# Patient Record
Sex: FEMALE | Race: WHITE | NOT HISPANIC OR LATINO | Employment: OTHER | URBAN - METROPOLITAN AREA
[De-identification: names, ages, dates, MRNs, and addresses within clinical notes are randomized per-mention and may not be internally consistent; named-entity substitution may affect disease eponyms.]

---

## 2018-05-14 ENCOUNTER — OFFICE VISIT (OUTPATIENT)
Dept: INTERNAL MEDICINE CLINIC | Facility: CLINIC | Age: 83
End: 2018-05-14
Payer: MEDICARE

## 2018-05-14 VITALS
RESPIRATION RATE: 16 BRPM | DIASTOLIC BLOOD PRESSURE: 70 MMHG | OXYGEN SATURATION: 98 % | HEIGHT: 60 IN | TEMPERATURE: 98.2 F | HEART RATE: 71 BPM | WEIGHT: 102.4 LBS | BODY MASS INDEX: 20.1 KG/M2 | SYSTOLIC BLOOD PRESSURE: 140 MMHG

## 2018-05-14 DIAGNOSIS — M81.0 AGE-RELATED OSTEOPOROSIS WITHOUT CURRENT PATHOLOGICAL FRACTURE: Primary | ICD-10-CM

## 2018-05-14 DIAGNOSIS — I10 ESSENTIAL HYPERTENSION: ICD-10-CM

## 2018-05-14 DIAGNOSIS — F41.1 ANXIETY STATE: ICD-10-CM

## 2018-05-14 PROBLEM — R41.3 MEMORY LOSS: Status: ACTIVE | Noted: 2017-09-27

## 2018-05-14 PROCEDURE — 99203 OFFICE O/P NEW LOW 30 MIN: CPT | Performed by: INTERNAL MEDICINE

## 2018-05-14 RX ORDER — ALPRAZOLAM 0.25 MG/1
0.5 TABLET ORAL EVERY 6 HOURS PRN
COMMUNITY
Start: 2018-03-30 | End: 2019-06-11 | Stop reason: HOSPADM

## 2018-05-14 RX ORDER — ALENDRONATE SODIUM 70 MG/1
70 TABLET ORAL WEEKLY
COMMUNITY
Start: 2018-03-19 | End: 2018-09-19 | Stop reason: ALTCHOICE

## 2018-05-14 RX ORDER — ZINC GLUCONATE 50 MG
1 TABLET ORAL
COMMUNITY
Start: 2011-11-28 | End: 2019-07-10 | Stop reason: HOSPADM

## 2018-05-14 RX ORDER — VITAMIN E 268 MG
1 CAPSULE ORAL
COMMUNITY
Start: 2011-11-28 | End: 2019-07-10 | Stop reason: HOSPADM

## 2018-05-14 RX ORDER — ESCITALOPRAM OXALATE 20 MG/1
30 TABLET ORAL
COMMUNITY
Start: 2018-03-19 | End: 2018-07-09 | Stop reason: SDUPTHER

## 2018-05-14 RX ORDER — BIOTIN 1 MG
1000 TABLET ORAL
COMMUNITY
Start: 2017-05-25 | End: 2019-07-10 | Stop reason: HOSPADM

## 2018-05-14 RX ORDER — ASPIRIN 81 MG/1
1 TABLET, CHEWABLE ORAL ONCE
COMMUNITY
Start: 2011-05-05

## 2018-05-14 RX ORDER — LISINOPRIL AND HYDROCHLOROTHIAZIDE 12.5; 1 MG/1; MG/1
1 TABLET ORAL
COMMUNITY
Start: 2018-03-19 | End: 2018-09-19 | Stop reason: ALTCHOICE

## 2018-05-14 RX ORDER — SPIRONOLACTONE 25 MG
40 TABLET ORAL
COMMUNITY
Start: 2013-11-21 | End: 2019-06-29 | Stop reason: ALTCHOICE

## 2018-05-14 RX ORDER — FEXOFENADINE HCL 180 MG/1
60 TABLET ORAL 2 TIMES DAILY
COMMUNITY
Start: 2013-04-10 | End: 2019-07-10 | Stop reason: HOSPADM

## 2018-05-14 NOTE — PROGRESS NOTES
Assessment/Plan:    No problem-specific Assessment & Plan notes found for this encounter  Diagnoses and all orders for this visit:    Age-related osteoporosis without current pathological fracture  -     Vitamin D 25 hydroxy; Future  -     DXA bone density spine hip and pelvis; Future    Essential hypertension    Anxiety state    Other orders  -     aspirin 81 mg chewable tablet; Chew 1 tablet  -     lisinopril-hydrochlorothiazide (PRINZIDE,ZESTORETIC) 10-12 5 MG per tablet; Take 1 tablet by mouth  -     ALPRAZolam (XANAX) 0 25 mg tablet; Take 0 25 mg by mouth 2 (two) times a day  -     escitalopram (LEXAPRO) 20 mg tablet; Take 30 mg by mouth   -     alendronate (FOSAMAX) 70 mg tablet; Take 70 mg by mouth Once a week  -     Biotin 1000 MCG tablet; Take 1,000 mcg by mouth  -     fexofenadine (ALLEGRA) 180 MG tablet; Take 1 tablet by mouth  -     triamcinolone (KENALOG) 0 1 % ointment; Apply topically  -     Ascorbic Acid, Vitamin C, (VITAMIN C) 100 MG tablet; Take 500 mg by mouth  -     calcium-vitamin D (OSCAL) 250-125 MG-UNIT per tablet; Take 1 tablet by mouth  -     Multiple Vitamins-Minerals (MULTIVITAL-M) TABS; Take 1 tablet by mouth  -     Lutein 20 MG CAPS; Take 40 mg by mouth  -     vitamin E, tocopherol, 400 units capsule; Take 1 capsule by mouth  -     Zinc 50 MG TABS; Take 1 tablet by mouth      Shawna Souza was seen and examined in the office today  Repeat blood pressure was 128/78 and exam otherwise was largely normal  Blood work from [facility] was reviewed (completed in January 2018)  She does report some steady weight loss and is trying to increase calorie content  She generally eats very healthy  Mammogram was completed last summer and she does report having Cscope in her [de-identified]  She does not have any worrisome symptoms at this time and was told to monitor the weight  She was given lab work to get a Vitamin D completed as well as a repeat DEXA as she has not had one   We discussed possibly coming off the Fosamax as well  In terms of her anxiety, she is going to increase her Lexapro to 30 mg daily  Otherwise no other changes were made  Subjective:      Patient ID: Salvador Eisenberg is a 80 y o  female  Arna Simmonds is a very pleasant woman that is here today with her daughter to establish care  Medical history was reviewed and updated  She has a known history of osteoporosis that was diagnosed some years ago  She reports being maintained on Fosamax for over 5 years  She denies any history of fractures  She does report having anxiety at times and will manifest as thoughts of things that she should do have completed during the day  She is able to sleep at night  See chart for details  The following portions of the patient's history were reviewed and updated as appropriate: allergies, current medications, past family history, past medical history, past social history, past surgical history and problem list     Review of Systems   Constitutional: Positive for unexpected weight change  Negative for chills and fever  HENT: Negative for sinus pain, sinus pressure and sore throat  Eyes: Negative for visual disturbance  Respiratory: Negative for cough, chest tightness, shortness of breath and wheezing  Cardiovascular: Negative for chest pain, palpitations and leg swelling  Gastrointestinal: Negative for abdominal pain, blood in stool, constipation, diarrhea, nausea and vomiting  Genitourinary: Negative for difficulty urinating and dysuria  Musculoskeletal: Negative for arthralgias, back pain and myalgias  Neurological: Negative for dizziness, syncope, numbness and headaches  Psychiatric/Behavioral: Negative for dysphoric mood and sleep disturbance  The patient is nervous/anxious            Objective:      /70 (BP Location: Left arm, Patient Position: Sitting, Cuff Size: Adult)   Pulse 71   Temp 98 2 °F (36 8 °C) (Tympanic)   Resp 16   Ht 5' (1 524 m)   Wt 46 4 kg (102 lb 6 4 oz) SpO2 98%   BMI 20 00 kg/m²          Physical Exam   Constitutional: She is oriented to person, place, and time  She appears well-developed and well-nourished  No distress  HENT:   Head: Normocephalic and atraumatic  Eyes: Conjunctivae and EOM are normal  Right eye exhibits no discharge  Left eye exhibits no discharge  No scleral icterus  Neck: Normal range of motion  No tracheal deviation present  No thyromegaly present  Cardiovascular: Normal rate, regular rhythm and normal heart sounds  No murmur heard  Pulmonary/Chest: Effort normal and breath sounds normal  No respiratory distress  She has no wheezes  She exhibits no tenderness  Abdominal: Soft  Bowel sounds are normal  There is no tenderness  There is no rebound  Musculoskeletal: Normal range of motion  She exhibits no edema  Lymphadenopathy:     She has no cervical adenopathy  Neurological: She is alert and oriented to person, place, and time  She has normal reflexes  No cranial nerve deficit  Skin: Skin is warm and dry  She is not diaphoretic  No erythema  Psychiatric: She has a normal mood and affect  Her speech is normal and behavior is normal    Vitals reviewed

## 2018-06-21 ENCOUNTER — HOSPITAL ENCOUNTER (OUTPATIENT)
Dept: BONE DENSITY | Facility: MEDICAL CENTER | Age: 83
Discharge: HOME | End: 2018-06-21
Payer: MEDICARE

## 2018-06-21 DIAGNOSIS — M81.0 AGE-RELATED OSTEOPOROSIS WITHOUT CURRENT PATHOLOGICAL FRACTURE: ICD-10-CM

## 2018-06-21 PROCEDURE — 77080 DXA BONE DENSITY AXIAL: CPT

## 2018-07-09 ENCOUNTER — TELEPHONE (OUTPATIENT)
Dept: INTERNAL MEDICINE CLINIC | Facility: CLINIC | Age: 83
End: 2018-07-09

## 2018-07-09 DIAGNOSIS — F41.1 ANXIETY STATE: Primary | ICD-10-CM

## 2018-07-09 RX ORDER — ESCITALOPRAM OXALATE 20 MG/1
20 TABLET ORAL DAILY
Qty: 90 TABLET | Refills: 0 | Status: SHIPPED | OUTPATIENT
Start: 2018-07-09 | End: 2018-09-26 | Stop reason: SDUPTHER

## 2018-09-19 ENCOUNTER — OFFICE VISIT (OUTPATIENT)
Dept: INTERNAL MEDICINE CLINIC | Facility: CLINIC | Age: 83
End: 2018-09-19
Payer: MEDICARE

## 2018-09-19 VITALS
HEIGHT: 60 IN | HEART RATE: 58 BPM | BODY MASS INDEX: 20.42 KG/M2 | SYSTOLIC BLOOD PRESSURE: 118 MMHG | DIASTOLIC BLOOD PRESSURE: 62 MMHG | WEIGHT: 104 LBS | OXYGEN SATURATION: 98 %

## 2018-09-19 DIAGNOSIS — Z23 NEEDS FLU SHOT: ICD-10-CM

## 2018-09-19 DIAGNOSIS — M81.0 AGE-RELATED OSTEOPOROSIS WITHOUT CURRENT PATHOLOGICAL FRACTURE: ICD-10-CM

## 2018-09-19 DIAGNOSIS — I10 ESSENTIAL HYPERTENSION: Primary | ICD-10-CM

## 2018-09-19 PROCEDURE — G0008 ADMIN INFLUENZA VIRUS VAC: HCPCS

## 2018-09-19 PROCEDURE — 90662 IIV NO PRSV INCREASED AG IM: CPT

## 2018-09-19 PROCEDURE — 99213 OFFICE O/P EST LOW 20 MIN: CPT | Performed by: INTERNAL MEDICINE

## 2018-09-19 RX ORDER — LISINOPRIL 10 MG/1
10 TABLET ORAL DAILY
Qty: 90 TABLET | Refills: 1 | Status: SHIPPED | OUTPATIENT
Start: 2018-09-19 | End: 2019-04-25 | Stop reason: SDUPTHER

## 2018-09-19 NOTE — PROGRESS NOTES
Assessment/Plan:     Diagnoses and all orders for this visit:    Essential hypertension  -     Basic metabolic panel  -     CBC and differential  -     lisinopril (ZESTRIL) 10 mg tablet; Take 1 tablet (10 mg total) by mouth daily    Age-related osteoporosis without current pathological fracture    Needs flu shot  -     influenza vaccine, [id number], high-dose, PF 0 5 mL, for patients 65 yr+ (Jefferson Carbajal)      Camron Gomez was seen and examined in the office today  Overall, she appears well  Repeat blood pressure was 118/70  We did discuss we can switch to just Lisinopril and see if that helps with the dizziness  In terms of her known osteoporosis, she was on Fosamax for year and I think this is okay to hold now  She did receive the influenza vaccination today  Subjective:      Patient ID: Gennaro Jeffers is a 80 y o  female  Camron Gomez is here today with her daughter for a routine follow up  She is doing quite well  She does have some short term memory issues but overall is doing well  She has a decent appetite and denies any significant complaints  She notes feeling lightheaded at times in the morning and this can get her anxious  She continues to take the Lexapro and will rarely use the Xanax  It does help when she uses it though  She also reports that she stopped taking the Fosamax as it was getting bothersome  The following portions of the patient's history were reviewed and updated as appropriate: allergies, current medications, past family history, past medical history, past social history, past surgical history and problem list     Review of Systems   Constitutional: Negative for chills, fever and unexpected weight change  HENT: Negative for sinus pain, sinus pressure and sore throat  Eyes: Negative for visual disturbance  Respiratory: Negative for cough, chest tightness, shortness of breath and wheezing  Cardiovascular: Negative for chest pain, palpitations and leg swelling  Gastrointestinal: Negative for abdominal pain, blood in stool, constipation, diarrhea, nausea and vomiting  Genitourinary: Negative for difficulty urinating and dysuria  Musculoskeletal: Negative for arthralgias, back pain and myalgias  Neurological: Positive for light-headedness  Negative for dizziness, syncope, numbness and headaches  Psychiatric/Behavioral: Negative for dysphoric mood and sleep disturbance  The patient is not nervous/anxious  Short term memory problems         Objective:      /62 (BP Location: Left arm, Patient Position: Sitting, Cuff Size: Standard)   Pulse 58   Ht 5' (1 524 m)   Wt 47 2 kg (104 lb)   SpO2 98%   BMI 20 31 kg/m²          Physical Exam   Constitutional: She is oriented to person, place, and time  She appears well-developed and well-nourished  No distress  HENT:   Head: Normocephalic and atraumatic  Mouth/Throat: Oropharynx is clear and moist    Eyes: Conjunctivae and EOM are normal  Right eye exhibits no discharge  Left eye exhibits no discharge  No scleral icterus  Neck: Normal range of motion  Cardiovascular: Normal rate and regular rhythm  Murmur heard  Pulmonary/Chest: Effort normal and breath sounds normal  No respiratory distress  She has no wheezes  She exhibits no tenderness  Abdominal: Soft  Bowel sounds are normal  There is no tenderness  There is no rebound  Musculoskeletal: Normal range of motion  She exhibits no edema  Lymphadenopathy:     She has no cervical adenopathy  Neurological: She is alert and oriented to person, place, and time  No cranial nerve deficit  Skin: Skin is warm and dry  She is not diaphoretic  No erythema  Psychiatric: She has a normal mood and affect  Her speech is normal and behavior is normal  Thought content normal    Vitals reviewed

## 2018-09-26 ENCOUNTER — TELEPHONE (OUTPATIENT)
Dept: INTERNAL MEDICINE CLINIC | Facility: CLINIC | Age: 83
End: 2018-09-26

## 2018-09-26 DIAGNOSIS — F41.1 ANXIETY STATE: ICD-10-CM

## 2018-09-26 RX ORDER — ESCITALOPRAM OXALATE 20 MG/1
20 TABLET ORAL DAILY
Qty: 90 TABLET | Refills: 3 | Status: SHIPPED | OUTPATIENT
Start: 2018-09-26

## 2018-09-26 NOTE — TELEPHONE ENCOUNTER
PT'S DAUGHTER asked for renew of escitalopram (LEXAPRO) 20 mg with 3 refills  [facility] to [facility]   TY

## 2018-10-05 LAB
BASOPHILS # BLD AUTO: 40 CELLS/UL (ref 0–200)
BASOPHILS NFR BLD AUTO: 0.9 %
BUN SERPL-MCNC: 22 MG/DL (ref 7–25)
BUN/CREAT SERPL: NORMAL (CALC) (ref 6–22)
CALCIUM SERPL-MCNC: 9.6 MG/DL (ref 8.6–10.4)
CHLORIDE SERPL-SCNC: 107 MMOL/L (ref 98–110)
CO2 SERPL-SCNC: 28 MMOL/L (ref 20–32)
CREAT SERPL-MCNC: 0.79 MG/DL (ref 0.6–0.88)
EOSINOPHIL # BLD AUTO: 40 CELLS/UL (ref 15–500)
EOSINOPHIL NFR BLD AUTO: 0.9 %
ERYTHROCYTE [DISTWIDTH] IN BLOOD BY AUTOMATED COUNT: 12.1 % (ref 11–15)
GLUCOSE SERPL-MCNC: 101 MG/DL (ref 65–139)
HCT VFR BLD AUTO: 42.9 % (ref 35–45)
HGB BLD-MCNC: 14.2 G/DL (ref 11.7–15.5)
LYMPHOCYTES # BLD AUTO: 1302 CELLS/UL (ref 850–3900)
LYMPHOCYTES NFR BLD AUTO: 29.6 %
MCH RBC QN AUTO: 31.2 PG (ref 27–33)
MCHC RBC AUTO-ENTMCNC: 33.1 G/DL (ref 32–36)
MCV RBC AUTO: 94.3 FL (ref 80–100)
MONOCYTES # BLD AUTO: 330 CELLS/UL (ref 200–950)
MONOCYTES NFR BLD AUTO: 7.5 %
NEUTROPHILS # BLD AUTO: 2688 CELLS/UL (ref 1500–7800)
NEUTROPHILS NFR BLD AUTO: 61.1 %
PLATELET # BLD AUTO: 170 THOUSAND/UL (ref 140–400)
PMV BLD REES-ECKER: 10.7 FL (ref 7.5–12.5)
POTASSIUM SERPL-SCNC: 4.4 MMOL/L (ref 3.5–5.3)
RBC # BLD AUTO: 4.55 MILLION/UL (ref 3.8–5.1)
SL AMB EGFR AFRICAN AMERICAN: 74 ML/MIN/1.73M2
SL AMB EGFR NON AFRICAN AMERICAN: 64 ML/MIN/1.73M2
SODIUM SERPL-SCNC: 141 MMOL/L (ref 135–146)
WBC # BLD AUTO: 4.4 THOUSAND/UL (ref 3.8–10.8)

## 2018-10-08 ENCOUNTER — TELEPHONE (OUTPATIENT)
Dept: INTERNAL MEDICINE CLINIC | Facility: CLINIC | Age: 83
End: 2018-10-08

## 2018-10-08 NOTE — TELEPHONE ENCOUNTER
----- Message from Delmi Resendiz DO sent at 10/8/2018  8:59 AM EDT -----  Could you let Marjorie Nael know that her blood work is stable (or daughter)

## 2019-03-20 ENCOUNTER — OFFICE VISIT (OUTPATIENT)
Dept: FAMILY MEDICINE CLINIC | Facility: CLINIC | Age: 84
End: 2019-03-20
Payer: MEDICARE

## 2019-03-20 VITALS
OXYGEN SATURATION: 98 % | DIASTOLIC BLOOD PRESSURE: 64 MMHG | HEIGHT: 60 IN | TEMPERATURE: 98.6 F | BODY MASS INDEX: 19.87 KG/M2 | WEIGHT: 101.2 LBS | HEART RATE: 62 BPM | SYSTOLIC BLOOD PRESSURE: 116 MMHG | RESPIRATION RATE: 18 BRPM

## 2019-03-20 DIAGNOSIS — Z00.00 MEDICARE ANNUAL WELLNESS VISIT, SUBSEQUENT: ICD-10-CM

## 2019-03-20 DIAGNOSIS — E78.2 MIXED HYPERLIPIDEMIA: ICD-10-CM

## 2019-03-20 DIAGNOSIS — I10 ESSENTIAL HYPERTENSION: Primary | ICD-10-CM

## 2019-03-20 DIAGNOSIS — F41.1 ANXIETY STATE: ICD-10-CM

## 2019-03-20 DIAGNOSIS — R26.2 AMBULATORY DYSFUNCTION: ICD-10-CM

## 2019-03-20 PROCEDURE — G0439 PPPS, SUBSEQ VISIT: HCPCS | Performed by: INTERNAL MEDICINE

## 2019-03-20 PROCEDURE — 99213 OFFICE O/P EST LOW 20 MIN: CPT | Performed by: INTERNAL MEDICINE

## 2019-03-20 NOTE — PROGRESS NOTES
Assessment/Plan:     Diagnoses and all orders for this visit:    Essential hypertension    Mixed hyperlipidemia    Anxiety state    Ambulatory dysfunction  -     Ambulatory referral to Physical Therapy; Future    Medicare annual wellness visit, subsequent      Krishan Shepard was seen and examined in the office today  Overall, she appears well for 89  Blood pressure is well controlled  Mood is controlled  At times she has confusion overnight but does not appear to be interfering with life  AWV was reviewed today as well  Subjective:      Patient ID: Angie Shafer is a 80 y o  female  Krishan Shepard is here today for a routine follow up/AWV  Since her last visit, she reports feeling relatively well  She is here with her daughter  Krishan Shepard currently lives alone but has great support with 3 of her kids living within a mile of her  Overall, she feels okay  She notes some right sided leg discomfort that she thinks is present secondary to poor seating at a show  She also reports one episode of bowel incontinence but has not had it since  The following portions of the patient's history were reviewed and updated as appropriate: allergies, past family history, past medical history, past social history, past surgical history and problem list     Review of Systems   Constitutional: Negative for chills, fever and unexpected weight change  HENT: Negative for sore throat and trouble swallowing  Eyes: Negative for visual disturbance  Respiratory: Negative for cough, chest tightness and shortness of breath  Cardiovascular: Negative for chest pain, palpitations and leg swelling  Gastrointestinal: Negative for abdominal pain, diarrhea, nausea and vomiting  Genitourinary: Negative for difficulty urinating  Musculoskeletal: Positive for gait problem  Negative for arthralgias and myalgias  Neurological: Negative for dizziness, numbness and headaches     Psychiatric/Behavioral: Negative for dysphoric mood and sleep disturbance  The patient is not nervous/anxious  Mild confusion at times         Objective:      /64   Pulse 62   Temp 98 6 °F (37 °C)   Resp 18   Ht 5' (1 524 m)   Wt 45 9 kg (101 lb 3 2 oz)   SpO2 98%   BMI 19 76 kg/m²          Physical Exam   Constitutional: She appears well-developed and well-nourished  No distress  HENT:   Head: Normocephalic and atraumatic  Right Ear: External ear normal    Left Ear: External ear normal    Mouth/Throat: Oropharynx is clear and moist    Eyes: Conjunctivae and EOM are normal  Right eye exhibits no discharge  Left eye exhibits no discharge  No scleral icterus  Neck: Normal range of motion  Cardiovascular: Normal rate, regular rhythm and normal heart sounds  No murmur heard  Pulmonary/Chest: Effort normal and breath sounds normal  No respiratory distress  She has no wheezes  Abdominal: Soft  There is no tenderness  There is no guarding  Musculoskeletal: Normal range of motion  Uses cane to ambulate   Lymphadenopathy:     She has no cervical adenopathy  Neurological: She is alert  Skin: Skin is warm and dry  She is not diaphoretic  No erythema  Psychiatric: She has a normal mood and affect  Her speech is normal and behavior is normal  Judgment and thought content normal    Vitals reviewed

## 2019-04-25 DIAGNOSIS — I10 ESSENTIAL HYPERTENSION: ICD-10-CM

## 2019-04-26 RX ORDER — LISINOPRIL 10 MG/1
TABLET ORAL
Qty: 90 TABLET | Refills: 0 | Status: SHIPPED | OUTPATIENT
Start: 2019-04-26 | End: 2019-07-10 | Stop reason: HOSPADM

## 2019-04-30 ENCOUNTER — TELEPHONE (OUTPATIENT)
Dept: OTHER | Facility: OTHER | Age: 84
End: 2019-04-30

## 2019-05-15 ENCOUNTER — EVALUATION (OUTPATIENT)
Dept: PHYSICAL THERAPY | Facility: REHABILITATION | Age: 84
End: 2019-05-15
Payer: MEDICARE

## 2019-05-15 DIAGNOSIS — R26.2 AMBULATORY DYSFUNCTION: Primary | ICD-10-CM

## 2019-05-15 PROCEDURE — 97163 PT EVAL HIGH COMPLEX 45 MIN: CPT

## 2019-05-20 ENCOUNTER — OFFICE VISIT (OUTPATIENT)
Dept: PHYSICAL THERAPY | Facility: REHABILITATION | Age: 84
End: 2019-05-20
Payer: MEDICARE

## 2019-05-20 DIAGNOSIS — R26.2 AMBULATORY DYSFUNCTION: Primary | ICD-10-CM

## 2019-05-20 PROCEDURE — 97112 NEUROMUSCULAR REEDUCATION: CPT

## 2019-05-22 ENCOUNTER — OFFICE VISIT (OUTPATIENT)
Dept: PHYSICAL THERAPY | Facility: REHABILITATION | Age: 84
End: 2019-05-22
Payer: MEDICARE

## 2019-05-22 ENCOUNTER — TELEPHONE (OUTPATIENT)
Dept: FAMILY MEDICINE CLINIC | Facility: CLINIC | Age: 84
End: 2019-05-22

## 2019-05-22 DIAGNOSIS — R26.2 AMBULATORY DYSFUNCTION: Primary | ICD-10-CM

## 2019-05-22 PROCEDURE — 97112 NEUROMUSCULAR REEDUCATION: CPT

## 2019-05-22 PROCEDURE — 97140 MANUAL THERAPY 1/> REGIONS: CPT

## 2019-05-23 ENCOUNTER — APPOINTMENT (OUTPATIENT)
Dept: RADIOLOGY | Facility: MEDICAL CENTER | Age: 84
End: 2019-05-23
Payer: MEDICARE

## 2019-05-23 DIAGNOSIS — R26.2 AMBULATORY DYSFUNCTION: Primary | ICD-10-CM

## 2019-05-23 DIAGNOSIS — R26.2 AMBULATORY DYSFUNCTION: ICD-10-CM

## 2019-05-23 PROCEDURE — 73502 X-RAY EXAM HIP UNI 2-3 VIEWS: CPT

## 2019-05-28 DIAGNOSIS — M79.606 PAIN OF LOWER EXTREMITY, UNSPECIFIED LATERALITY: Primary | ICD-10-CM

## 2019-05-29 ENCOUNTER — APPOINTMENT (OUTPATIENT)
Dept: PHYSICAL THERAPY | Facility: REHABILITATION | Age: 84
End: 2019-05-29
Payer: MEDICARE

## 2019-05-31 ENCOUNTER — APPOINTMENT (OUTPATIENT)
Dept: PHYSICAL THERAPY | Facility: REHABILITATION | Age: 84
End: 2019-05-31
Payer: MEDICARE

## 2019-06-06 ENCOUNTER — APPOINTMENT (EMERGENCY)
Dept: CT IMAGING | Facility: HOSPITAL | Age: 84
DRG: 093 | End: 2019-06-06
Payer: MEDICARE

## 2019-06-06 ENCOUNTER — HOSPITAL ENCOUNTER (EMERGENCY)
Facility: HOSPITAL | Age: 84
Discharge: HOME | DRG: 093 | End: 2019-06-06
Attending: EMERGENCY MEDICINE | Admitting: EMERGENCY MEDICINE
Payer: MEDICARE

## 2019-06-06 VITALS
SYSTOLIC BLOOD PRESSURE: 188 MMHG | OXYGEN SATURATION: 94 % | TEMPERATURE: 98.5 F | HEART RATE: 62 BPM | RESPIRATION RATE: 15 BRPM | DIASTOLIC BLOOD PRESSURE: 74 MMHG
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
## 2019-06-06 DIAGNOSIS — S09.90XA CLOSED HEAD INJURY, INITIAL ENCOUNTER: ICD-10-CM

## 2019-06-06 DIAGNOSIS — S01.01XA LACERATION OF OCCIPITAL SCALP, INITIAL ENCOUNTER: ICD-10-CM

## 2019-06-06 PROCEDURE — 0HQ0XZZ REPAIR SCALP SKIN, EXTERNAL APPROACH: ICD-10-PCS | Performed by: EMERGENCY MEDICINE

## 2019-06-06 PROCEDURE — 72125 CT NECK SPINE W/O DYE: CPT

## 2019-06-06 PROCEDURE — 99284 EMERGENCY DEPT VISIT MOD MDM: CPT

## 2019-06-06 PROCEDURE — 12001 RPR S/N/AX/GEN/TRNK 2.5CM/<: CPT | Performed by: EMERGENCY MEDICINE

## 2019-06-06 PROCEDURE — 70450 CT HEAD/BRAIN W/O DYE: CPT

## 2019-06-06 PROCEDURE — 99284 EMERGENCY DEPT VISIT MOD MDM: CPT | Performed by: EMERGENCY MEDICINE

## 2019-06-06 RX ORDER — LIDOCAINE HYDROCHLORIDE AND EPINEPHRINE 10; 10 MG/ML; UG/ML
20 INJECTION, SOLUTION INFILTRATION; PERINEURAL ONCE
Status: COMPLETED | OUTPATIENT
Start: 2019-06-06 | End: 2019-06-06

## 2019-06-06 RX ADMIN — LIDOCAINE HYDROCHLORIDE,EPINEPHRINE BITARTRATE 20 ML: 10; .01 INJECTION, SOLUTION INFILTRATION; PERINEURAL at 10:24

## 2019-06-07 ENCOUNTER — HOSPITAL ENCOUNTER (INPATIENT)
Facility: HOSPITAL | Age: 84
LOS: 4 days | Discharge: NURSING, REHAB OR LONG-TERM CARE | DRG: 093 | End: 2019-06-11
Attending: EMERGENCY MEDICINE | Admitting: INTERNAL MEDICINE
Payer: MEDICARE

## 2019-06-07 ENCOUNTER — APPOINTMENT (EMERGENCY)
Dept: CT IMAGING | Facility: HOSPITAL | Age: 84
DRG: 093 | End: 2019-06-07
Payer: MEDICARE

## 2019-06-07 DIAGNOSIS — R26.89 BALANCE PROBLEMS: ICD-10-CM

## 2019-06-07 DIAGNOSIS — R53.1 WEAKNESS: Primary | ICD-10-CM

## 2019-06-07 DIAGNOSIS — R26.2 AMBULATORY DYSFUNCTION: ICD-10-CM

## 2019-06-07 DIAGNOSIS — Z91.81 HISTORY OF RECENT FALL: ICD-10-CM

## 2019-06-07 PROBLEM — R01.1 MURMUR, CARDIAC: Status: ACTIVE | Noted: 2019-06-07

## 2019-06-07 LAB
ALBUMIN SERPL BCP-MCNC: 3 G/DL (ref 3.5–5)
ALP SERPL-CCNC: 56 U/L (ref 46–116)
ALT SERPL W P-5'-P-CCNC: 31 U/L (ref 12–78)
ANION GAP SERPL CALCULATED.3IONS-SCNC: 6 MMOL/L (ref 4–13)
AST SERPL W P-5'-P-CCNC: 35 U/L (ref 5–45)
BACTERIA UR QL AUTO: ABNORMAL /HPF
BASOPHILS # BLD AUTO: 0.03 THOUSANDS/ΜL (ref 0–0.1)
BASOPHILS NFR BLD AUTO: 1 % (ref 0–1)
BILIRUB SERPL-MCNC: 0.26 MG/DL (ref 0.2–1)
BILIRUB UR QL STRIP: NEGATIVE
BUN SERPL-MCNC: 25 MG/DL (ref 5–25)
CALCIUM SERPL-MCNC: 8.9 MG/DL (ref 8.3–10.1)
CHLORIDE SERPL-SCNC: 109 MMOL/L (ref 100–108)
CLARITY UR: CLEAR
CO2 SERPL-SCNC: 29 MMOL/L (ref 21–32)
COLOR UR: YELLOW
CREAT SERPL-MCNC: 0.69 MG/DL (ref 0.6–1.3)
EOSINOPHIL # BLD AUTO: 0.08 THOUSAND/ΜL (ref 0–0.61)
EOSINOPHIL NFR BLD AUTO: 2 % (ref 0–6)
ERYTHROCYTE [DISTWIDTH] IN BLOOD BY AUTOMATED COUNT: 14 % (ref 11.6–15.1)
GFR SERPL CREATININE-BSD FRML MDRD: 74 ML/MIN/1.73SQ M
GLUCOSE SERPL-MCNC: 82 MG/DL (ref 65–140)
GLUCOSE UR STRIP-MCNC: NEGATIVE MG/DL
HCT VFR BLD AUTO: 35.4 % (ref 34.8–46.1)
HGB BLD-MCNC: 10.9 G/DL (ref 11.5–15.4)
HGB UR QL STRIP.AUTO: ABNORMAL
IMM GRANULOCYTES # BLD AUTO: 0.02 THOUSAND/UL (ref 0–0.2)
IMM GRANULOCYTES NFR BLD AUTO: 0 % (ref 0–2)
KETONES UR STRIP-MCNC: NEGATIVE MG/DL
LEUKOCYTE ESTERASE UR QL STRIP: ABNORMAL
LYMPHOCYTES # BLD AUTO: 1.36 THOUSANDS/ΜL (ref 0.6–4.47)
LYMPHOCYTES NFR BLD AUTO: 29 % (ref 14–44)
MCH RBC QN AUTO: 31 PG (ref 26.8–34.3)
MCHC RBC AUTO-ENTMCNC: 30.8 G/DL (ref 31.4–37.4)
MCV RBC AUTO: 101 FL (ref 82–98)
MONOCYTES # BLD AUTO: 0.4 THOUSAND/ΜL (ref 0.17–1.22)
MONOCYTES NFR BLD AUTO: 9 % (ref 4–12)
NEUTROPHILS # BLD AUTO: 2.83 THOUSANDS/ΜL (ref 1.85–7.62)
NEUTS SEG NFR BLD AUTO: 59 % (ref 43–75)
NITRITE UR QL STRIP: NEGATIVE
NON-SQ EPI CELLS URNS QL MICRO: ABNORMAL /HPF
NRBC BLD AUTO-RTO: 0 /100 WBCS
PH UR STRIP.AUTO: 7 [PH] (ref 4.5–8)
PLATELET # BLD AUTO: 124 THOUSANDS/UL (ref 149–390)
PMV BLD AUTO: 10.4 FL (ref 8.9–12.7)
POTASSIUM SERPL-SCNC: 4.4 MMOL/L (ref 3.5–5.3)
PROT SERPL-MCNC: 5.9 G/DL (ref 6.4–8.2)
PROT UR STRIP-MCNC: NEGATIVE MG/DL
RBC # BLD AUTO: 3.52 MILLION/UL (ref 3.81–5.12)
RBC #/AREA URNS AUTO: ABNORMAL /HPF
SODIUM SERPL-SCNC: 144 MMOL/L (ref 136–145)
SP GR UR STRIP.AUTO: 1.01 (ref 1–1.03)
TROPONIN I SERPL-MCNC: 0.03 NG/ML
TSH SERPL DL<=0.05 MIU/L-ACNC: 1.16 UIU/ML (ref 0.36–3.74)
UROBILINOGEN UR QL STRIP.AUTO: 0.2 E.U./DL
WBC # BLD AUTO: 4.72 THOUSAND/UL (ref 4.31–10.16)
WBC #/AREA URNS AUTO: ABNORMAL /HPF

## 2019-06-07 PROCEDURE — 93005 ELECTROCARDIOGRAM TRACING: CPT

## 2019-06-07 PROCEDURE — 85025 COMPLETE CBC W/AUTO DIFF WBC: CPT | Performed by: EMERGENCY MEDICINE

## 2019-06-07 PROCEDURE — 70450 CT HEAD/BRAIN W/O DYE: CPT

## 2019-06-07 PROCEDURE — 99285 EMERGENCY DEPT VISIT HI MDM: CPT

## 2019-06-07 PROCEDURE — 80053 COMPREHEN METABOLIC PANEL: CPT | Performed by: EMERGENCY MEDICINE

## 2019-06-07 PROCEDURE — 99222 1ST HOSP IP/OBS MODERATE 55: CPT | Performed by: NURSE PRACTITIONER

## 2019-06-07 PROCEDURE — 36415 COLL VENOUS BLD VENIPUNCTURE: CPT | Performed by: EMERGENCY MEDICINE

## 2019-06-07 PROCEDURE — 99285 EMERGENCY DEPT VISIT HI MDM: CPT | Performed by: EMERGENCY MEDICINE

## 2019-06-07 PROCEDURE — 84443 ASSAY THYROID STIM HORMONE: CPT | Performed by: EMERGENCY MEDICINE

## 2019-06-07 PROCEDURE — 84484 ASSAY OF TROPONIN QUANT: CPT | Performed by: EMERGENCY MEDICINE

## 2019-06-07 PROCEDURE — 81001 URINALYSIS AUTO W/SCOPE: CPT

## 2019-06-07 RX ORDER — ALPRAZOLAM 0.5 MG/1
0.5 TABLET ORAL
Status: DISCONTINUED | OUTPATIENT
Start: 2019-06-07 | End: 2019-06-09 | Stop reason: DRUGHIGH

## 2019-06-07 RX ORDER — ACETAMINOPHEN 325 MG/1
650 TABLET ORAL EVERY 6 HOURS PRN
Status: DISCONTINUED | OUTPATIENT
Start: 2019-06-07 | End: 2019-06-11 | Stop reason: HOSPADM

## 2019-06-07 RX ORDER — ESCITALOPRAM OXALATE 10 MG/1
20 TABLET ORAL DAILY
Status: DISCONTINUED | OUTPATIENT
Start: 2019-06-08 | End: 2019-06-11 | Stop reason: HOSPADM

## 2019-06-07 RX ORDER — ASCORBIC ACID 500 MG
500 TABLET ORAL DAILY
Status: DISCONTINUED | OUTPATIENT
Start: 2019-06-08 | End: 2019-06-11 | Stop reason: HOSPADM

## 2019-06-07 RX ORDER — BIOTIN 1 MG
1000 TABLET ORAL DAILY
Status: DISCONTINUED | OUTPATIENT
Start: 2019-06-08 | End: 2019-06-07

## 2019-06-07 RX ORDER — LISINOPRIL 10 MG/1
10 TABLET ORAL DAILY
Status: DISCONTINUED | OUTPATIENT
Start: 2019-06-08 | End: 2019-06-11 | Stop reason: HOSPADM

## 2019-06-07 RX ORDER — SODIUM CHLORIDE 9 MG/ML
50 INJECTION, SOLUTION INTRAVENOUS CONTINUOUS
Status: DISCONTINUED | OUTPATIENT
Start: 2019-06-07 | End: 2019-06-10

## 2019-06-07 RX ORDER — ASPIRIN 81 MG/1
81 TABLET, CHEWABLE ORAL ONCE
Status: COMPLETED | OUTPATIENT
Start: 2019-06-07 | End: 2019-06-07

## 2019-06-07 RX ORDER — ONDANSETRON 2 MG/ML
4 INJECTION INTRAMUSCULAR; INTRAVENOUS EVERY 6 HOURS PRN
Status: DISCONTINUED | OUTPATIENT
Start: 2019-06-07 | End: 2019-06-11 | Stop reason: HOSPADM

## 2019-06-07 RX ADMIN — ASPIRIN 81 MG 81 MG: 81 TABLET ORAL at 22:17

## 2019-06-07 RX ADMIN — ALPRAZOLAM 0.5 MG: 0.5 TABLET ORAL at 22:17

## 2019-06-07 RX ADMIN — SODIUM CHLORIDE 125 ML/HR: 0.9 INJECTION, SOLUTION INTRAVENOUS at 22:08

## 2019-06-08 LAB
ANION GAP SERPL CALCULATED.3IONS-SCNC: 7 MMOL/L (ref 4–13)
BUN SERPL-MCNC: 16 MG/DL (ref 5–25)
CALCIUM SERPL-MCNC: 8.9 MG/DL (ref 8.3–10.1)
CHLORIDE SERPL-SCNC: 111 MMOL/L (ref 100–108)
CO2 SERPL-SCNC: 28 MMOL/L (ref 21–32)
CREAT SERPL-MCNC: 0.61 MG/DL (ref 0.6–1.3)
ERYTHROCYTE [DISTWIDTH] IN BLOOD BY AUTOMATED COUNT: 13.8 % (ref 11.6–15.1)
GFR SERPL CREATININE-BSD FRML MDRD: 77 ML/MIN/1.73SQ M
GLUCOSE SERPL-MCNC: 93 MG/DL (ref 65–140)
HCT VFR BLD AUTO: 37.3 % (ref 34.8–46.1)
HGB BLD-MCNC: 11.6 G/DL (ref 11.5–15.4)
MAGNESIUM SERPL-MCNC: 1.8 MG/DL (ref 1.6–2.6)
MCH RBC QN AUTO: 31.3 PG (ref 26.8–34.3)
MCHC RBC AUTO-ENTMCNC: 31.1 G/DL (ref 31.4–37.4)
MCV RBC AUTO: 101 FL (ref 82–98)
PLATELET # BLD AUTO: 143 THOUSANDS/UL (ref 149–390)
PMV BLD AUTO: 10.3 FL (ref 8.9–12.7)
POTASSIUM SERPL-SCNC: 3.8 MMOL/L (ref 3.5–5.3)
RBC # BLD AUTO: 3.71 MILLION/UL (ref 3.81–5.12)
SODIUM SERPL-SCNC: 146 MMOL/L (ref 136–145)
WBC # BLD AUTO: 5.09 THOUSAND/UL (ref 4.31–10.16)

## 2019-06-08 PROCEDURE — 99233 SBSQ HOSP IP/OBS HIGH 50: CPT | Performed by: INTERNAL MEDICINE

## 2019-06-08 PROCEDURE — 85027 COMPLETE CBC AUTOMATED: CPT | Performed by: NURSE PRACTITIONER

## 2019-06-08 PROCEDURE — 83735 ASSAY OF MAGNESIUM: CPT | Performed by: NURSE PRACTITIONER

## 2019-06-08 PROCEDURE — 80048 BASIC METABOLIC PNL TOTAL CA: CPT | Performed by: NURSE PRACTITIONER

## 2019-06-08 RX ADMIN — SODIUM CHLORIDE 125 ML/HR: 0.9 INJECTION, SOLUTION INTRAVENOUS at 15:00

## 2019-06-08 RX ADMIN — OXYCODONE HYDROCHLORIDE AND ACETAMINOPHEN 500 MG: 500 TABLET ORAL at 08:40

## 2019-06-08 RX ADMIN — ENOXAPARIN SODIUM 30 MG: 30 INJECTION SUBCUTANEOUS at 08:40

## 2019-06-08 RX ADMIN — SODIUM CHLORIDE 125 ML/HR: 0.9 INJECTION, SOLUTION INTRAVENOUS at 06:37

## 2019-06-08 RX ADMIN — ALPRAZOLAM 0.5 MG: 0.5 TABLET ORAL at 21:05

## 2019-06-08 RX ADMIN — ESCITALOPRAM OXALATE 20 MG: 10 TABLET ORAL at 08:40

## 2019-06-08 RX ADMIN — SODIUM CHLORIDE 125 ML/HR: 0.9 INJECTION, SOLUTION INTRAVENOUS at 23:13

## 2019-06-08 RX ADMIN — LISINOPRIL 10 MG: 10 TABLET ORAL at 08:40

## 2019-06-09 LAB
ANION GAP SERPL CALCULATED.3IONS-SCNC: 8 MMOL/L (ref 4–13)
BASOPHILS # BLD AUTO: 0.03 THOUSANDS/ΜL (ref 0–0.1)
BASOPHILS NFR BLD AUTO: 1 % (ref 0–1)
BUN SERPL-MCNC: 11 MG/DL (ref 5–25)
CALCIUM SERPL-MCNC: 9 MG/DL (ref 8.3–10.1)
CHLORIDE SERPL-SCNC: 111 MMOL/L (ref 100–108)
CO2 SERPL-SCNC: 28 MMOL/L (ref 21–32)
CREAT SERPL-MCNC: 0.59 MG/DL (ref 0.6–1.3)
EOSINOPHIL # BLD AUTO: 0.03 THOUSAND/ΜL (ref 0–0.61)
EOSINOPHIL NFR BLD AUTO: 1 % (ref 0–6)
ERYTHROCYTE [DISTWIDTH] IN BLOOD BY AUTOMATED COUNT: 13.7 % (ref 11.6–15.1)
GFR SERPL CREATININE-BSD FRML MDRD: 78 ML/MIN/1.73SQ M
GLUCOSE SERPL-MCNC: 97 MG/DL (ref 65–140)
HCT VFR BLD AUTO: 40 % (ref 34.8–46.1)
HGB BLD-MCNC: 12.8 G/DL (ref 11.5–15.4)
IMM GRANULOCYTES # BLD AUTO: 0.04 THOUSAND/UL (ref 0–0.2)
IMM GRANULOCYTES NFR BLD AUTO: 1 % (ref 0–2)
LYMPHOCYTES # BLD AUTO: 0.85 THOUSANDS/ΜL (ref 0.6–4.47)
LYMPHOCYTES NFR BLD AUTO: 16 % (ref 14–44)
MAGNESIUM SERPL-MCNC: 1.7 MG/DL (ref 1.6–2.6)
MCH RBC QN AUTO: 31.9 PG (ref 26.8–34.3)
MCHC RBC AUTO-ENTMCNC: 32 G/DL (ref 31.4–37.4)
MCV RBC AUTO: 100 FL (ref 82–98)
MONOCYTES # BLD AUTO: 0.43 THOUSAND/ΜL (ref 0.17–1.22)
MONOCYTES NFR BLD AUTO: 8 % (ref 4–12)
NEUTROPHILS # BLD AUTO: 4.07 THOUSANDS/ΜL (ref 1.85–7.62)
NEUTS SEG NFR BLD AUTO: 73 % (ref 43–75)
NRBC BLD AUTO-RTO: 0 /100 WBCS
PLATELET # BLD AUTO: 147 THOUSANDS/UL (ref 149–390)
PMV BLD AUTO: 10 FL (ref 8.9–12.7)
POTASSIUM SERPL-SCNC: 3.5 MMOL/L (ref 3.5–5.3)
RBC # BLD AUTO: 4.01 MILLION/UL (ref 3.81–5.12)
SODIUM SERPL-SCNC: 147 MMOL/L (ref 136–145)
WBC # BLD AUTO: 5.45 THOUSAND/UL (ref 4.31–10.16)

## 2019-06-09 PROCEDURE — 85025 COMPLETE CBC W/AUTO DIFF WBC: CPT | Performed by: INTERNAL MEDICINE

## 2019-06-09 PROCEDURE — 80048 BASIC METABOLIC PNL TOTAL CA: CPT | Performed by: INTERNAL MEDICINE

## 2019-06-09 PROCEDURE — 83735 ASSAY OF MAGNESIUM: CPT | Performed by: INTERNAL MEDICINE

## 2019-06-09 PROCEDURE — 99232 SBSQ HOSP IP/OBS MODERATE 35: CPT | Performed by: INTERNAL MEDICINE

## 2019-06-09 RX ORDER — LORAZEPAM 2 MG/ML
0.5 INJECTION INTRAMUSCULAR ONCE
Status: COMPLETED | OUTPATIENT
Start: 2019-06-09 | End: 2019-06-09

## 2019-06-09 RX ORDER — ALPRAZOLAM 0.5 MG/1
0.5 TABLET ORAL 3 TIMES DAILY PRN
Status: DISCONTINUED | OUTPATIENT
Start: 2019-06-09 | End: 2019-06-11 | Stop reason: HOSPADM

## 2019-06-09 RX ADMIN — SODIUM CHLORIDE 50 ML/HR: 0.9 INJECTION, SOLUTION INTRAVENOUS at 13:01

## 2019-06-09 RX ADMIN — OXYCODONE HYDROCHLORIDE AND ACETAMINOPHEN 500 MG: 500 TABLET ORAL at 08:35

## 2019-06-09 RX ADMIN — ENOXAPARIN SODIUM 30 MG: 30 INJECTION SUBCUTANEOUS at 08:36

## 2019-06-09 RX ADMIN — ESCITALOPRAM OXALATE 20 MG: 10 TABLET ORAL at 08:35

## 2019-06-09 RX ADMIN — LORAZEPAM 0.5 MG: 2 INJECTION INTRAMUSCULAR; INTRAVENOUS at 13:22

## 2019-06-09 RX ADMIN — ACETAMINOPHEN 650 MG: 325 TABLET ORAL at 12:58

## 2019-06-09 RX ADMIN — SODIUM CHLORIDE 125 ML/HR: 0.9 INJECTION, SOLUTION INTRAVENOUS at 05:52

## 2019-06-09 RX ADMIN — LISINOPRIL 10 MG: 10 TABLET ORAL at 08:35

## 2019-06-10 LAB
ANION GAP SERPL CALCULATED.3IONS-SCNC: 8 MMOL/L (ref 4–13)
BUN SERPL-MCNC: 15 MG/DL (ref 5–25)
CALCIUM SERPL-MCNC: 8.8 MG/DL (ref 8.3–10.1)
CHLORIDE SERPL-SCNC: 111 MMOL/L (ref 100–108)
CO2 SERPL-SCNC: 26 MMOL/L (ref 21–32)
CREAT SERPL-MCNC: 0.54 MG/DL (ref 0.6–1.3)
GFR SERPL CREATININE-BSD FRML MDRD: 80 ML/MIN/1.73SQ M
GLUCOSE SERPL-MCNC: 90 MG/DL (ref 65–140)
POTASSIUM SERPL-SCNC: 3.6 MMOL/L (ref 3.5–5.3)
SODIUM SERPL-SCNC: 145 MMOL/L (ref 136–145)

## 2019-06-10 PROCEDURE — 97530 THERAPEUTIC ACTIVITIES: CPT

## 2019-06-10 PROCEDURE — G8979 MOBILITY GOAL STATUS: HCPCS

## 2019-06-10 PROCEDURE — G8987 SELF CARE CURRENT STATUS: HCPCS

## 2019-06-10 PROCEDURE — 99232 SBSQ HOSP IP/OBS MODERATE 35: CPT | Performed by: INTERNAL MEDICINE

## 2019-06-10 PROCEDURE — G8978 MOBILITY CURRENT STATUS: HCPCS

## 2019-06-10 PROCEDURE — 97163 PT EVAL HIGH COMPLEX 45 MIN: CPT

## 2019-06-10 PROCEDURE — 80048 BASIC METABOLIC PNL TOTAL CA: CPT | Performed by: INTERNAL MEDICINE

## 2019-06-10 PROCEDURE — G8988 SELF CARE GOAL STATUS: HCPCS

## 2019-06-10 PROCEDURE — 97112 NEUROMUSCULAR REEDUCATION: CPT

## 2019-06-10 PROCEDURE — 97167 OT EVAL HIGH COMPLEX 60 MIN: CPT

## 2019-06-10 RX ORDER — MELATONIN
1000 DAILY
Status: DISCONTINUED | OUTPATIENT
Start: 2019-06-11 | End: 2019-06-11 | Stop reason: HOSPADM

## 2019-06-10 RX ORDER — HYDRALAZINE HYDROCHLORIDE 20 MG/ML
5 INJECTION INTRAMUSCULAR; INTRAVENOUS EVERY 6 HOURS PRN
Status: DISCONTINUED | OUTPATIENT
Start: 2019-06-10 | End: 2019-06-11 | Stop reason: HOSPADM

## 2019-06-10 RX ADMIN — HYDRALAZINE HYDROCHLORIDE 5 MG: 20 INJECTION INTRAMUSCULAR; INTRAVENOUS at 00:29

## 2019-06-10 RX ADMIN — ALPRAZOLAM 0.5 MG: 0.5 TABLET ORAL at 20:22

## 2019-06-10 RX ADMIN — ACETAMINOPHEN 650 MG: 325 TABLET ORAL at 12:04

## 2019-06-10 RX ADMIN — ENOXAPARIN SODIUM 30 MG: 30 INJECTION SUBCUTANEOUS at 08:06

## 2019-06-10 RX ADMIN — LISINOPRIL 10 MG: 10 TABLET ORAL at 08:06

## 2019-06-10 RX ADMIN — OXYCODONE HYDROCHLORIDE AND ACETAMINOPHEN 500 MG: 500 TABLET ORAL at 08:06

## 2019-06-10 RX ADMIN — ESCITALOPRAM OXALATE 20 MG: 10 TABLET ORAL at 08:06

## 2019-06-11 VITALS
DIASTOLIC BLOOD PRESSURE: 64 MMHG | TEMPERATURE: 97.3 F | RESPIRATION RATE: 18 BRPM | OXYGEN SATURATION: 96 % | HEART RATE: 99 BPM | SYSTOLIC BLOOD PRESSURE: 118 MMHG

## 2019-06-11 LAB
ANION GAP SERPL CALCULATED.3IONS-SCNC: 8 MMOL/L (ref 4–13)
ATRIAL RATE: 66 BPM
BUN SERPL-MCNC: 13 MG/DL (ref 5–25)
CALCIUM SERPL-MCNC: 9.6 MG/DL (ref 8.3–10.1)
CHLORIDE SERPL-SCNC: 110 MMOL/L (ref 100–108)
CO2 SERPL-SCNC: 28 MMOL/L (ref 21–32)
CREAT SERPL-MCNC: 0.62 MG/DL (ref 0.6–1.3)
GFR SERPL CREATININE-BSD FRML MDRD: 77 ML/MIN/1.73SQ M
GLUCOSE SERPL-MCNC: 102 MG/DL (ref 65–140)
P AXIS: 77 DEGREES
PLATELET # BLD AUTO: 156 THOUSANDS/UL (ref 149–390)
PMV BLD AUTO: 10.4 FL (ref 8.9–12.7)
POTASSIUM SERPL-SCNC: 3.6 MMOL/L (ref 3.5–5.3)
PR INTERVAL: 204 MS
QRS AXIS: 66 DEGREES
QRSD INTERVAL: 74 MS
QT INTERVAL: 414 MS
QTC INTERVAL: 434 MS
SODIUM SERPL-SCNC: 146 MMOL/L (ref 136–145)
T WAVE AXIS: 88 DEGREES
VENTRICULAR RATE: 66 BPM

## 2019-06-11 PROCEDURE — 80048 BASIC METABOLIC PNL TOTAL CA: CPT | Performed by: INTERNAL MEDICINE

## 2019-06-11 PROCEDURE — 93010 ELECTROCARDIOGRAM REPORT: CPT | Performed by: INTERNAL MEDICINE

## 2019-06-11 PROCEDURE — 85049 AUTOMATED PLATELET COUNT: CPT | Performed by: INTERNAL MEDICINE

## 2019-06-11 PROCEDURE — 99239 HOSP IP/OBS DSCHRG MGMT >30: CPT | Performed by: INTERNAL MEDICINE

## 2019-06-11 RX ADMIN — LISINOPRIL 10 MG: 10 TABLET ORAL at 08:01

## 2019-06-11 RX ADMIN — ALPRAZOLAM 0.5 MG: 0.5 TABLET ORAL at 05:57

## 2019-06-11 RX ADMIN — OXYCODONE HYDROCHLORIDE AND ACETAMINOPHEN 500 MG: 500 TABLET ORAL at 08:01

## 2019-06-11 RX ADMIN — VITAMIN D, TAB 1000IU (100/BT) 1000 UNITS: 25 TAB at 08:01

## 2019-06-11 RX ADMIN — ENOXAPARIN SODIUM 30 MG: 30 INJECTION SUBCUTANEOUS at 08:01

## 2019-06-11 RX ADMIN — ESCITALOPRAM OXALATE 20 MG: 10 TABLET ORAL at 08:01

## 2019-06-19 ENCOUNTER — TRANSITIONAL CARE MANAGEMENT (OUTPATIENT)
Dept: FAMILY MEDICINE CLINIC | Facility: CLINIC | Age: 84
End: 2019-06-19

## 2019-06-27 ENCOUNTER — TELEPHONE (OUTPATIENT)
Dept: FAMILY MEDICINE CLINIC | Facility: CLINIC | Age: 84
End: 2019-06-27

## 2019-06-27 NOTE — TELEPHONE ENCOUNTER
Okay  Its a little hard to say what is going on   Could we have someone maybe go out to the home and see her if its hard for her to come in?

## 2019-06-27 NOTE — TELEPHONE ENCOUNTER
Daughter called  pt has been in [facility] she started having episode of where her body gets stiff   One day pt was at therapy and she had an episode which caused her to break the foot off the wheel chair   Pt had and ov today but the family was afraid to take her out because when they move her she starts to stiffen up and they don't know if its vertigo because alfonso say she feels like the room is moving or if its anxiety please advise

## 2019-06-28 DIAGNOSIS — R26.9 GAIT ABNORMALITY: Primary | ICD-10-CM

## 2019-06-29 ENCOUNTER — TELEPHONE (OUTPATIENT)
Dept: OTHER | Facility: OTHER | Age: 84
End: 2019-06-29

## 2019-06-29 ENCOUNTER — HOSPITAL ENCOUNTER (INPATIENT)
Facility: HOSPITAL | Age: 84
LOS: 11 days | Discharge: NURSING, REHAB OR LONG-TERM CARE | DRG: 091 | End: 2019-07-10
Attending: EMERGENCY MEDICINE | Admitting: HOSPITALIST
Payer: MEDICARE

## 2019-06-29 DIAGNOSIS — F03.90 DEMENTIA WITHOUT BEHAVIORAL DISTURBANCE, UNSPECIFIED DEMENTIA TYPE (HCC): ICD-10-CM

## 2019-06-29 DIAGNOSIS — F41.0 PANIC ATTACK: ICD-10-CM

## 2019-06-29 DIAGNOSIS — I10 ESSENTIAL HYPERTENSION: ICD-10-CM

## 2019-06-29 DIAGNOSIS — F41.9 ANXIETY: ICD-10-CM

## 2019-06-29 DIAGNOSIS — I48.91 NEW ONSET A-FIB (HCC): ICD-10-CM

## 2019-06-29 DIAGNOSIS — R26.2 AMBULATORY DYSFUNCTION: Primary | ICD-10-CM

## 2019-06-29 LAB
ALBUMIN SERPL BCP-MCNC: 3.1 G/DL (ref 3.5–5)
ALP SERPL-CCNC: 67 U/L (ref 46–116)
ALT SERPL W P-5'-P-CCNC: 81 U/L (ref 12–78)
ANION GAP SERPL CALCULATED.3IONS-SCNC: 6 MMOL/L (ref 4–13)
AST SERPL W P-5'-P-CCNC: 62 U/L (ref 5–45)
BACTERIA UR QL AUTO: ABNORMAL /HPF
BASOPHILS # BLD AUTO: 0.03 THOUSANDS/ΜL (ref 0–0.1)
BASOPHILS NFR BLD AUTO: 1 % (ref 0–1)
BILIRUB SERPL-MCNC: 0.59 MG/DL (ref 0.2–1)
BILIRUB UR QL STRIP: NEGATIVE
BUN SERPL-MCNC: 13 MG/DL (ref 5–25)
CALCIUM SERPL-MCNC: 9.6 MG/DL (ref 8.3–10.1)
CHLORIDE SERPL-SCNC: 108 MMOL/L (ref 100–108)
CLARITY UR: CLEAR
CO2 SERPL-SCNC: 30 MMOL/L (ref 21–32)
COLOR UR: YELLOW
CREAT SERPL-MCNC: 0.72 MG/DL (ref 0.6–1.3)
EOSINOPHIL # BLD AUTO: 0.04 THOUSAND/ΜL (ref 0–0.61)
EOSINOPHIL NFR BLD AUTO: 1 % (ref 0–6)
ERYTHROCYTE [DISTWIDTH] IN BLOOD BY AUTOMATED COUNT: 13.7 % (ref 11.6–15.1)
GFR SERPL CREATININE-BSD FRML MDRD: 71 ML/MIN/1.73SQ M
GLUCOSE SERPL-MCNC: 96 MG/DL (ref 65–140)
GLUCOSE UR STRIP-MCNC: NEGATIVE MG/DL
HCT VFR BLD AUTO: 42 % (ref 34.8–46.1)
HGB BLD-MCNC: 13.3 G/DL (ref 11.5–15.4)
HGB UR QL STRIP.AUTO: ABNORMAL
IMM GRANULOCYTES # BLD AUTO: 0.03 THOUSAND/UL (ref 0–0.2)
IMM GRANULOCYTES NFR BLD AUTO: 1 % (ref 0–2)
KETONES UR STRIP-MCNC: NEGATIVE MG/DL
LEUKOCYTE ESTERASE UR QL STRIP: NEGATIVE
LYMPHOCYTES # BLD AUTO: 1.15 THOUSANDS/ΜL (ref 0.6–4.47)
LYMPHOCYTES NFR BLD AUTO: 18 % (ref 14–44)
MAGNESIUM SERPL-MCNC: 2 MG/DL (ref 1.6–2.6)
MCH RBC QN AUTO: 31.4 PG (ref 26.8–34.3)
MCHC RBC AUTO-ENTMCNC: 31.7 G/DL (ref 31.4–37.4)
MCV RBC AUTO: 99 FL (ref 82–98)
MONOCYTES # BLD AUTO: 0.47 THOUSAND/ΜL (ref 0.17–1.22)
MONOCYTES NFR BLD AUTO: 7 % (ref 4–12)
NEUTROPHILS # BLD AUTO: 4.69 THOUSANDS/ΜL (ref 1.85–7.62)
NEUTS SEG NFR BLD AUTO: 72 % (ref 43–75)
NITRITE UR QL STRIP: NEGATIVE
NON-SQ EPI CELLS URNS QL MICRO: ABNORMAL /HPF
NRBC BLD AUTO-RTO: 0 /100 WBCS
PH UR STRIP.AUTO: 7.5 [PH] (ref 4.5–8)
PHOSPHATE SERPL-MCNC: 3.5 MG/DL (ref 2.3–4.1)
PLATELET # BLD AUTO: 194 THOUSANDS/UL (ref 149–390)
PMV BLD AUTO: 9.5 FL (ref 8.9–12.7)
POTASSIUM SERPL-SCNC: 4.9 MMOL/L (ref 3.5–5.3)
PROT SERPL-MCNC: 6.3 G/DL (ref 6.4–8.2)
PROT UR STRIP-MCNC: NEGATIVE MG/DL
RBC # BLD AUTO: 4.23 MILLION/UL (ref 3.81–5.12)
RBC #/AREA URNS AUTO: ABNORMAL /HPF
SODIUM SERPL-SCNC: 144 MMOL/L (ref 136–145)
SP GR UR STRIP.AUTO: 1.01 (ref 1–1.03)
TROPONIN I SERPL-MCNC: 0.03 NG/ML
TSH SERPL DL<=0.05 MIU/L-ACNC: 0.76 UIU/ML (ref 0.36–3.74)
UROBILINOGEN UR QL STRIP.AUTO: 0.2 E.U./DL
WBC # BLD AUTO: 6.41 THOUSAND/UL (ref 4.31–10.16)
WBC #/AREA URNS AUTO: ABNORMAL /HPF

## 2019-06-29 PROCEDURE — 81001 URINALYSIS AUTO W/SCOPE: CPT

## 2019-06-29 PROCEDURE — 1123F ACP DISCUSS/DSCN MKR DOCD: CPT | Performed by: NURSE PRACTITIONER

## 2019-06-29 PROCEDURE — 83735 ASSAY OF MAGNESIUM: CPT | Performed by: PHYSICIAN ASSISTANT

## 2019-06-29 PROCEDURE — 84443 ASSAY THYROID STIM HORMONE: CPT | Performed by: PHYSICIAN ASSISTANT

## 2019-06-29 PROCEDURE — 84100 ASSAY OF PHOSPHORUS: CPT | Performed by: PHYSICIAN ASSISTANT

## 2019-06-29 PROCEDURE — 84484 ASSAY OF TROPONIN QUANT: CPT | Performed by: PHYSICIAN ASSISTANT

## 2019-06-29 PROCEDURE — 36415 COLL VENOUS BLD VENIPUNCTURE: CPT | Performed by: PHYSICIAN ASSISTANT

## 2019-06-29 PROCEDURE — 85025 COMPLETE CBC W/AUTO DIFF WBC: CPT | Performed by: PHYSICIAN ASSISTANT

## 2019-06-29 PROCEDURE — 99284 EMERGENCY DEPT VISIT MOD MDM: CPT | Performed by: PHYSICIAN ASSISTANT

## 2019-06-29 PROCEDURE — 93005 ELECTROCARDIOGRAM TRACING: CPT

## 2019-06-29 PROCEDURE — 99223 1ST HOSP IP/OBS HIGH 75: CPT | Performed by: PHYSICIAN ASSISTANT

## 2019-06-29 PROCEDURE — 99285 EMERGENCY DEPT VISIT HI MDM: CPT

## 2019-06-29 PROCEDURE — 80053 COMPREHEN METABOLIC PANEL: CPT | Performed by: PHYSICIAN ASSISTANT

## 2019-06-29 RX ORDER — LANOLIN ALCOHOL/MO/W.PET/CERES
6 CREAM (GRAM) TOPICAL
Status: DISCONTINUED | OUTPATIENT
Start: 2019-06-29 | End: 2019-07-10 | Stop reason: HOSPADM

## 2019-06-29 RX ORDER — METOPROLOL TARTRATE 5 MG/5ML
5 INJECTION INTRAVENOUS ONCE
Status: COMPLETED | OUTPATIENT
Start: 2019-06-29 | End: 2019-06-29

## 2019-06-29 RX ORDER — ONDANSETRON 2 MG/ML
4 INJECTION INTRAMUSCULAR; INTRAVENOUS EVERY 6 HOURS PRN
Status: DISCONTINUED | OUTPATIENT
Start: 2019-06-29 | End: 2019-07-10 | Stop reason: HOSPADM

## 2019-06-29 RX ORDER — MECLIZINE HYDROCHLORIDE 25 MG/1
25 TABLET ORAL EVERY 8 HOURS PRN
Status: DISCONTINUED | OUTPATIENT
Start: 2019-06-29 | End: 2019-07-10 | Stop reason: HOSPADM

## 2019-06-29 RX ORDER — ALPRAZOLAM 0.25 MG/1
0.25 TABLET ORAL 4 TIMES DAILY PRN
Status: DISCONTINUED | OUTPATIENT
Start: 2019-06-29 | End: 2019-07-03

## 2019-06-29 RX ORDER — ASPIRIN 81 MG/1
81 TABLET, CHEWABLE ORAL DAILY
Status: DISCONTINUED | OUTPATIENT
Start: 2019-06-30 | End: 2019-07-10 | Stop reason: HOSPADM

## 2019-06-29 RX ORDER — ACETAMINOPHEN 325 MG/1
650 TABLET ORAL EVERY 6 HOURS PRN
COMMUNITY

## 2019-06-29 RX ORDER — HEPARIN SODIUM 5000 [USP'U]/ML
5000 INJECTION, SOLUTION INTRAVENOUS; SUBCUTANEOUS EVERY 8 HOURS SCHEDULED
Status: DISCONTINUED | OUTPATIENT
Start: 2019-06-29 | End: 2019-06-29

## 2019-06-29 RX ORDER — ESCITALOPRAM OXALATE 10 MG/1
20 TABLET ORAL DAILY
Status: DISCONTINUED | OUTPATIENT
Start: 2019-06-30 | End: 2019-07-10 | Stop reason: HOSPADM

## 2019-06-29 RX ORDER — ACETAMINOPHEN 325 MG/1
650 TABLET ORAL EVERY 6 HOURS PRN
Status: DISCONTINUED | OUTPATIENT
Start: 2019-06-29 | End: 2019-07-10 | Stop reason: HOSPADM

## 2019-06-29 RX ADMIN — MELATONIN TAB 3 MG 6 MG: 3 TAB at 21:30

## 2019-06-29 RX ADMIN — ACETAMINOPHEN 650 MG: 325 TABLET ORAL at 22:14

## 2019-06-29 RX ADMIN — ALPRAZOLAM 0.25 MG: 0.25 TABLET ORAL at 22:34

## 2019-06-29 RX ADMIN — METOPROLOL TARTRATE 12.5 MG: 25 TABLET ORAL at 21:30

## 2019-06-29 RX ADMIN — METOPROLOL TARTRATE 5 MG: 1 INJECTION, SOLUTION INTRAVENOUS at 20:35

## 2019-06-29 RX ADMIN — ENOXAPARIN SODIUM 30 MG: 30 INJECTION SUBCUTANEOUS at 21:30

## 2019-06-29 NOTE — ED NOTES
Family reports that pt was sent in for increasing number of, "Locking up" when attempting to stand-up and ambulate  Daughter is concerned that when she is home with pt, pt will fall        Arnaud Gonzalez RN  06/29/19 5713

## 2019-06-29 NOTE — SOCIAL WORK
CM consulted to speak with family about placement  Pt had recently been discharged from [facility] for [address]  She lives at home alone but family has been making frequent visits in the morning and evening  [facility] was supposedly arranged by physician's office but they were not aware  [facility] was not able to start care until Monday so pt's dtr called EMS to bring pt to the ED  Pt's daughter reports that physically, her mother is strong  However, pt has been having frequent panic attacks that leave her frozen and unable to move  Family struggles caring for her when she has these episodes  CM agrees with, TOMA Valero's recommendation that pt should be seen by psych and possibly be placed in a [facility] unit  Pt's dtr reports that they had an appointment for pt to see [facility] but was unable to make it there  She also reports that pt may have dementia but it was never diagnosed  CM advised PA to go ahead and put the consult in for pysch  Once pt is seen and there is a recommendation, crisis can work on placement if needed

## 2019-06-29 NOTE — ED NOTES
Met with patient (family at bedside) and completed the crisis intake assessment as well as the safety risk assessment  Patient appears anxious but cooperative  Patient and family state patient has no psychiatric history and are uncertain of what they are looking for at this time  Patient had a fall a few weeks ago and was in [facility], since her return home she has been increasingly anxious and fearful of walking  "patient states she feels weak and like she is going to fall"  Patient denies SI/HI as well as hallucinations  Patient reports stable sleep, appetite, concentration, and motivation  Case was reviewed with ER MD, we will be moving forward with inpatient medical admission for ambulatory dysfunction  If needed a psychiatric consult could be placed for medication evaluation for anxiety  Family aware and in agreement with plan at this time

## 2019-06-29 NOTE — ED NOTES
Unable to flush pt's IV at this time  Will re-attempt if necessary        Ananya Erazo RN  06/29/19 6738

## 2019-06-29 NOTE — ED PROVIDER NOTES
History  Chief Complaint   Patient presents with    Tremors     Pt sent in from home that she shares with her family because of increased tremors, and difficulty walking  Patient is a 41-year-old female who presents today with daughter for chief complaint of anxiety attacks which the daughter reports manifests in tremors  Patient's daughter reports the patient fell recently and was admitted at the beginning of this month for 3 days and then had [place] [facility]  Patient's daughter reports since the fall and admission the patient has had less and less confidence with standing up and walking and has had what seemed to be panic attacks any time walking is required of the patient  Patient's daughter reports the patient is anxious and hesitant to use a walker around the house and so remains on the couch for extended periods of time, becoming anxious when she needs to use the restroom as this requires walking  Patient's daughter reports the patient has not had any complaints of chest pain or any episodes of shortness of breath  Patient's daughter notes the patient has not had any fevers or chills, abdominal pain or nausea or vomiting  Patient's daughter reports the patient is eating and drinking as per normal       History provided by:  Patient   used: No    Anxiety   Patient accompanied by:  Caregiver  Degree of incapacity (severity): Moderate  Onset quality:  Gradual  Duration:  2 weeks  Timing:  Intermittent  Progression:  Waxing and waning  Chronicity:  New  Context comment:  Recent fall  Treatment compliance:  Untreated  Relieved by:  None tried  Worsened by:  Nothing  Ineffective treatments:  None tried  Associated symptoms: anxiety    Associated symptoms: no abdominal pain, no chest pain and no fatigue        Prior to Admission Medications   Prescriptions Last Dose Informant Patient Reported? Taking?    Ascorbic Acid, Vitamin C, (VITAMIN C) 100 MG tablet  Self Yes Yes   Sig: Take 500 mg by mouth   Biotin 1000 MCG tablet  Self Yes No   Sig: Take 1,000 mcg by mouth   MECLIZINE HCL PO   Yes Yes   Sig: Take 25 mg by mouth every 8 (eight) hours as needed   Multiple Vitamin (THERA-TABS PO)   Yes Yes   Sig: Take by mouth   Multiple Vitamins-Minerals (MULTIVITAL-M) TABS  Self Yes Yes   Sig: Take 1 tablet by mouth   Zinc 50 MG TABS  Self Yes No   Sig: Take 1 tablet by mouth   acetaminophen (TYLENOL) 325 mg tablet   Yes Yes   Sig: Take 650 mg by mouth every 6 (six) hours as needed for mild pain   aspirin 81 mg chewable tablet  Self Yes Yes   Sig: Chew 1 tablet once    calcium-vitamin D (OSCAL) 250-125 MG-UNIT per tablet  Self Yes Yes   Sig: Take 1 tablet by mouth   escitalopram (LEXAPRO) 20 mg tablet   No Yes   Sig: Take 1 tablet (20 mg total) by mouth daily   fexofenadine (ALLEGRA) 180 MG tablet  Self Yes Yes   Sig: Take 60 mg by mouth 2 (two) times a day    lisinopril (ZESTRIL) 10 mg tablet   No Yes   Sig: Take 1 tablet by mouth daily   menthol-zinc oxide (CALMOSEPTINE) 0 44-20 6 % OINT   Yes Yes   Sig: Apply topically 2 (two) times a day   vitamin E, tocopherol, 400 units capsule  Self Yes No   Sig: Take 1 capsule by mouth      Facility-Administered Medications: None       Past Medical History:   Diagnosis Date    HTN (hypertension)        History reviewed  No pertinent surgical history  Family History   Problem Relation Age of Onset    Stroke Father     Suicidality Sister      I have reviewed and agree with the history as documented  Social History     Tobacco Use    Smoking status: Former Smoker     Types: Cigarettes     Last attempt to quit: 3/20/1965     Years since quittin 3    Smokeless tobacco: Never Used   Substance Use Topics    Alcohol use:  Yes     Alcohol/week: 1 0 standard drinks     Types: 1 Glasses of wine per week     Frequency: 2-3 times a week     Drinks per session: 1 or 2     Comment: social     Drug use: No        Review of Systems   Constitutional: Negative for chills, fatigue and fever  HENT: Negative for congestion, ear pain, rhinorrhea and sore throat  Eyes: Negative for redness  Respiratory: Negative for chest tightness and shortness of breath  Cardiovascular: Negative for chest pain and palpitations  Gastrointestinal: Negative for abdominal pain, nausea and vomiting  Genitourinary: Negative for dysuria and hematuria  Musculoskeletal: Negative  Skin: Negative for rash  Neurological: Positive for tremors  Negative for dizziness, syncope, light-headedness and numbness  Psychiatric/Behavioral: The patient is nervous/anxious  Physical Exam  Physical Exam   Constitutional: She is oriented to person, place, and time  She appears well-developed and well-nourished  Frail appearing thin female, anxious   HENT:   Head: Normocephalic  Eyes: No scleral icterus  Cardiovascular: Normal rate and regular rhythm  Pulmonary/Chest: Effort normal and breath sounds normal  No stridor  Abdominal: Soft  She exhibits no distension  There is no tenderness  Musculoskeletal: Normal range of motion  Patient with some rigidity at end of provider exam  Equal strength b/l upper and lower extremities  Neurological: She is alert and oriented to person, place, and time  Finger to nose testing intact, upper and lower extremities with equal bilateral strength  Skin: Skin is warm and dry  Capillary refill takes less than 2 seconds  Psychiatric: She has a normal mood and affect  Nursing note and vitals reviewed        Vital Signs  ED Triage Vitals [06/29/19 1252]   Temperature Pulse Respirations Blood Pressure SpO2   97 7 °F (36 5 °C) 105 16 141/87 96 %      Temp Source Heart Rate Source Patient Position - Orthostatic VS BP Location FiO2 (%)   Oral Monitor Lying Right arm --      Pain Score       No Pain           Vitals:    06/29/19 1938 06/29/19 2130 06/29/19 2243 06/30/19 0746   BP: 152/81 142/97 (!) 138/101 142/86   Pulse: (!) 111 86 94 68   Patient Position - Orthostatic VS: Lying  Lying Sitting         Visual Acuity  Visual Acuity      Most Recent Value   L Pupil Size (mm)  2   R Pupil Size (mm)  2          ED Medications  Medications   aspirin chewable tablet 81 mg (has no administration in time range)   escitalopram (LEXAPRO) tablet 20 mg (has no administration in time range)   meclizine (ANTIVERT) tablet 25 mg (25 mg Oral Given 6/30/19 0039)   ondansetron (ZOFRAN) injection 4 mg (has no administration in time range)   acetaminophen (TYLENOL) tablet 650 mg (650 mg Oral Given 6/29/19 2214)   enoxaparin (LOVENOX) subcutaneous injection 30 mg (30 mg Subcutaneous Given 6/29/19 2130)   ALPRAZolam (XANAX) tablet 0 25 mg (0 25 mg Oral Given 6/29/19 2234)   melatonin tablet 6 mg (6 mg Oral Given 6/29/19 2130)   metoprolol tartrate (LOPRESSOR) partial tablet 12 5 mg (12 5 mg Oral Given 6/29/19 2130)   metoprolol (LOPRESSOR) injection 5 mg (5 mg Intravenous Given 6/29/19 2035)       Diagnostic Studies  Results Reviewed     Procedure Component Value Units Date/Time    TSH, 3rd generation [[id number]]  (Normal) Collected:  06/29/19 1402    Lab Status:  Final result Specimen:  Blood from Arm, Right Updated:  06/29/19 1604     TSH 3RD GENERATON 0 763 uIU/mL     Narrative:       Patients undergoing fluorescein dye angiography may retain small amounts of fluorescein in the body for 48-72 hours post procedure  Samples containing fluorescein can produce falsely depressed TSH values  If the patient had this procedure,a specimen should be resubmitted post fluorescein clearance        Urine Microscopic [[id number]]  (Abnormal) Collected:  06/29/19 1434    Lab Status:  Final result Specimen:  Urine, Clean Catch Updated:  06/29/19 1508     RBC, UA 2-4 /hpf      WBC, UA 0-1 /hpf      Epithelial Cells Occasional /hpf      Bacteria, UA Occasional /hpf     Troponin I [[id number]]  (Normal) Collected:  06/29/19 1402    Lab Status:  Final result Specimen:  Blood from Arm, Right Updated:  06/29/19 1434     Troponin I 0 03 ng/mL     Comprehensive metabolic panel [[id number]]  (Abnormal) Collected:  06/29/19 1402    Lab Status:  Final result Specimen:  Blood from Arm, Right Updated:  06/29/19 1431     Sodium 144 mmol/L      Potassium 4 9 mmol/L      Chloride 108 mmol/L      CO2 30 mmol/L      ANION GAP 6 mmol/L      BUN 13 mg/dL      Creatinine 0 72 mg/dL      Glucose 96 mg/dL      Calcium 9 6 mg/dL      AST 62 U/L      ALT 81 U/L      Alkaline Phosphatase 67 U/L      Total Protein 6 3 g/dL      Albumin 3 1 g/dL      Total Bilirubin 0 59 mg/dL      eGFR 71 ml/min/1 73sq m     Narrative:       [place] guidelines for Chronic Kidney Disease (CKD):     Stage 1 with normal or high GFR (GFR > 90 mL/min/1 73 square meters)    Stage 2 Mild CKD (GFR = 60-89 mL/min/1 73 square meters)    Stage 3A Moderate CKD (GFR = 45-59 mL/min/1 73 square meters)    Stage 3B Moderate CKD (GFR = 30-44 mL/min/1 73 square meters)    Stage 4 Severe CKD (GFR = 15-29 mL/min/1 73 square meters)    Stage 5 End Stage CKD (GFR <15 mL/min/1 73 square meters)  Note: GFR calculation is accurate only with a steady state creatinine    Phosphorus [[id number]]  (Normal) Collected:  06/29/19 1402    Lab Status:  Final result Specimen:  Blood from Arm, Right Updated:  06/29/19 1431     Phosphorus 3 5 mg/dL     Magnesium [[id number]]  (Normal) Collected:  06/29/19 1402    Lab Status:  Final result Specimen:  Blood from Arm, Right Updated:  06/29/19 1431     Magnesium 2 0 mg/dL     ED Urine Macroscopic [[id number]]  (Abnormal) Collected:  06/29/19 1434    Lab Status:  Final result Specimen:  Urine Updated:  06/29/19 1423     Color, UA Yellow     Clarity, UA Clear     pH, UA 7 5     Leukocytes, UA Negative     Nitrite, UA Negative     Protein, UA Negative mg/dl      Glucose, UA Negative mg/dl      Ketones, UA Negative mg/dl      Urobilinogen, UA 0 2 E U /dl      Bilirubin, UA Negative     Blood, UA Trace     Specific [place], UA 1 015    Narrative:       CLINITEK RESULT    CBC and differential [[id number]]  (Abnormal) Collected:  06/29/19 1402    Lab Status:  Final result Specimen:  Blood from Arm, Right Updated:  06/29/19 1412     WBC 6 41 Thousand/uL      RBC 4 23 Million/uL      Hemoglobin 13 3 g/dL      Hematocrit 42 0 %      MCV 99 fL      MCH 31 4 pg      MCHC 31 7 g/dL      RDW 13 7 %      MPV 9 5 fL      Platelets 976 Thousands/uL      nRBC 0 /100 WBCs      Neutrophils Relative 72 %      Immat GRANS % 1 %      Lymphocytes Relative 18 %      Monocytes Relative 7 %      Eosinophils Relative 1 %      Basophils Relative 1 %      Neutrophils Absolute 4 69 Thousands/µL      Immature Grans Absolute 0 03 Thousand/uL      Lymphocytes Absolute 1 15 Thousands/µL      Monocytes Absolute 0 47 Thousand/µL      Eosinophils Absolute 0 04 Thousand/µL      Basophils Absolute 0 03 Thousands/µL                  No orders to display              Procedures  ECG 12 Lead Documentation Only  Date/Time: 6/29/2019 3:28 PM  Performed by: Tank Calvillo PA-C  Authorized by: Tank Calvillo PA-C     Indications / Diagnosis:  Need for inpatient admission / tremors  ECG reviewed by me, the ED Provider: yes    Patient location:  ED  Previous ECG:     Previous ECG:  Compared to current    Comparison ECG info:  06/07/2019    Similarity:  No change    Comparison to cardiac monitor: Yes    Interpretation:     Interpretation: normal    Quality:     Tracing quality:  Limited by artifact  Rate:     ECG rate:  99    ECG rate assessment: normal    Rhythm:     Rhythm: atrial fibrillation      Rhythm comment:  Patient with previous PACs on last EKG, EKG reading A-fib, however upon discussion with Dr Saul, felt to be irregular secondary to ectopy / PACs  Ectopy:     Ectopy: none    QRS:     QRS axis:  Normal    QRS intervals:  Normal  ST segments:     ST segments:  Normal  T waves:     T waves: normal ED Course  ED Course as of Jun 30 0933   Sat Jun 29, 2019   [address] Case Management contacted and recommending discussion with family in regards to placement  [id number] SCORE      Most Recent Value   [id number]   Age  2 Filed at: 06/29/2019 2017   Sex  1 Filed at: 06/29/2019 2017   CHF History  0 Filed at: 06/29/2019 2017   HTN History  1 Filed at: 06/29/2019 2017   Stroke or TIA Symptoms Previously  0 Filed at: 06/29/2019 2017   Vascular Disease History  0 Filed at: 06/29/2019 2017   Diabetes History  0 Filed at: 06/29/2019 2017   [id number] Score  4 Filed at: 06/29/2019 2017                              MDM    Disposition  Final diagnoses:   Ambulatory dysfunction     Time reflects when diagnosis was documented in both MDM as applicable and the Disposition within this note     Time User Action Codes Description Comment    6/29/2019  6:24 PM Bonniepraveena Lynn Add [R26 2] Ambulatory dysfunction     6/29/2019  7:07 PM Price Velarde Add [I48 91] New onset a-fib ([address] )     6/29/2019  7:08 PM Marion Vaughan Add [F41 0] Panic attack     6/29/2019  7:08 PM Marion Vaughan Add [F41 9] Anxiety       ED Disposition     ED Disposition Condition Date/Time Comment    Admit Stable Sat Jun 29, 2019  6:25 PM Case was discussed with price and the patient's admission status was agreed to be Admission Status: inpatient status to the service of Dr Monahan Peers           Follow-up Information    None         Current Discharge Medication List      CONTINUE these medications which have NOT CHANGED    Details   acetaminophen (TYLENOL) 325 mg tablet Take 650 mg by mouth every 6 (six) hours as needed for mild pain      Ascorbic Acid, Vitamin C, (VITAMIN C) 100 MG tablet Take 500 mg by mouth      aspirin 81 mg chewable tablet Chew 1 tablet once       calcium-vitamin D (OSCAL) 250-125 MG-UNIT per tablet Take 1 tablet by mouth      escitalopram (LEXAPRO) 20 mg tablet Take 1 tablet (20 mg total) by mouth daily  Qty: 90 tablet, Refills: 3    Associated Diagnoses: Anxiety state      fexofenadine (ALLEGRA) 180 MG tablet Take 60 mg by mouth 2 (two) times a day       lisinopril (ZESTRIL) 10 mg tablet Take 1 tablet by mouth daily  Qty: 90 tablet, Refills: 0    Comments: Refill [id number]  Associated Diagnoses: Essential hypertension      MECLIZINE HCL PO Take 25 mg by mouth every 8 (eight) hours as needed      menthol-zinc oxide (CALMOSEPTINE) 0 44-20 6 % OINT Apply topically 2 (two) times a day      Multiple Vitamin (THERA-TABS PO) Take by mouth      Multiple Vitamins-Minerals (MULTIVITAL-M) TABS Take 1 tablet by mouth      Biotin 1000 MCG tablet Take 1,000 mcg by mouth      vitamin E, tocopherol, 400 units capsule Take 1 capsule by mouth      Zinc 50 MG TABS Take 1 tablet by mouth           No discharge procedures on file      ED Provider  Electronically Signed by           Jeane Norton PA-C  06/30/19 9717

## 2019-06-29 NOTE — ED NOTES
Crisis Rodrigo Barboza responded and states that she will not return and to page the next crisis worker at 1600        France Mendoza RN  06/29/19 7538

## 2019-06-29 NOTE — ED NOTES
Pt requested some pain relief  Per attending PA, Pt only allowed heat at this time       Ana Meneses  06/29/19 3260

## 2019-06-29 NOTE — ED NOTES
Pt is very anxious, becoming entirely stiff with her limbs, and grasping the railings at tight as she can  Pt states, "Help! Help!" Pt reassured she is not falling, but laying in bed        Celsa Pino RN  06/29/19 1458

## 2019-06-29 NOTE — ED NOTES
Case management to evaluate pt        Vivian Carter RN  06/29/19 [address] Yoselin RN  06/29/19 1527

## 2019-06-29 NOTE — ED NOTES
Spoke with Vick Guerra at [facility] to add on TSH, and call back with results        Lori Hernandez RN  06/29/19 0529

## 2019-06-30 ENCOUNTER — APPOINTMENT (INPATIENT)
Dept: CT IMAGING | Facility: HOSPITAL | Age: 84
DRG: 091 | End: 2019-06-30
Payer: MEDICARE

## 2019-06-30 PROBLEM — E87.0 HYPERNATREMIA: Status: ACTIVE | Noted: 2019-06-30

## 2019-06-30 LAB
ALBUMIN SERPL BCP-MCNC: 2.8 G/DL (ref 3.5–5)
ALP SERPL-CCNC: 60 U/L (ref 46–116)
ALT SERPL W P-5'-P-CCNC: 68 U/L (ref 12–78)
ANION GAP SERPL CALCULATED.3IONS-SCNC: 7 MMOL/L (ref 4–13)
AST SERPL W P-5'-P-CCNC: 51 U/L (ref 5–45)
BILIRUB SERPL-MCNC: 0.48 MG/DL (ref 0.2–1)
BUN SERPL-MCNC: 16 MG/DL (ref 5–25)
CALCIUM SERPL-MCNC: 9.4 MG/DL (ref 8.3–10.1)
CHLORIDE SERPL-SCNC: 108 MMOL/L (ref 100–108)
CK MB SERPL-MCNC: 2.2 % (ref 0–2.5)
CK MB SERPL-MCNC: 4.8 NG/ML (ref 0–5)
CK SERPL-CCNC: 217 U/L (ref 26–192)
CO2 SERPL-SCNC: 31 MMOL/L (ref 21–32)
CREAT SERPL-MCNC: 0.81 MG/DL (ref 0.6–1.3)
FOLATE SERPL-MCNC: >20 NG/ML (ref 3.1–17.5)
GFR SERPL CREATININE-BSD FRML MDRD: 62 ML/MIN/1.73SQ M
GLUCOSE SERPL-MCNC: 92 MG/DL (ref 65–140)
POTASSIUM SERPL-SCNC: 4 MMOL/L (ref 3.5–5.3)
PROT SERPL-MCNC: 5.7 G/DL (ref 6.4–8.2)
SODIUM SERPL-SCNC: 146 MMOL/L (ref 136–145)
VIT B12 SERPL-MCNC: 1106 PG/ML (ref 100–900)

## 2019-06-30 PROCEDURE — 99233 SBSQ HOSP IP/OBS HIGH 50: CPT | Performed by: INTERNAL MEDICINE

## 2019-06-30 PROCEDURE — 70450 CT HEAD/BRAIN W/O DYE: CPT

## 2019-06-30 PROCEDURE — 82746 ASSAY OF FOLIC ACID SERUM: CPT | Performed by: PHYSICIAN ASSISTANT

## 2019-06-30 PROCEDURE — 82550 ASSAY OF CK (CPK): CPT | Performed by: PHYSICIAN ASSISTANT

## 2019-06-30 PROCEDURE — 82607 VITAMIN B-12: CPT | Performed by: PHYSICIAN ASSISTANT

## 2019-06-30 PROCEDURE — 99221 1ST HOSP IP/OBS SF/LOW 40: CPT | Performed by: INTERNAL MEDICINE

## 2019-06-30 PROCEDURE — 82553 CREATINE MB FRACTION: CPT | Performed by: PHYSICIAN ASSISTANT

## 2019-06-30 PROCEDURE — 80053 COMPREHEN METABOLIC PANEL: CPT | Performed by: PHYSICIAN ASSISTANT

## 2019-06-30 RX ORDER — DIAZEPAM 5 MG/ML
2.5 INJECTION, SOLUTION INTRAMUSCULAR; INTRAVENOUS EVERY 8 HOURS PRN
Status: DISCONTINUED | OUTPATIENT
Start: 2019-06-30 | End: 2019-07-09

## 2019-06-30 RX ORDER — SODIUM CHLORIDE 450 MG/100ML
75 INJECTION, SOLUTION INTRAVENOUS ONCE
Status: COMPLETED | OUTPATIENT
Start: 2019-06-30 | End: 2019-07-01

## 2019-06-30 RX ADMIN — METOPROLOL TARTRATE 12.5 MG: 25 TABLET ORAL at 09:36

## 2019-06-30 RX ADMIN — MECLIZINE HYDROCHLORIDE 25 MG: 25 TABLET ORAL at 00:39

## 2019-06-30 RX ADMIN — MECLIZINE HYDROCHLORIDE 25 MG: 25 TABLET ORAL at 17:42

## 2019-06-30 RX ADMIN — ESCITALOPRAM OXALATE 20 MG: 10 TABLET ORAL at 09:36

## 2019-06-30 RX ADMIN — MELATONIN TAB 3 MG 6 MG: 3 TAB at 21:07

## 2019-06-30 RX ADMIN — DIAZEPAM 2.5 MG: 5 INJECTION, SOLUTION INTRAMUSCULAR; INTRAVENOUS at 17:43

## 2019-06-30 RX ADMIN — SODIUM CHLORIDE 75 ML/HR: 0.45 INJECTION, SOLUTION INTRAVENOUS at 14:29

## 2019-06-30 RX ADMIN — ALPRAZOLAM 0.25 MG: 0.25 TABLET ORAL at 19:56

## 2019-06-30 RX ADMIN — ENOXAPARIN SODIUM 30 MG: 30 INJECTION SUBCUTANEOUS at 09:36

## 2019-06-30 RX ADMIN — MECLIZINE HYDROCHLORIDE 25 MG: 25 TABLET ORAL at 09:36

## 2019-06-30 RX ADMIN — METOPROLOL TARTRATE 12.5 MG: 25 TABLET ORAL at 21:07

## 2019-06-30 RX ADMIN — ALPRAZOLAM 0.25 MG: 0.25 TABLET ORAL at 14:28

## 2019-06-30 RX ADMIN — ASPIRIN 81 MG 81 MG: 81 TABLET ORAL at 09:36

## 2019-06-30 NOTE — PLAN OF CARE
Problem: Potential for Falls  Goal: Patient will remain free of falls  Description  INTERVENTIONS:  - Assess patient frequently for physical needs  -  Identify cognitive and physical deficits and behaviors that affect risk of falls    -  Percy fall precautions as indicated by assessment   - Educate patient/family on patient safety including physical limitations  - Instruct patient to call for assistance with activity based on assessment  - Modify environment to reduce risk of injury  - Consider OT/PT consult to assist with strengthening/mobility  Outcome: Progressing     Problem: PAIN - ADULT  Goal: Verbalizes/displays adequate comfort level or baseline comfort level  Description  Interventions:  - Encourage patient to monitor pain and request assistance  - Assess pain using appropriate pain scale  - Administer analgesics based on type and severity of pain and evaluate response  - Implement non-pharmacological measures as appropriate and evaluate response  - Consider cultural and social influences on pain and pain management  - Notify physician/advanced practitioner if interventions unsuccessful or patient reports new pain  Outcome: Progressing     Problem: SAFETY ADULT  Goal: Maintain or return to baseline ADL function  Description  INTERVENTIONS:  -  Assess patient's ability to carry out ADLs; assess patient's baseline for ADL function and identify physical deficits which impact ability to perform ADLs (bathing, care of mouth/teeth, toileting, grooming, dressing, etc )  - Assess/evaluate cause of self-care deficits   - Assess range of motion  - Assess patient's mobility; develop plan if impaired  - Assess patient's need for assistive devices and provide as appropriate  - Encourage maximum independence but intervene and supervise when necessary  ¯ Involve family in performance of ADLs  ¯ Assess for home care needs following discharge   ¯ Request OT consult to assist with ADL evaluation and planning for discharge  ¯ Provide patient education as appropriate  Outcome: Progressing  Goal: Maintain or return mobility status to optimal level  Description  INTERVENTIONS:  - Assess patient's baseline mobility status (ambulation, transfers, stairs, etc )    - Identify cognitive and physical deficits and behaviors that affect mobility  - Identify mobility aids required to assist with transfers and/or ambulation (gait belt, sit-to-stand, lift, walker, cane, etc )  - [place] fall precautions as indicated by assessment  - Record patient progress and toleration of activity level on Mobility SBAR; progress patient to next Phase/Stage  - Instruct patient to call for assistance with activity based on assessment  - Request Rehabilitation consult to assist with strengthening/weightbearing, etc   Outcome: Progressing     Problem: DISCHARGE PLANNING  Goal: Discharge to home or other facility with appropriate resources  Description  INTERVENTIONS:  - Identify barriers to discharge w/patient and caregiver  - Arrange for needed discharge resources and transportation as appropriate  - Identify discharge learning needs (meds, wound care, etc )  - Arrange for interpretive services to assist at discharge as needed  - Refer to Case Management Department for coordinating discharge planning if the patient needs post-hospital services based on physician/advanced practitioner order or complex needs related to functional status, cognitive ability, or social support system  Outcome: Progressing     Problem: Knowledge Deficit  Goal: Patient/family/caregiver demonstrates understanding of disease process, treatment plan, medications, and discharge instructions  Description  Complete learning assessment and assess knowledge base    Interventions:  - Provide teaching at level of understanding  - Provide teaching via preferred learning methods  Outcome: Progressing     Problem: CARDIOVASCULAR - ADULT  Goal: Maintains optimal cardiac output and hemodynamic stability  Description  INTERVENTIONS:  - Monitor I/O, vital signs and rhythm  - Monitor for S/S and trends of decreased cardiac output i e  bleeding, hypotension  - Administer and titrate ordered vasoactive medications to optimize hemodynamic stability  - Assess quality of pulses, skin color and temperature  - Assess for signs of decreased coronary artery perfusion - ex   Angina  - Instruct patient to report change in severity of symptoms  Outcome: Progressing  Goal: Absence of cardiac dysrhythmias or at baseline rhythm  Description  INTERVENTIONS:  - Continuous cardiac monitoring, monitor vital signs, obtain 12 lead EKG if indicated  - Administer antiarrhythmic and heart rate control medications as ordered  - Monitor electrolytes and administer replacement therapy as ordered  Outcome: Progressing     Problem: MUSCULOSKELETAL - ADULT  Goal: Maintain or return mobility to safest level of function  Description  INTERVENTIONS:  - Assess patient's ability to carry out ADLs; assess patient's baseline for ADL function and identify physical deficits which impact ability to perform ADLs (bathing, care of mouth/teeth, toileting, grooming, dressing, etc )  - Assess/evaluate cause of self-care deficits   - Assess range of motion  - Assess patient's mobility; develop plan if impaired  - Assess patient's need for assistive devices and provide as appropriate  - Encourage maximum independence but intervene and supervise when necessary  - Involve family in performance of ADLs  - Assess for home care needs following discharge   - Request OT consult to assist with ADL evaluation and planning for discharge  - Provide patient education as appropriate  Outcome: Progressing  Goal: Maintain proper alignment of affected body part  Description  INTERVENTIONS:  - Support, maintain and protect limb and body alignment  - Provide pt/fam with appropriate education  Outcome: Progressing

## 2019-06-30 NOTE — CONSULTS
Consult - Cardiology   Sathya Rhoades 80 y o  female MRN: [id number]  Unit/Bed#: [place] 2 -01 Encounter: [id number]        Reason For Consult:  Atrial fibrillation               ASSESSMENT:  1  Atrial fibrillation with RVR, new onset, heart rate trending   2  Ambulatory dysfunction with frequent falls  3  Dementia  4  Anxiety    PLAN:     1  Atrial fibrillation:   -continue rate control with Lopressor 12 5 b i d    -cautious to increased dose given her age frequent falls   -will observe on present dose, increased slowly if needed   -check echocardiogram   -though Chads Vasc 4 (Age, HTN, Gender) would hold off on [facility] due to frequent falls   -check echocardiogram tomorrow   -agree with stopping Lisinopril to allow for BP    2  Ambulatory dysfunction/ anxiety/ confusion   -workup per [facility]        History Of Present Illness: This is a 60-year-old female with a relatively unremarkable past medical history  With the exception of historically high blood pressure she has no problems with her heart that she knows of  She has never been seen by a cardiologist in the past   She was recently hospitalized in early June secondary to sustaining a fall at home  Prior to this time she was living by herself in an apartment with family nearby who frequently visited and helped with ADLs  She was admitted 06/06 until 6/11 with ambulatory dysfunction  Afterwards she was discharged to [facility]  Daughter at bedside reports that she has been having increasing anxiety and confusion over the last few weeks  She was discharged from rehabilitation 2-3 days ago  Ms Asa Michael was brought to the hospital by her daughter yesterday on 06/29/2019  History was almost completely provided by daughter at bedside secondary to patient confusion anxiety  Since coming home from rehab the patient has been extremely anxious to the point where she has been unable to walk    She is requiring assistance almost completely in all of her ADLs  Her daughter has been the only one around to help her at home in recent days and states that she feels she is physically unable to provide the level of care that her mother needs  For this reason she brought her to the hospital today  Upon arrival she had an ECG performed which showed atrial fibrillation with a rapid ventricular response  She has no history of this  The patient's daughter and the patient herself reports no symptoms from her rapid heart rate  She has no chest pain, palpitations, dizziness, or shortness of breath  Presently at the time of consultation she is still on atrial fibrillation with a heart rate trending   Past Medical History:        Past Medical History:   Diagnosis Date    HTN (hypertension)     History reviewed  No pertinent surgical history  Allergy:        Allergies   Allergen Reactions    Cephalexin      Other reaction(s): thrush    Sulfa Antibiotics Other (See Comments)       Medications:       Prior to Admission medications    Medication Sig Start Date End Date Taking?  Authorizing Provider   acetaminophen (TYLENOL) 325 mg tablet Take 650 mg by mouth every 6 (six) hours as needed for mild pain   Yes Historical Provider, MD   Ascorbic Acid, Vitamin C, (VITAMIN C) 100 MG tablet Take 500 mg by mouth   Yes Historical Provider, MD   aspirin 81 mg chewable tablet Chew 1 tablet once  5/5/11  Yes Historical Provider, MD   calcium-vitamin D ([address]) 250-125 MG-UNIT per tablet Take 1 tablet by mouth 11/28/11  Yes Historical Provider, MD   escitalopram (LEXAPRO) 20 mg tablet Take 1 tablet (20 mg total) by mouth daily 9/26/18  Yes Noah Melo DO   fexofenadine (ALLEGRA) 180 MG tablet Take 60 mg by mouth 2 (two) times a day  4/10/13  Yes Historical Provider, MD   lisinopril (ZESTRIL) 10 mg tablet Take 1 tablet by mouth daily 4/26/19  Yes Noah Melo DO   MECLIZINE HCL PO Take 25 mg by mouth every 8 (eight) hours as needed   Yes Historical Provider, MD   menthol-zinc oxide (CALMOSEPTINE) 0 44-20 6 % OINT Apply topically 2 (two) times a day   Yes Historical Provider, MD   Multiple Vitamin (THERA-TABS PO) Take by mouth   Yes Historical Provider, MD   Multiple Vitamins-Minerals (MULTIVITAL-M) TABS Take 1 tablet by mouth 11  Yes Historical Provider, MD   Biotin 1000 MCG tablet Take 1,000 mcg by mouth 17  Historical Provider, MD   vitamin E, tocopherol, 400 units capsule Take 1 capsule by mouth 11   Historical Provider, MD   Zinc 50 MG TABS Take 1 tablet by mouth 11   Historical Provider, MD       Family History:     Family History   Problem Relation Age of Onset    Stroke Father     Suicidality Sister         Social History:       Social History     Socioeconomic History    Marital status:      Spouse name: None    Number of children: None    Years of education: None    Highest education level: None   Occupational History    None   Social Needs    Financial resource strain: None    Food insecurity:     Worry: None     Inability: None    Transportation needs:     Medical: None     Non-medical: None   Tobacco Use    Smoking status: Former Smoker     Types: Cigarettes     Last attempt to quit: 3/20/1965     Years since quittin 3    Smokeless tobacco: Never Used   Substance and Sexual Activity    Alcohol use:  Yes     Alcohol/week: 1 0 standard drinks     Types: 1 Glasses of wine per week     Frequency: 2-3 times a week     Drinks per session: 1 or 2     Comment: social     Drug use: No    Sexual activity: None   Lifestyle    Physical activity:     Days per week: None     Minutes per session: None    Stress: None   Relationships    Social connections:     Talks on phone: None     Gets together: None     Attends Quaker service: None     Active member of club or organization: None     Attends meetings of clubs or organizations: None     Relationship status: None    Intimate partner violence:     Fear of current or ex partner: None     Emotionally abused: None     Physically abused: None     Forced sexual activity: None   Other Topics Concern    None   Social History Narrative    None       ROS:  Unobtainable due to patient's mental status  Remainder review of systems is negative    Exam:  General:  Alert and awake  Poor historian  Extremely anxious appearing  Head: Normocephalic, atraumatic  Eyes:  EOMI  Pupils - equal, round, reactive to accomodation  No icterus  Normal Conjunctiva  Oropharynx: moist and normal-appearing mucosa  Neck: supple, symmetrical, trachea midline and no JVD  Heart:  Irregular and borderline tachycardic  Systolic ejection murmur noted  Respiratory effort / Chest Inspection: unlabored  Lungs:  Fairly clear anteriorly  Abdomen: flat, normal findings: bowel sounds normal and soft, non-tender  Lower Limbs:  no pitting edema  Pulses[de-identified]  RLE - DP:  Trace                 LLE - DP:  Trace  Musculoskeletal: ROM grossly normal   Occasionally flexing muscles in somewhat spastic movement  DATA:      ECG:        Atrial fibrillation with rapid ventricular response              Telemetry: Atrial fibrillation  HR           Echocardiogram:  Pending          Ischemic Testing:  Echocardiogram pending         Weights: Wt Readings from Last 3 Encounters:   03/20/19 45 9 kg (101 lb 3 2 oz)   09/19/18 47 2 kg (104 lb)   05/14/18 46 4 kg (102 lb 6 4 oz)   , There is no height or weight on file to calculate BMI           Lab Studies:    Results from last 7 days   Lab Units 06/30/19  0038 06/29/19  1402   CK TOTAL U/L 217*  --    TROPONIN I ng/mL  --  0 03   CK MB INDEX % 2 2  --           Results from last 7 days   Lab Units 06/29/19  1402   WBC Thousand/uL 6 41   HEMOGLOBIN g/dL 13 3   HEMATOCRIT % 42 0   PLATELETS Thousands/uL 194   ,   Results from last 7 days   Lab Units 06/30/19  0435 06/29/19  1402   POTASSIUM mmol/L 4 0 4 9   CHLORIDE mmol/L 108 108   CO2 mmol/L 31 30   BUN mg/dL 16 13   CREATININE mg/dL 0 81 0 72   CALCIUM mg/dL 9 4 9 6   ALK PHOS U/L 60 67   ALT U/L 68 81*   AST U/L 51* 62*

## 2019-06-30 NOTE — ASSESSMENT & PLAN NOTE
Maintained on Lexapro daily  Recent episodes of increased panic/anxiety attacks or she becomes rigid/shaking  Check CK to evaluate for rhabdomyolysis  Having psychiatry evaluate here for recommendations  Have p r n  Low-dose Xanax available as patient has taken the past and tolerated

## 2019-06-30 NOTE — ASSESSMENT & PLAN NOTE
Initially admitted from 6/7/19-6/11/19 after sustaining a fall  Went to [facility] afterwards for PT/OT and discharged on 6/26/19  Patient re-presented today with a complaint of feeling stiff and rigid after experiencing episodes of anxiety or panic  She is unable to ambulate as a result of this and family feels unsafe with her at home  She normally ambulates with a walker at baseline and was previously living alone in an apartment with close family support nearby    Consult PT/OT

## 2019-06-30 NOTE — PLAN OF CARE
Problem: Potential for Falls  Goal: Patient will remain free of falls  Description  INTERVENTIONS:  - Assess patient frequently for physical needs  -  Identify cognitive and physical deficits and behaviors that affect risk of falls    -  [place] fall precautions as indicated by assessment   - Educate patient/family on patient safety including physical limitations  - Instruct patient to call for assistance with activity based on assessment  - Modify environment to reduce risk of injury  - Consider OT/PT consult to assist with strengthening/mobility  Outcome: Progressing     Problem: PAIN - ADULT  Goal: Verbalizes/displays adequate comfort level or baseline comfort level  Description  Interventions:  - Encourage patient to monitor pain and request assistance  - Assess pain using appropriate pain scale  - Administer analgesics based on type and severity of pain and evaluate response  - Implement non-pharmacological measures as appropriate and evaluate response  - Consider cultural and social influences on pain and pain management  - Notify physician/advanced practitioner if interventions unsuccessful or patient reports new pain  Outcome: Progressing     Problem: SAFETY ADULT  Goal: Maintain or return to baseline ADL function  Description  INTERVENTIONS:  -  Assess patient's ability to carry out ADLs; assess patient's baseline for ADL function and identify physical deficits which impact ability to perform ADLs (bathing, care of mouth/teeth, toileting, grooming, dressing, etc )  - Assess/evaluate cause of self-care deficits   - Assess range of motion  - Assess patient's mobility; develop plan if impaired  - Assess patient's need for assistive devices and provide as appropriate  - Encourage maximum independence but intervene and supervise when necessary  ¯ Involve family in performance of ADLs  ¯ Assess for home care needs following discharge   ¯ Request OT consult to assist with ADL evaluation and planning for discharge  ¯ Provide patient education as appropriate  Outcome: Progressing  Goal: Maintain or return mobility status to optimal level  Description  INTERVENTIONS:  - Assess patient's baseline mobility status (ambulation, transfers, stairs, etc )    - Identify cognitive and physical deficits and behaviors that affect mobility  - Identify mobility aids required to assist with transfers and/or ambulation (gait belt, sit-to-stand, lift, walker, cane, etc )  - [place] fall precautions as indicated by assessment  - Record patient progress and toleration of activity level on Mobility SBAR; progress patient to next Phase/Stage  - Instruct patient to call for assistance with activity based on assessment  - Request Rehabilitation consult to assist with strengthening/weightbearing, etc   Outcome: Progressing     Problem: DISCHARGE PLANNING  Goal: Discharge to home or other facility with appropriate resources  Description  INTERVENTIONS:  - Identify barriers to discharge w/patient and caregiver  - Arrange for needed discharge resources and transportation as appropriate  - Identify discharge learning needs (meds, wound care, etc )  - Arrange for interpretive services to assist at discharge as needed  - Refer to Case Management Department for coordinating discharge planning if the patient needs post-hospital services based on physician/advanced practitioner order or complex needs related to functional status, cognitive ability, or social support system  Outcome: Progressing     Problem: Knowledge Deficit  Goal: Patient/family/caregiver demonstrates understanding of disease process, treatment plan, medications, and discharge instructions  Description  Complete learning assessment and assess knowledge base    Interventions:  - Provide teaching at level of understanding  - Provide teaching via preferred learning methods  Outcome: Progressing     Problem: CARDIOVASCULAR - ADULT  Goal: Maintains optimal cardiac output and hemodynamic stability  Description  INTERVENTIONS:  - Monitor I/O, vital signs and rhythm  - Monitor for S/S and trends of decreased cardiac output i e  bleeding, hypotension  - Administer and titrate ordered vasoactive medications to optimize hemodynamic stability  - Assess quality of pulses, skin color and temperature  - Assess for signs of decreased coronary artery perfusion - ex   Angina  - Instruct patient to report change in severity of symptoms  Outcome: Progressing  Goal: Absence of cardiac dysrhythmias or at baseline rhythm  Description  INTERVENTIONS:  - Continuous cardiac monitoring, monitor vital signs, obtain 12 lead EKG if indicated  - Administer antiarrhythmic and heart rate control medications as ordered  - Monitor electrolytes and administer replacement therapy as ordered  Outcome: Progressing     Problem: MUSCULOSKELETAL - ADULT  Goal: Maintain or return mobility to safest level of function  Description  INTERVENTIONS:  - Assess patient's ability to carry out ADLs; assess patient's baseline for ADL function and identify physical deficits which impact ability to perform ADLs (bathing, care of mouth/teeth, toileting, grooming, dressing, etc )  - Assess/evaluate cause of self-care deficits   - Assess range of motion  - Assess patient's mobility; develop plan if impaired  - Assess patient's need for assistive devices and provide as appropriate  - Encourage maximum independence but intervene and supervise when necessary  - Involve family in performance of ADLs  - Assess for home care needs following discharge   - Request OT consult to assist with ADL evaluation and planning for discharge  - Provide patient education as appropriate  Outcome: Progressing  Goal: Maintain proper alignment of affected body part  Description  INTERVENTIONS:  - Support, maintain and protect limb and body alignment  - Provide pt/fam with appropriate education  Outcome: Progressing

## 2019-06-30 NOTE — PLAN OF CARE
Problem: Potential for Falls  Goal: Patient will remain free of falls  Description  INTERVENTIONS:  - Assess patient frequently for physical needs  -  Identify cognitive and physical deficits and behaviors that affect risk of falls    -  [place] fall precautions as indicated by assessment   - Educate patient/family on patient safety including physical limitations  - Instruct patient to call for assistance with activity based on assessment  - Modify environment to reduce risk of injury  - Consider OT/PT consult to assist with strengthening/mobility  Outcome: Progressing     Problem: PAIN - ADULT  Goal: Verbalizes/displays adequate comfort level or baseline comfort level  Description  Interventions:  - Encourage patient to monitor pain and request assistance  - Assess pain using appropriate pain scale  - Administer analgesics based on type and severity of pain and evaluate response  - Implement non-pharmacological measures as appropriate and evaluate response  - Consider cultural and social influences on pain and pain management  - Notify physician/advanced practitioner if interventions unsuccessful or patient reports new pain  Outcome: Progressing     Problem: SAFETY ADULT  Goal: Maintain or return to baseline ADL function  Description  INTERVENTIONS:  -  Assess patient's ability to carry out ADLs; assess patient's baseline for ADL function and identify physical deficits which impact ability to perform ADLs (bathing, care of mouth/teeth, toileting, grooming, dressing, etc )  - Assess/evaluate cause of self-care deficits   - Assess range of motion  - Assess patient's mobility; develop plan if impaired  - Assess patient's need for assistive devices and provide as appropriate  - Encourage maximum independence but intervene and supervise when necessary  ¯ Involve family in performance of ADLs  ¯ Assess for home care needs following discharge   ¯ Request OT consult to assist with ADL evaluation and planning for discharge  ¯ Provide patient education as appropriate  Outcome: Progressing  Goal: Maintain or return mobility status to optimal level  Description  INTERVENTIONS:  - Assess patient's baseline mobility status (ambulation, transfers, stairs, etc )    - Identify cognitive and physical deficits and behaviors that affect mobility  - Identify mobility aids required to assist with transfers and/or ambulation (gait belt, sit-to-stand, lift, walker, cane, etc )  - [place] fall precautions as indicated by assessment  - Record patient progress and toleration of activity level on Mobility SBAR; progress patient to next Phase/Stage  - Instruct patient to call for assistance with activity based on assessment  - Request Rehabilitation consult to assist with strengthening/weightbearing, etc   Outcome: Progressing     Problem: DISCHARGE PLANNING  Goal: Discharge to home or other facility with appropriate resources  Description  INTERVENTIONS:  - Identify barriers to discharge w/patient and caregiver  - Arrange for needed discharge resources and transportation as appropriate  - Identify discharge learning needs (meds, wound care, etc )  - Arrange for interpretive services to assist at discharge as needed  - Refer to Case Management Department for coordinating discharge planning if the patient needs post-hospital services based on physician/advanced practitioner order or complex needs related to functional status, cognitive ability, or social support system  Outcome: Progressing     Problem: Knowledge Deficit  Goal: Patient/family/caregiver demonstrates understanding of disease process, treatment plan, medications, and discharge instructions  Description  Complete learning assessment and assess knowledge base    Interventions:  - Provide teaching at level of understanding  - Provide teaching via preferred learning methods  Outcome: Progressing     Problem: CARDIOVASCULAR - ADULT  Goal: Maintains optimal cardiac output and hemodynamic stability  Description  INTERVENTIONS:  - Monitor I/O, vital signs and rhythm  - Monitor for S/S and trends of decreased cardiac output i e  bleeding, hypotension  - Administer and titrate ordered vasoactive medications to optimize hemodynamic stability  - Assess quality of pulses, skin color and temperature  - Assess for signs of decreased coronary artery perfusion - ex   Angina  - Instruct patient to report change in severity of symptoms  Outcome: Progressing  Goal: Absence of cardiac dysrhythmias or at baseline rhythm  Description  INTERVENTIONS:  - Continuous cardiac monitoring, monitor vital signs, obtain 12 lead EKG if indicated  - Administer antiarrhythmic and heart rate control medications as ordered  - Monitor electrolytes and administer replacement therapy as ordered  Outcome: Progressing     Problem: MUSCULOSKELETAL - ADULT  Goal: Maintain or return mobility to safest level of function  Description  INTERVENTIONS:  - Assess patient's ability to carry out ADLs; assess patient's baseline for ADL function and identify physical deficits which impact ability to perform ADLs (bathing, care of mouth/teeth, toileting, grooming, dressing, etc )  - Assess/evaluate cause of self-care deficits   - Assess range of motion  - Assess patient's mobility; develop plan if impaired  - Assess patient's need for assistive devices and provide as appropriate  - Encourage maximum independence but intervene and supervise when necessary  - Involve family in performance of ADLs  - Assess for home care needs following discharge   - Request OT consult to assist with ADL evaluation and planning for discharge  - Provide patient education as appropriate  Outcome: Progressing  Goal: Maintain proper alignment of affected body part  Description  INTERVENTIONS:  - Support, maintain and protect limb and body alignment  - Provide pt/fam with appropriate education  Outcome: Progressing

## 2019-06-30 NOTE — ASSESSMENT & PLAN NOTE
While seeing and examining patient, noted to be in new onset AFib  Patient with no history of AFib  Chads Vasc score for (age +4, female +1, hypertension +1)  Already maintained on 81 mg of aspirin daily- continue for now and defer anticoagulation to Cardiology  Patient's heart rate in the 110s-120s  Give IV metoprolol 5 mg now  Start on oral metoprolol tartrate 12 5 mg twice daily  Monitor on telemetry  TSH within normal limits

## 2019-06-30 NOTE — H&P
H&P- Fozia Chow 7/26/1923, 80 y o  female MRN: [id number]    Unit/Bed#: [place] [address] -01 Encounter: [id number]    Primary Care Provider: Sydney Segal DO   Date and time admitted to hospital: 6/29/2019 12:47 PM        Ambulatory dysfunction  Assessment & Plan  Initially admitted from 6/7/19-6/11/19 after sustaining a fall  Went to [facility] for PT/OT and discharged on 6/26/19  Patient re-presented today with a complaint of feeling stiff and rigid after experiencing episodes of anxiety or panic  She is unable to ambulate as a result of this and family feels unsafe with her at home  She normally ambulates with a walker at baseline and was previously living alone in an apartment with close family support nearby  Consult PT/OT    New onset a-fib [facility])  Assessment & Plan  While seeing and examining patient, noted to be in new onset AFib  Patient with no history of AFib  Chads Vasc score for (age +4, female +1, hypertension +1)  Already maintained on 81 mg of aspirin daily- continue for now and defer anticoagulation to Cardiology  Patient's heart rate in the 110s-120s  Give IV metoprolol 5 mg now  Start on oral metoprolol tartrate 12 5 mg twice daily  Monitor on telemetry  TSH within normal limits  Anxiety  Assessment & Plan  Maintained on Lexapro daily  Recent episodes of increased panic/anxiety attacks or she becomes rigid/shaking  Check CK to evaluate for rhabdomyolysis  Having psychiatry evaluate here for recommendations  Have p r n  Low-dose Xanax available as patient has taken the past and tolerated  Memory loss  Assessment & Plan  Per reports of family, pt is becoming more forgetful   Check vitamin B12 and folate    Essential hypertension  Assessment & Plan  Home regimen of lisinopril 10 mg daily     Will hold lisinopril in place of metoprolol tartrate 12 5 mg BID      VTE Prophylaxis: Enoxaparin (Lovenox)  / sequential compression device   Code Status: full code  Anticipated Length of Stay:  Patient will be admitted on an Inpatient basis with an anticipated length of stay of  Greater than 2 midnights  Justification for Hospital Stay: ambulatory dysfunction with need for STR       Chief Complaint:   Anxiety / stiffness / inability to walk     History of Present Illness:     Lisa De Guzman is a 80 y o  female with past medical history of essential hypertension, anxiety  History was obtained with the help of daughter at bedside      She presents with a complaint of having increasing anxiety/panic attacks home  Patient becomes very stiff and rigid and she is unable to ambulate and she normally would  Patient did have a recent hospitalization at [facility] from 6/7/19-6/11/19 after a fall  She then was discharged to [facility] rehab and was further discharged from there on 6/26/19  Given patient's continuing episodes of anxiety she was brought back to the hospital   Upon further questioning with family at bedside patient additionally has been feeling lightheaded and foggy headed for the past few months       Prior to her recent hospitalization and she lives at home alone in an apartment with close supportive family  Her children would take turns going over to her apartment at night and eat dinner with her  Previously she ambulated with a walker and was able to perform her ADLs  History of alcohol consumption  Former smoker      Review of Systems:     General:   No Fever or chills; +generalized weakness / anxiety   EENT:   No ear pain, facial swelling;    Skin:   No rashes, color changes  Respiratory:     No shortness of breath, cough, wheezing, stridor  Cardiovascular:     No chest pain, palpitations  Gastrointestinal:    No nausea, vomiting, diarrhea; No abdominal pain  Musculoskeletal:     No arthralgias, myalgias, swelling  Neurologic:   No dizziness, numbness, weakness  No speech difficulties     Psych:   No agitation     Otherwise, All other twelve-point review of systems normal       Past Medical and Surgical History:      Medical History        Past Medical History:   Diagnosis Date    HTN (hypertension)              Surgical History   History reviewed  No pertinent surgical history         Meds/Allergies:     Current Medications             Current Facility-Administered Medications   Medication Dose Route Frequency Provider Last Rate Last Dose    acetaminophen (TYLENOL) tablet 650 mg  650 mg Oral Q6H PRN Liliam Parsons PA-C        ALPRAZolam Jeannett Elms) tablet 0 25 mg  0 25 mg Oral 4x Daily PRN Liliam Parsons PA-C        [START ON 6/30/2019] aspirin chewable tablet 81 mg  81 mg Oral Daily Tracy Velarde PA-C        enoxaparin (LOVENOX) subcutaneous injection 30 mg  30 mg Subcutaneous Q24H [address] Tracy Velarde PA-C        [START ON 6/30/2019] escitalopram (LEXAPRO) tablet 20 mg  20 mg Oral Daily Tracy Velarde PA-C        meclizine (ANTIVERT) tablet 25 mg  25 mg Oral Q8H PRN Tracy Velarde PA-C        melatonin tablet 6 mg  6 mg Oral HS Tracy Velarde PA-C        metoprolol tartrate (LOPRESSOR) partial tablet 12 5 mg  12 5 mg Oral Q12H [address] Tracy Velarde PA-C        ondansetron (ZOFRAN) injection 4 mg  4 mg Intravenous Q6H PRN Liliam Parsons PA-C                      Allergies   Allergen Reactions    Cephalexin         Other reaction(s): thrush    Sulfa Antibiotics Other (See Comments)         Allergies: Allergies   Allergen Reactions    Cephalexin         Other reaction(s): thrush    Sulfa Antibiotics Other (See Comments)         Social History:      Marital Status:        Substance Use History:   Social History           Substance and Sexual Activity   Alcohol Use Yes    Alcohol/week: 1 0 standard drinks    Types: 1 Glasses of wine per week    Frequency: 2-3 times a week    Drinks per session: 1 or 2     Comment: social       Social History           Tobacco Use   Smoking Status Former Smoker    Types: Cigarettes    Last attempt to quit: 3/20/1965    Years since quittin 3   Smokeless Tobacco Never Used      Social History          Substance and Sexual Activity   Drug Use No         Family History:     non-contributory     Physical Exam:      Vitals:   Blood Pressure: 152/81 (19)  Pulse: (!) 111 (19)  Temperature: 97 6 °F (36 4 °C) (19)  Temp Source: Temporal (19)  Respirations: 20 (19)  SpO2: 96 % (19)     Physical Exam   Constitutional: She is oriented to person, place, and time  She appears well-developed  No distress  Frail and cachectic-appearing  Stiff   HENT:   Head: Normocephalic and atraumatic  Cardiovascular: Normal rate and regular rhythm  Murmur heard  Pulmonary/Chest: Effort normal and breath sounds normal  No stridor  No respiratory distress  She has no wheezes  She has no rales  She exhibits no tenderness  Abdominal: Soft  Bowel sounds are normal  She exhibits no distension and no mass  There is no tenderness  There is no rebound and no guarding  No hernia  Neurological: She is alert and oriented to person, place, and time  No cranial nerve deficit  Coordination normal    Equal upper motor and lower motor strength bilaterally  No sensory deficit present  Fine motor coordination skills finger to thumb and finger to nose intact  Equal plantar flexion and dorsiflexion bilaterally  Heel-to-shin intact  No pronator drift  Skin: Skin is warm and dry  She is not diaphoretic  Psychiatric: She has a normal mood and affect   Her behavior is normal    Nursing note and vitals reviewed         Additional Data:      Lab Results: I have personally reviewed pertinent reports             Results from last 7 days   Lab Units 19  1402   WBC Thousand/uL 6 41   HEMOGLOBIN g/dL 13 3   HEMATOCRIT % 42 0   PLATELETS Thousands/uL 194   NEUTROS PCT % 72   LYMPHS PCT % 18   MONOS PCT % 7   EOS PCT % 1           Results from last 7 days   Lab Units 06/29/19  1402   POTASSIUM mmol/L 4 9   CHLORIDE mmol/L 108   CO2 mmol/L 30   BUN mg/dL 13   CREATININE mg/dL 0 72   CALCIUM mg/dL 9 6   ALK PHOS U/L 67   ALT U/L 81*   AST U/L 62*             Imaging: I have personally reviewed pertinent reports        Ct Head Without Contrast     Result Date: 6/7/2019  Narrative: CT BRAIN - WITHOUT CONTRAST INDICATION:   off balance, recent fall  fell yesterday;  ambulatory difficulty COMPARISON:  6/6/2019 TECHNIQUE:  CT examination of the brain was performed  In addition to axial images, coronal 2D reformatted images were created and submitted for interpretation  Radiation dose length product (DLP) for this visit:  865 mGy-cm   This examination, like all CT scans performed in the [facility], was performed utilizing techniques to minimize radiation dose exposure, including the use of iterative reconstruction and automated exposure control  IMAGE QUALITY:  Diagnostic  FINDINGS: PARENCHYMA: Decreased attenuation is noted in periventricular and subcortical white matter demonstrating an appearance that is statistically most likely to represent mild microangiopathic change  Atherosclerotic calcifications of the cavernous segment of  the internal carotid artery are moderate  No CT signs of acute infarction  No intracranial mass, mass effect or midline shift  No acute parenchymal hemorrhage  VENTRICLES AND EXTRA-AXIAL SPACES:  Normal for the patient's age  VISUALIZED ORBITS AND PARANASAL SINUSES:  No acute abnormality involving the orbits  Mild scattered sinus mucosal thickening is noted  No fluid levels are seen  Bilateral lens implants noted  CALVARIUM AND EXTRACRANIAL SOFT TISSUES:  Normal       Impression: No acute intracranial abnormality  Microangiopathic changes  Workstation performed: [id number]      Ct Head Without Contrast     Result Date: 6/6/2019  Narrative: CT BRAIN - WITHOUT CONTRAST INDICATION:   Fall  Posterior head injury  Laceration  COMPARISON:  None  TECHNIQUE:  CT examination of the brain was performed  In addition to axial images, coronal 2D reformatted images were created and submitted for interpretation  Radiation dose length product (DLP) for this visit:  947 mGy-cm   This examination, like all CT scans performed in the [facility], was performed utilizing techniques to minimize radiation dose exposure, including the use of iterative reconstruction and automated exposure control  IMAGE QUALITY:  Diagnostic  FINDINGS: PARENCHYMA: Decreased attenuation is noted in periventricular and subcortical white matter demonstrating an appearance that is statistically most likely to represent moderate microangiopathic change  No CT signs of acute infarction  No intracranial mass, mass effect or midline shift  No acute parenchymal hemorrhage  VENTRICLES AND EXTRA-AXIAL SPACES:  Normal for the patient's age  VISUALIZED ORBITS AND PARANASAL SINUSES:  Unremarkable  CALVARIUM AND EXTRACRANIAL SOFT TISSUES:  Normal       Impression: No acute intracranial abnormality  Workstation performed: [id number]      Ct Cervical Spine Without Contrast     Result Date: 6/6/2019  Narrative: CT CERVICAL SPINE - WITHOUT CONTRAST INDICATION:   fall  COMPARISON:  None  TECHNIQUE:  CT examination of the cervical spine was performed without intravenous contrast   Contiguous axial images were obtained  Sagittal and coronal reconstructions were performed  Radiation dose length product (DLP) for this visit:  193 mGy-cm   This examination, like all CT scans performed in the [facility], was performed utilizing techniques to minimize radiation dose exposure, including the use of iterative reconstruction and automated exposure control  IMAGE QUALITY:  Diagnostic  FINDINGS: ALIGNMENT:  No compression deformity or traumatic malalignment  Degenerative grade 1 anterolisthesis of C3 relative to C4 and C7 relative to T1   VERTEBRAL BODIES:  No fracture  No destructive osseous lesion  DEGENERATIVE CHANGES:  Severe multilevel cervical degenerative changes are noted  No critical central canal stenosis  PREVERTEBRAL AND PARASPINAL SOFT TISSUES:  More advanced atherosclerotic vascular calcification than would be expected for the patient's age is noted at the carotid bifurcations bilaterally  THORACIC INLET:  Normal       Impression: No cervical spine fracture or traumatic malalignment  Advanced multilevel cervical degenerative changes  Workstation performed: [id number]         EKG, Pathology, and Other Studies Reviewed on Admission:   · EK AFib     Allscripts Records Reviewed: Yes      Total Time for Visit, including Counseling / Coordination of Care: 45 minutes  Greater than 50% of this total time spent on direct patient counseling and coordination of care         ** Please Note: This note has been constructed using a voice recognition system   **

## 2019-06-30 NOTE — ASSESSMENT & PLAN NOTE
Initially admitted from 6/7/19-6/11/19 after sustaining a fall  Went to [facility] afterwards for PT/OT and discharged on 6/26/19  Patient re-presented today with a complaint of feeling stiff and rigid after experiencing episodes of anxiety or panic  She is unable to ambulate as a result of this and family feels unsafe with her at home  She normally ambulates with a walker at baseline and was previously living alone in an apartment with close family support nearby    Consult PT/OT

## 2019-06-30 NOTE — PROGRESS NOTES
Progress Note - Yoni Yates 7/26/1923, 80 y o  female MRN: [id number]    Unit/Bed#: [place] [address] -01 Encounter: [id number]    Primary Care Provider: Phineas Kanner, DO   Date and time admitted to hospital: 6/29/2019 12:47 PM        * Ambulatory dysfunction  Assessment & Plan  Initially admitted from 6/7/19-6/11/19 after sustaining a fall  Went to [facility] for PT/OT and discharged on 6/26/19  Patient re-presented today with a complaint of feeling stiff and rigid after experiencing episodes of anxiety or panic  She is unable to ambulate as a result of this and family feels unsafe with her at home  She normally ambulates with a walker at baseline and was previously living alone in an apartment with close family support nearby  Consult PT/OT    Anxiety  Assessment & Plan    Recent episodes of increased panic/anxiety attacks or she becomes rigid/shaking, patient reports feeling very scared/terrified  Patient treated Valium 2 5 mg today with improvement in anxiety/panic attack  CPK is mildly elevated, follow up repeat labs tomorrow will start IV fluid  Having psychiatry evaluate here for recommendations, consider Neurology evaluation  Valium 2 5 mg IV q 8 hours ordered  Hypernatremia  Assessment & Plan  Patient with hyponatremia, mild free water deficit likely due to poor p o  Intake  Will give half normal saline, follow up a m  Labs  New onset a-fib [facility])  Assessment & Plan  New diagnosis of AFib this admission    Patient with no history of AFib  Chads Vasc score for (age +4, female +1, hypertension +1)  Already maintained on 81 mg of aspirin daily- continue rate control  Started on oral metoprolol tartrate 12 5 mg twice daily  Monitor on telemetry  TSH within normal limits      Echo pending    Murmur, cardiac  Assessment & Plan  Check echo    Memory loss  Assessment & Plan  Per reports of family, pt is becoming more forgetful     B12/ folate both elevated    Essential hypertension  Assessment & Plan  Home regimen of lisinopril 10 mg daily  Will hold lisinopril in place of metoprolol tartrate 12 5 mg BID        VTE Pharmacologic Prophylaxis:   Pharmacologic: Enoxaparin (Lovenox)  Mechanical VTE Prophylaxis in Place: Yes    Patient Centered Rounds: I have performed bedside rounds with nursing staff today  Education and Discussions with Family / Patient:  Plan of care discussed with multiple children at bedside    Time Spent for Care: 45 minutes  More than 50% of total time spent on counseling and coordination of care as described above  Current Length of Stay: 1 day(s)    Current Patient Status: Inpatient   Certification Statement: The patient will continue to require additional inpatient hospital stay due to Continued symptoms, diffuse rigidity, severe anxiety  Patient not able to safely ambulate or self-care      Code Status: Level 1 - Full Code      Subjective:   Patient reports abnormal sense of motion, she feels as though she is going to fall over even when she is lying in bed, patient is terrified  Objective:     Vitals:   Temp (24hrs), Av 4 °F (36 3 °C), Min:97 °F (36 1 °C), Max:97 7 °F (36 5 °C)    Temp:  [97 °F (36 1 °C)-97 7 °F (36 5 °C)] 97 7 °F (36 5 °C)  HR:  [] 81  Resp:  [18-20] 18  BP: (115-152)/() 115/60  SpO2:  [95 %-98 %] 95 %  There is no height or weight on file to calculate BMI  Input and Output Summary (last 24 hours): Intake/Output Summary (Last 24 hours) at 2019 1859  Last data filed at 2019 1635  Gross per 24 hour   Intake 300 ml   Output 200 ml   Net 100 ml       Physical Exam:     Physical Exam   Constitutional: She is oriented to person, place, and time  No distress  Cachectic/frail elderly appearing female, patient appears visibly anxious   Cardiovascular: Normal rate, regular rhythm, normal heart sounds and intact distal pulses     Pulmonary/Chest: Effort normal and breath sounds normal  No stridor  No respiratory distress  Abdominal: Soft  Bowel sounds are normal  She exhibits no distension  There is no tenderness  Musculoskeletal: Normal range of motion  Neurological: She is alert and oriented to person, place, and time  No cranial nerve deficit  She exhibits abnormal muscle tone (During patient's shaking episodes she is awake and alert, is able answer questions, she has increased muscle rigidity, no cogwheeling)  Coordination normal    Skin: Skin is warm and dry  She is not diaphoretic  No erythema  No pallor  Nursing note and vitals reviewed  Additional Data:     Labs:    Results from last 7 days   Lab Units 06/29/19  1402   WBC Thousand/uL 6 41   HEMOGLOBIN g/dL 13 3   HEMATOCRIT % 42 0   PLATELETS Thousands/uL 194   NEUTROS PCT % 72   LYMPHS PCT % 18   MONOS PCT % 7   EOS PCT % 1     Results from last 7 days   Lab Units 06/30/19  0435   POTASSIUM mmol/L 4 0   CHLORIDE mmol/L 108   CO2 mmol/L 31   BUN mg/dL 16   CREATININE mg/dL 0 81   CALCIUM mg/dL 9 4   ALK PHOS U/L 60   ALT U/L 68   AST U/L 51*           * I Have Reviewed All Lab Data Listed Above  * Additional Pertinent Lab Tests Reviewed:  Madan 66 Admission Reviewed    Imaging:    Imaging Reports Reviewed Today Include:  CT of the head report was reviewed, no acute intracranial abnormality        Recent Cultures (last 7 days):           Last 24 Hours Medication List:     Current Facility-Administered Medications:  acetaminophen 650 mg Oral Q6H PRN ANUSHKA Jessica-IDRIS   ALPRAZolam 0 25 mg Oral 4x Daily PRN ANUSHKA Uribe-C   aspirin 81 mg Oral Daily Tracy Velarde, PA-C   diazepam 2 5 mg Intravenous Q8H PRN Juan Hay DO   enoxaparin 30 mg Subcutaneous Q24H [address] Tracykris Velarde, PA-C   escitalopram 20 mg Oral Daily Tracy Velarde, PA-C   meclizine 25 mg Oral Q8H PRN Tracy Velarde, PA-C   melatonin 6 mg Oral HS Tracy Velarde, PA-C   metoprolol tartrate 12 5 mg Oral Q12H [address] [facility], [place]   ondansetron 4 mg Intravenous Q6H PRN Navarro Stringer PA-C        Today, Patient Was Seen By: Anila Ruff DO

## 2019-06-30 NOTE — ASSESSMENT & PLAN NOTE
New diagnosis of AFib this admission    Patient with no history of AFib  Chads Vasc score for (age +4, female +1, hypertension +1)  Already maintained on 81 mg of aspirin daily- continue rate control  Started on oral metoprolol tartrate 12 5 mg twice daily  Monitor on telemetry  TSH within normal limits      Echo pending

## 2019-06-30 NOTE — ASSESSMENT & PLAN NOTE
Initially admitted from 6/7/19-6/11/19 after sustaining a fall  Went to [facility] afterwards for PT/OT and discharged on 6/26/19  Patient re-presented today with a complaint of feeling stiff and rigid after experiencing episodes of anxiety or panic  She is unable to ambulate as a result of this and family feels unsafe with her at home  She normally ambulates with a walker at baseline and was previously living alone in an apartment with close family support nearby

## 2019-06-30 NOTE — ASSESSMENT & PLAN NOTE
Recent episodes of increased panic/anxiety attacks or she becomes rigid/shaking, patient reports feeling very scared/terrified  Patient treated Valium 2 5 mg today with improvement in anxiety/panic attack  CPK is mildly elevated, follow up repeat labs tomorrow will start IV fluid  Having psychiatry evaluate here for recommendations, consider Neurology evaluation  Valium 2 5 mg IV q 8 hours ordered

## 2019-06-30 NOTE — PROGRESS NOTES
Progress Note - Pb Bass 7/26/1923, 80 y o  female MRN: [id number]    Unit/Bed#: [place] [address] -01 Encounter: [id number]    Primary Care Provider: Rosanne Jenkins DO   Date and time admitted to hospital: 6/29/2019 12:47 PM        Ambulatory dysfunction  Assessment & Plan  Initially admitted from 6/7/19-6/11/19 after sustaining a fall  Went to [facility] for PT/OT and discharged on 6/26/19  Patient re-presented today with a complaint of feeling stiff and rigid after experiencing episodes of anxiety or panic  She is unable to ambulate as a result of this and family feels unsafe with her at home  She normally ambulates with a walker at baseline and was previously living alone in an apartment with close family support nearby  Consult PT/OT    New onset a-fib [facility])  Assessment & Plan  While seeing and examining patient, noted to be in new onset AFib  Patient with no history of AFib  Chads Vasc score for (age +4, female +1, hypertension +1)  Already maintained on 81 mg of aspirin daily- continue for now and defer anticoagulation to Cardiology  Patient's heart rate in the 110s-120s  Give IV metoprolol 5 mg now  Start on oral metoprolol tartrate 12 5 mg twice daily  Monitor on telemetry  TSH within normal limits  Anxiety  Assessment & Plan  Maintained on Lexapro daily  Recent episodes of increased panic/anxiety attacks or she becomes rigid/shaking  Check CK to evaluate for rhabdomyolysis  Having psychiatry evaluate here for recommendations  Have p r n  Low-dose Xanax available as patient has taken the past and tolerated  Memory loss  Assessment & Plan  Per reports of family, pt is becoming more forgetful   Check vitamin B12 and folate    Essential hypertension  Assessment & Plan  Home regimen of lisinopril 10 mg daily     Will hold lisinopril in place of metoprolol tartrate 12 5 mg BID      VTE Prophylaxis: Enoxaparin (Lovenox)  / sequential compression device   Code Status: full code  Anticipated Length of Stay:  Patient will be admitted on an Inpatient basis with an anticipated length of stay of  Greater than 2 midnights  Justification for Hospital Stay: ambulatory dysfunction with need for STR  Chief Complaint:   Anxiety / stiffness / inability to walk    History of Present Illness:    Kaila Paul is a 80 y o  female with past medical history of essential hypertension, anxiety  History was obtained with the help of daughter at bedside  She presents with a complaint of having increasing anxiety/panic attacks home  Patient becomes very stiff and rigid and she is unable to ambulate and she normally would  Patient did have a recent hospitalization at [facility] from 6/7/19-6/11/19 after a fall  She then was discharged to [facility] rehab and was further discharged from there on 6/26/19  Given patient's continuing episodes of anxiety she was brought back to the hospital   Upon further questioning with family at bedside patient additionally has been feeling lightheaded and foggy headed for the past few months  Prior to her recent hospitalization and she lives at home alone in an apartment with close supportive family  Her children would take turns going over to her apartment at night and eat dinner with her  Previously she ambulated with a walker and was able to perform her ADLs  History of alcohol consumption  Former smoker  Review of Systems:    General:   No Fever or chills; +generalized weakness / anxiety   EENT:   No ear pain, facial swelling;    Skin:   No rashes, color changes  Respiratory:     No shortness of breath, cough, wheezing, stridor  Cardiovascular:     No chest pain, palpitations  Gastrointestinal:    No nausea, vomiting, diarrhea; No abdominal pain  Musculoskeletal:     No arthralgias, myalgias, swelling  Neurologic:   No dizziness, numbness, weakness  No speech difficulties     Psych:   No agitation    Otherwise, All other twelve-point review of systems normal      Past Medical and Surgical History:     Past Medical History:   Diagnosis Date    HTN (hypertension)        History reviewed  No pertinent surgical history  Meds/Allergies:    Current Facility-Administered Medications   Medication Dose Route Frequency Provider Last Rate Last Dose    acetaminophen (TYLENOL) tablet 650 mg  650 mg Oral Q6H PRN Liliam Parsons PA-C        ALPRAZolam Jeannett Elms) tablet 0 25 mg  0 25 mg Oral 4x Daily PRN Liliam Parsons PA-C        [START ON 2019] aspirin chewable tablet 81 mg  81 mg Oral Daily Tracy Velarde PA-C        enoxaparin (LOVENOX) subcutaneous injection 30 mg  30 mg Subcutaneous Q24H [address] Tracy Velarde PA-C        [START ON 2019] escitalopram (LEXAPRO) tablet 20 mg  20 mg Oral Daily Tracy Velarde PA-C        meclizine (ANTIVERT) tablet 25 mg  25 mg Oral Q8H PRN Tracy Velarde PA-C        melatonin tablet 6 mg  6 mg Oral HS Tracy Velarde PA-C        metoprolol tartrate (LOPRESSOR) partial tablet 12 5 mg  12 5 mg Oral Q12H [address] Tracy Velarde PA-C        ondansetron (ZOFRAN) injection 4 mg  4 mg Intravenous Q6H PRN Tracy Velarde PA-C           Allergies   Allergen Reactions    Cephalexin      Other reaction(s): thrush    Sulfa Antibiotics Other (See Comments)       Allergies: Allergies   Allergen Reactions    Cephalexin      Other reaction(s): thrush    Sulfa Antibiotics Other (See Comments)       Social History:      Marital Status:       Substance Use History:   Social History     Substance and Sexual Activity   Alcohol Use Yes    Alcohol/week: 1 0 standard drinks    Types: 1 Glasses of wine per week    Frequency: 2-3 times a week    Drinks per session: 1 or 2    Comment: social      Social History     Tobacco Use   Smoking Status Former Smoker    Types: Cigarettes    Last attempt to quit: 3/20/1965    Years since quittin 3   Smokeless Tobacco Never Used     Social History     Substance and Sexual Activity   Drug Use No       Family History:    non-contributory    Physical Exam:     Vitals:   Blood Pressure: 152/81 (06/29/19 1938)  Pulse: (!) 111 (06/29/19 1938)  Temperature: 97 6 °F (36 4 °C) (06/29/19 1938)  Temp Source: Temporal (06/29/19 1938)  Respirations: 20 (06/29/19 1938)  SpO2: 96 % (06/29/19 1938)    Physical Exam   Constitutional: She is oriented to person, place, and time  She appears well-developed  No distress  Frail and cachectic-appearing  Stiff   HENT:   Head: Normocephalic and atraumatic  Cardiovascular: Normal rate and regular rhythm  Murmur heard  Pulmonary/Chest: Effort normal and breath sounds normal  No stridor  No respiratory distress  She has no wheezes  She has no rales  She exhibits no tenderness  Abdominal: Soft  Bowel sounds are normal  She exhibits no distension and no mass  There is no tenderness  There is no rebound and no guarding  No hernia  Neurological: She is alert and oriented to person, place, and time  No cranial nerve deficit  Coordination normal    Equal upper motor and lower motor strength bilaterally  No sensory deficit present  Fine motor coordination skills finger to thumb and finger to nose intact  Equal plantar flexion and dorsiflexion bilaterally  Heel-to-shin intact  No pronator drift  Skin: Skin is warm and dry  She is not diaphoretic  Psychiatric: She has a normal mood and affect  Her behavior is normal    Nursing note and vitals reviewed  Additional Data:     Lab Results: I have personally reviewed pertinent reports        Results from last 7 days   Lab Units 06/29/19  1402   WBC Thousand/uL 6 41   HEMOGLOBIN g/dL 13 3   HEMATOCRIT % 42 0   PLATELETS Thousands/uL 194   NEUTROS PCT % 72   LYMPHS PCT % 18   MONOS PCT % 7   EOS PCT % 1     Results from last 7 days   Lab Units 06/29/19  1402   POTASSIUM mmol/L 4 9   CHLORIDE mmol/L 108   CO2 mmol/L 30   BUN mg/dL 13   CREATININE mg/dL 0 72   CALCIUM mg/dL 9 6   ALK PHOS U/L 67   ALT U/L 81*   AST U/L 62* Imaging: I have personally reviewed pertinent reports  Ct Head Without Contrast    Result Date: 6/7/2019  Narrative: CT BRAIN - WITHOUT CONTRAST INDICATION:   off balance, recent fall  fell yesterday;  ambulatory difficulty COMPARISON:  6/6/2019 TECHNIQUE:  CT examination of the brain was performed  In addition to axial images, coronal 2D reformatted images were created and submitted for interpretation  Radiation dose length product (DLP) for this visit:  865 mGy-cm   This examination, like all CT scans performed in the [facility], was performed utilizing techniques to minimize radiation dose exposure, including the use of iterative reconstruction and automated exposure control  IMAGE QUALITY:  Diagnostic  FINDINGS: PARENCHYMA: Decreased attenuation is noted in periventricular and subcortical white matter demonstrating an appearance that is statistically most likely to represent mild microangiopathic change  Atherosclerotic calcifications of the cavernous segment of  the internal carotid artery are moderate  No CT signs of acute infarction  No intracranial mass, mass effect or midline shift  No acute parenchymal hemorrhage  VENTRICLES AND EXTRA-AXIAL SPACES:  Normal for the patient's age  VISUALIZED ORBITS AND PARANASAL SINUSES:  No acute abnormality involving the orbits  Mild scattered sinus mucosal thickening is noted  No fluid levels are seen  Bilateral lens implants noted  CALVARIUM AND EXTRACRANIAL SOFT TISSUES:  Normal      Impression: No acute intracranial abnormality  Microangiopathic changes  Workstation performed: [id number]     Ct Head Without Contrast    Result Date: 6/6/2019  Narrative: CT BRAIN - WITHOUT CONTRAST INDICATION:   Fall  Posterior head injury  Laceration  COMPARISON:  None  TECHNIQUE:  CT examination of the brain was performed  In addition to axial images, coronal 2D reformatted images were created and submitted for interpretation    Radiation dose length product (DLP) for this visit:  947 mGy-cm   This examination, like all CT scans performed in the [facility], was performed utilizing techniques to minimize radiation dose exposure, including the use of iterative reconstruction and automated exposure control  IMAGE QUALITY:  Diagnostic  FINDINGS: PARENCHYMA: Decreased attenuation is noted in periventricular and subcortical white matter demonstrating an appearance that is statistically most likely to represent moderate microangiopathic change  No CT signs of acute infarction  No intracranial mass, mass effect or midline shift  No acute parenchymal hemorrhage  VENTRICLES AND EXTRA-AXIAL SPACES:  Normal for the patient's age  VISUALIZED ORBITS AND PARANASAL SINUSES:  Unremarkable  CALVARIUM AND EXTRACRANIAL SOFT TISSUES:  Normal      Impression: No acute intracranial abnormality  Workstation performed: [id number]     Ct Cervical Spine Without Contrast    Result Date: 6/6/2019  Narrative: CT CERVICAL SPINE - WITHOUT CONTRAST INDICATION:   fall  COMPARISON:  None  TECHNIQUE:  CT examination of the cervical spine was performed without intravenous contrast   Contiguous axial images were obtained  Sagittal and coronal reconstructions were performed  Radiation dose length product (DLP) for this visit:  193 mGy-cm   This examination, like all CT scans performed in the [facility], was performed utilizing techniques to minimize radiation dose exposure, including the use of iterative reconstruction and automated exposure control  IMAGE QUALITY:  Diagnostic  FINDINGS: ALIGNMENT:  No compression deformity or traumatic malalignment  Degenerative grade 1 anterolisthesis of C3 relative to C4 and C7 relative to T1  VERTEBRAL BODIES:  No fracture  No destructive osseous lesion  DEGENERATIVE CHANGES:  Severe multilevel cervical degenerative changes are noted  No critical central canal stenosis   PREVERTEBRAL AND PARASPINAL SOFT TISSUES:  More advanced atherosclerotic vascular calcification than would be expected for the patient's age is noted at the carotid bifurcations bilaterally  THORACIC INLET:  Normal      Impression: No cervical spine fracture or traumatic malalignment  Advanced multilevel cervical degenerative changes  Workstation performed: [id number]       EKG, Pathology, and Other Studies Reviewed on Admission:   · EK AFib    Allscripts Records Reviewed: Yes     Total Time for Visit, including Counseling / Coordination of Care: 45 minutes  Greater than 50% of this total time spent on direct patient counseling and coordination of care  ** Please Note: This note has been constructed using a voice recognition system   **

## 2019-06-30 NOTE — ASSESSMENT & PLAN NOTE
Patient with hyponatremia, mild free water deficit likely due to poor p o  Intake  Will give half normal saline, follow up a m  Labs

## 2019-06-30 NOTE — UTILIZATION REVIEW
Initial Clinical Review    Admission: Date/Time/Statement: 6/29/19 @ [facility] This Encounter   Procedures    Inpatient Admission (expected length of stay for this patient Order details is greater than two midnights)     Standing Status:   Standing     Number of Occurrences:   1     Order Specific Question:   Admitting Physician     Answer:   Alek Park     Order Specific Question:   Level of Care     Answer:   Med Surg [[id number]]     Order Specific Question:   Estimated length of stay     Answer:   More than 2 Midnights     Order Specific Question:   Certification     Answer:   I certify that inpatient services are medically necessary for this patient for a duration of greater than two midnights  See H&P and MD Progress Notes for additional information about the patient's course of treatment  ED Arrival Information     Expected Arrival Acuity Means of Arrival Escorted By Service Admission Type    - 6/29/2019 12:47 Urgent Ambulance [facility] Hospitalist Urgent    Arrival Complaint    -        Chief Complaint   Patient presents with    Tremors     Pt sent in from home that she shares with her family because of increased tremors, and difficulty walking  Assessment/Plan:   Kiley Ma a 80 y  o  female with past medical history of essential hypertension, anxiety   History was obtained with the help of daughter at bedside     She presents with a complaint of having increasing anxiety/panic attacks home   Patient becomes very stiff and rigid and she is unable to ambulate and she normally would   Patient did have a recent hospitalization at [address] from 6/7/19-6/11/19 after a fall   She then was discharged to [facility] rehab and was further discharged from there on 6/26/19  Claire Carpenter patient's continuing episodes of anxiety she was brought back to the hospital  Shama Barley further questioning with family at bedside patient additionally has been feeling lightheaded and foggy headed for the past few months  Prior to her recent hospitalization and she lives at home alone in an apartment with close supportive family  [facility] children would take turns going over to her apartment at night and eat dinner with her   Previously she ambulated with a walker and was able to perform her ADLs   History of alcohol consumption   Former smoker  Ambulatory dysfunction  Assessment & Plan  Initially admitted from 6/7/19-6/11/19 after sustaining a fall  Went to [facility] for PT/OT and discharged on 6/26/19  Patient re-presented today with a complaint of feeling stiff and rigid after experiencing episodes of anxiety or panic  She is unable to ambulate as a result of this and family feels unsafe with her at home  She normally ambulates with a walker at baseline and was previously living alone in an apartment with close family support nearby  Consult PT/OT     New onset a-fib [facility])  Assessment & Plan  While seeing and examining patient, noted to be in new onset AFib  Patient with no history of AFib  Chads Vasc score for (age +4, female +1, hypertension +1)  Already maintained on 81 mg of aspirin daily- continue for now and defer anticoagulation to Cardiology  Patient's heart rate in the 110s-120s  Give IV metoprolol 5 mg now  Start on oral metoprolol tartrate 12 5 mg twice daily  Monitor on telemetry  TSH within normal limits      Anxiety  Assessment & Plan  Maintained on Lexapro daily  Recent episodes of increased panic/anxiety attacks or she becomes rigid/shaking  Check CK to evaluate for rhabdomyolysis  Having psychiatry evaluate here for recommendations  Have p r n  Low-dose Xanax available as patient has taken the past and tolerated      Memory loss  Assessment & Plan  Per reports of family, pt is becoming more forgetful   Check vitamin B12 and folate     Essential hypertension  Assessment & Plan  Home regimen of lisinopril 10 mg daily     Will hold lisinopril in place of metoprolol tartrate 12 5 mg BID      VTE Prophylaxis: Enoxaparin (Lovenox)  / sequential compression device   Code Status: full code  Anticipated Length of Stay:  Patient will be admitted on an Inpatient basis with an anticipated length of stay of  Greater than 2 midnights  Justification for Hospital Stay: ambulatory dysfunction with need for STR        ED Triage Vitals [06/29/19 1252]   Temperature Pulse Respirations Blood Pressure SpO2   97 7 °F (36 5 °C) 105 16 141/87 96 %      Temp Source Heart Rate Source Patient Position - Orthostatic VS BP Location FiO2 (%)   Oral Monitor Lying Right arm --      Pain Score       No Pain        Wt Readings from Last 1 Encounters:   03/20/19 45 9 kg (101 lb 3 2 oz)     Additional Vital Signs:   06/30/19 0746  97 3 °F (36 3 °C)Abnormal   68  20  142/86  98 %  None (Room air)   06/29/19 2243  97 °F (36 1 °C)Abnormal   94  20  138/101Abnormal   98 %  None (Room air)   06/29/19 2130    86    142/97       06/29/19 1938  97 6 °F (36 4 °C)  111Abnormal   20  152/81  96 %  None (Room air)   06/29/19 1810    96  18  140/87  96 %  None (Room air)   06/29/19 1454    114Abnormal   16  173/120Abnormal   97 %  None (Room air)     Pertinent Labs/Diagnostic Test Results:     6/30 CT HEAD - PENDING RESULTS     6/29 ECG - ATRIAL FIB - Patient with previous PACs on last EKG, EKG reading A-fib, however upon discussion with Dr Saul, felt to be irregular secondary to ectopy / PACs    Results from last 7 days   Lab Units 06/29/19  1402   WBC Thousand/uL 6 41   HEMOGLOBIN g/dL 13 3   HEMATOCRIT % 42 0   PLATELETS Thousands/uL 194   NEUTROS ABS Thousands/µL 4 69     Results from last 7 days   Lab Units 06/30/19  0435 06/29/19  1402   SODIUM mmol/L 146* 144   POTASSIUM mmol/L 4 0 4 9   CHLORIDE mmol/L 108 108   CO2 mmol/L 31 30   ANION GAP mmol/L 7 6   BUN mg/dL 16 13   CREATININE mg/dL 0 81 0 72   EGFR ml/min/1 73sq m 62 71   CALCIUM mg/dL 9 4 9 6   MAGNESIUM mg/dL  --  2 0   PHOSPHORUS mg/dL  --  3 5     Results from last 7 days   Lab Units 06/30/19  0435 06/29/19  1402   AST U/L 51* 62*   ALT U/L 68 81*   ALK PHOS U/L 60 67   TOTAL PROTEIN g/dL 5 7* 6 3*   ALBUMIN g/dL 2 8* 3 1*   TOTAL BILIRUBIN mg/dL 0 48 0 59     Results from last 7 days   Lab Units 06/30/19  0435 06/29/19  1402   GLUCOSE RANDOM mg/dL 92 96     Results from last 7 days   Lab Units 06/30/19  0038   CK TOTAL U/L 217*   CK MB INDEX % 2 2   CK MB ng/mL 4 8     Results from last 7 days   Lab Units 06/29/19  1402   TROPONIN I ng/mL 0 03     Results from last 7 days   Lab Units 06/29/19  1434   CLARITY UA  Clear   COLOR UA  Yellow   SPEC GRAV UA  1 015   PH UA  7 5   GLUCOSE UA mg/dl Negative   KETONES UA mg/dl Negative   BLOOD UA  Trace*   PROTEIN UA mg/dl Negative   NITRITE UA  Negative   BILIRUBIN UA  Negative   UROBILINOGEN UA E U /dl 0 2   LEUKOCYTES UA  Negative   WBC UA /hpf 0-1*   RBC UA /hpf 2-4*   BACTERIA UA /hpf Occasional   EPITHELIAL CELLS WET PREP /hpf Occasional     ED Treatment:   Medication Administration from 06/29/2019 1247 to 06/29/2019 1949     None        Past Medical History:   Diagnosis Date    HTN (hypertension)      Present on Admission:   Ambulatory dysfunction   Memory loss   Essential hypertension   New onset a-fib ([facility] [address] )   Anxiety    Admitting Diagnosis:  Anxiety [F41 9]  Panic attack [F41 0]  New onset a-fib [facility]) [I48 91]  Ambulatory dysfunction [R26 2]     Age/Sex: 80 y o  female     Admission Orders:    Current Facility-Administered Medications:  acetaminophen 650 mg Oral Q6H PRN X1 6/29   ALPRAZolam 0 25 mg Oral 4x Daily PRN X1 6/29, 6/30   aspirin 81 mg Oral Daily    diazepam 2 5 mg Intravenous Q8H PRN    enoxaparin 30 mg Subcutaneous Q24H SEAN    escitalopram 20 mg Oral Daily    meclizine 25 mg Oral Q8H PRN X2 6/30   melatonin 6 mg Oral HS    metoprolol tartrate 12 5 mg Oral Q12H SEAN    ondansetron 4 mg Intravenous Q6H PRN      TELE  SCDs  UP W/ ASSIST   CT HEAD   AMMONIA  CARDIAC DIET   PT/OT EVAL/TX   IP CONSULT TO ED CRISIS WORKER  IP CONSULT TO CARDIOLOGY  IP CONSULT TO PSYCHIATRY  IP CONSULT TO CASE MANAGEMENT    Network Utilization Review Department  Phone: [phone]; Fax [phone]  [email or web address]  ATTENTION: Please call with any questions or concerns to [phone]  and carefully listen to the prompts so that you are directed to the right person  Send all requests for admission clinical reviews, approved or denied determinations and any other requests to fax [phone]   All voicemails are confidential

## 2019-06-30 NOTE — ASSESSMENT & PLAN NOTE
Home regimen of lisinopril 10 mg daily     Will hold lisinopril in place of metoprolol tartrate 12 5 mg BID

## 2019-07-01 ENCOUNTER — APPOINTMENT (INPATIENT)
Dept: NON INVASIVE DIAGNOSTICS | Facility: HOSPITAL | Age: 84
DRG: 091 | End: 2019-07-01
Payer: MEDICARE

## 2019-07-01 LAB
ALBUMIN SERPL BCP-MCNC: 2.8 G/DL (ref 3.5–5)
ALP SERPL-CCNC: 64 U/L (ref 46–116)
ALT SERPL W P-5'-P-CCNC: 111 U/L (ref 12–78)
AMMONIA PLAS-SCNC: <10 UMOL/L (ref 11–35)
ANION GAP SERPL CALCULATED.3IONS-SCNC: 5 MMOL/L (ref 4–13)
AST SERPL W P-5'-P-CCNC: 85 U/L (ref 5–45)
ATRIAL RATE: 119 BPM
BASOPHILS # BLD AUTO: 0.02 THOUSANDS/ΜL (ref 0–0.1)
BASOPHILS NFR BLD AUTO: 0 % (ref 0–1)
BILIRUB SERPL-MCNC: 0.47 MG/DL (ref 0.2–1)
BUN SERPL-MCNC: 17 MG/DL (ref 5–25)
CALCIUM SERPL-MCNC: 9.1 MG/DL (ref 8.3–10.1)
CHLORIDE SERPL-SCNC: 107 MMOL/L (ref 100–108)
CK MB SERPL-MCNC: 2.5 % (ref 0–2.5)
CK MB SERPL-MCNC: 8 NG/ML (ref 0–5)
CK SERPL-CCNC: 326 U/L (ref 26–192)
CO2 SERPL-SCNC: 30 MMOL/L (ref 21–32)
CREAT SERPL-MCNC: 0.84 MG/DL (ref 0.6–1.3)
EOSINOPHIL # BLD AUTO: 0.05 THOUSAND/ΜL (ref 0–0.61)
EOSINOPHIL NFR BLD AUTO: 1 % (ref 0–6)
ERYTHROCYTE [DISTWIDTH] IN BLOOD BY AUTOMATED COUNT: 13.5 % (ref 11.6–15.1)
GFR SERPL CREATININE-BSD FRML MDRD: 59 ML/MIN/1.73SQ M
GLUCOSE SERPL-MCNC: 101 MG/DL (ref 65–140)
HCT VFR BLD AUTO: 38.6 % (ref 34.8–46.1)
HGB BLD-MCNC: 12.2 G/DL (ref 11.5–15.4)
IMM GRANULOCYTES # BLD AUTO: 0.02 THOUSAND/UL (ref 0–0.2)
IMM GRANULOCYTES NFR BLD AUTO: 0 % (ref 0–2)
INR PPP: 1.1 (ref 0.84–1.19)
LYMPHOCYTES # BLD AUTO: 0.78 THOUSANDS/ΜL (ref 0.6–4.47)
LYMPHOCYTES NFR BLD AUTO: 15 % (ref 14–44)
MAGNESIUM SERPL-MCNC: 1.9 MG/DL (ref 1.6–2.6)
MCH RBC QN AUTO: 31.4 PG (ref 26.8–34.3)
MCHC RBC AUTO-ENTMCNC: 31.6 G/DL (ref 31.4–37.4)
MCV RBC AUTO: 99 FL (ref 82–98)
MONOCYTES # BLD AUTO: 0.39 THOUSAND/ΜL (ref 0.17–1.22)
MONOCYTES NFR BLD AUTO: 7 % (ref 4–12)
NEUTROPHILS # BLD AUTO: 4.11 THOUSANDS/ΜL (ref 1.85–7.62)
NEUTS SEG NFR BLD AUTO: 77 % (ref 43–75)
NRBC BLD AUTO-RTO: 0 /100 WBCS
PHOSPHATE SERPL-MCNC: 2.8 MG/DL (ref 2.3–4.1)
PLATELET # BLD AUTO: 183 THOUSANDS/UL (ref 149–390)
PMV BLD AUTO: 10 FL (ref 8.9–12.7)
POTASSIUM SERPL-SCNC: 4.1 MMOL/L (ref 3.5–5.3)
PROT SERPL-MCNC: 5.7 G/DL (ref 6.4–8.2)
PROTHROMBIN TIME: 14.3 SECONDS (ref 11.6–14.5)
QRS AXIS: 68 DEGREES
QRSD INTERVAL: 70 MS
QT INTERVAL: 310 MS
QTC INTERVAL: 397 MS
RBC # BLD AUTO: 3.89 MILLION/UL (ref 3.81–5.12)
SODIUM SERPL-SCNC: 142 MMOL/L (ref 136–145)
T WAVE AXIS: 10 DEGREES
VENTRICULAR RATE: 99 BPM
WBC # BLD AUTO: 5.37 THOUSAND/UL (ref 4.31–10.16)

## 2019-07-01 PROCEDURE — 80053 COMPREHEN METABOLIC PANEL: CPT | Performed by: INTERNAL MEDICINE

## 2019-07-01 PROCEDURE — 84100 ASSAY OF PHOSPHORUS: CPT | Performed by: INTERNAL MEDICINE

## 2019-07-01 PROCEDURE — 83735 ASSAY OF MAGNESIUM: CPT | Performed by: INTERNAL MEDICINE

## 2019-07-01 PROCEDURE — 85025 COMPLETE CBC W/AUTO DIFF WBC: CPT | Performed by: INTERNAL MEDICINE

## 2019-07-01 PROCEDURE — 93010 ELECTROCARDIOGRAM REPORT: CPT | Performed by: INTERNAL MEDICINE

## 2019-07-01 PROCEDURE — 82553 CREATINE MB FRACTION: CPT | Performed by: INTERNAL MEDICINE

## 2019-07-01 PROCEDURE — 99232 SBSQ HOSP IP/OBS MODERATE 35: CPT | Performed by: INTERNAL MEDICINE

## 2019-07-01 PROCEDURE — 99222 1ST HOSP IP/OBS MODERATE 55: CPT | Performed by: NURSE PRACTITIONER

## 2019-07-01 PROCEDURE — 82550 ASSAY OF CK (CPK): CPT | Performed by: INTERNAL MEDICINE

## 2019-07-01 PROCEDURE — 93306 TTE W/DOPPLER COMPLETE: CPT

## 2019-07-01 PROCEDURE — 99232 SBSQ HOSP IP/OBS MODERATE 35: CPT | Performed by: HOSPITALIST

## 2019-07-01 PROCEDURE — 85610 PROTHROMBIN TIME: CPT | Performed by: INTERNAL MEDICINE

## 2019-07-01 PROCEDURE — 82140 ASSAY OF AMMONIA: CPT | Performed by: INTERNAL MEDICINE

## 2019-07-01 PROCEDURE — 93306 TTE W/DOPPLER COMPLETE: CPT | Performed by: INTERNAL MEDICINE

## 2019-07-01 RX ORDER — DIPHENHYDRAMINE HYDROCHLORIDE 50 MG/ML
25 INJECTION INTRAMUSCULAR; INTRAVENOUS ONCE
Status: COMPLETED | OUTPATIENT
Start: 2019-07-01 | End: 2019-07-01

## 2019-07-01 RX ORDER — OLANZAPINE 5 MG/1
2.5 TABLET ORAL
Status: DISCONTINUED | OUTPATIENT
Start: 2019-07-01 | End: 2019-07-03

## 2019-07-01 RX ADMIN — OLANZAPINE 2.5 MG: 5 TABLET, FILM COATED ORAL at 22:24

## 2019-07-01 RX ADMIN — ALPRAZOLAM 0.25 MG: 0.25 TABLET ORAL at 20:07

## 2019-07-01 RX ADMIN — METOPROLOL TARTRATE 12.5 MG: 25 TABLET ORAL at 08:59

## 2019-07-01 RX ADMIN — MECLIZINE HYDROCHLORIDE 25 MG: 25 TABLET ORAL at 08:59

## 2019-07-01 RX ADMIN — ALPRAZOLAM 0.25 MG: 0.25 TABLET ORAL at 08:59

## 2019-07-01 RX ADMIN — MELATONIN TAB 3 MG 6 MG: 3 TAB at 20:08

## 2019-07-01 RX ADMIN — DIPHENHYDRAMINE HYDROCHLORIDE 25 MG: 50 INJECTION, SOLUTION INTRAMUSCULAR; INTRAVENOUS at 00:56

## 2019-07-01 RX ADMIN — ESCITALOPRAM OXALATE 20 MG: 10 TABLET ORAL at 08:59

## 2019-07-01 RX ADMIN — ENOXAPARIN SODIUM 30 MG: 30 INJECTION SUBCUTANEOUS at 09:00

## 2019-07-01 RX ADMIN — METOPROLOL TARTRATE 25 MG: 25 TABLET, FILM COATED ORAL at 20:07

## 2019-07-01 RX ADMIN — ASPIRIN 81 MG 81 MG: 81 TABLET ORAL at 08:59

## 2019-07-01 RX ADMIN — DIAZEPAM 2.5 MG: 5 INJECTION, SOLUTION INTRAMUSCULAR; INTRAVENOUS at 04:17

## 2019-07-01 NOTE — CONSULTS
Consultation - Tamia Brandon [age] y o  female MRN: [id number]  Unit/Bed#: [facility] Encounter: [id number]    Assessment/Plan    Unspecified neurocognitive disorder (R41 9)  Generalized anxiety disorder (F41 1)    Assessment:  Dewey Puente is a 59-year-old female with a history of anxiety who presented to the emergency department after becoming increasingly anxious and having frequent panic attacks at home  Patient was recently hospitalized from [id number] after a fall at home where she received sutures and staples in the back of her head  Patient then was discharged to [facility] rehab facility where she was discharged on 06/26/2019  Upon returning home, patient became increasingly anxious  Has per her daughter, every time patient needed to get up and walk with her walker she would become increasingly anxious and had panic attacks  Patient would also get increasingly anxious when she had to use the bathroom as this would mean she would have to walk  Upon psychiatric assessment, patient was soft-spoken with a constricted affect and would wax and wane between answering my questions appropriately and having loose associations  Patient was lying in bed and was having delusions of falling as she would grab a hold of the bed rails and begin to yell help me, help me and help me him falling   Patient at times would become mildly agitated when this writer would ask her questions and appeared to be somewhat confused  Has per patient's daughter, patient has been more forgetful in recent months than in the past and they began to leave notes around the house for her in order for her to remember things  Patient was unable to rate her depression but moderate anxiety was noted  Patient did deny any suicidal/homicidal ideation or auditory/visual hallucinations and she does not appear to be responding to internal stimuli at this time  Her concentration appeared to be shorter than expected    Staff reports patient is sleeping throughout the night and has adequate appetite and reduced energy level  As per patient's daughter who was at bedside during the encounter, patient has been treated for anxiety for several years and has been only taking Lexapro  Xanax was started as needed since in hospital and as per patient's daughter, it does not seem to be effective  Patient's daughter reports her anxiety and panic attacks worsened after her most recent fall  Patient did see Dr Gary Douglass who stated to patient's daughter that her memory issues with decreased concentration and forgetfulness could be the onset of dementia  Patient's daughter did state patient's maternal great grandmother was diagnosed with dementia  Patient currently lives by herself, however, her children do  I frequently throughout the day to assist her with ADLs  As per her daughter, it has become increasingly difficult to take care of patient since she is having increased anxiety and less confidence in standing up and walking  Patient's daughter would like to have her mother back home, however, she is understanding that may not be possible  Of note, patient's increased anxiety and panic may be a result of some symptoms PTSD after her recent fall resulting in a severe laceration to the back of her scalp requiring several sutures and staples  Patient has been displaying increased anxiety and panic more frequently since her fall  It is difficult to assess at this time as patient is very limited in her responses  Recommendation:  Although patient presents as somewhat confused and anxious, patient does not appear to be a danger to self or others at this time  Admit to inpatient behavioral health is not necessary at this time  Patient's children would like to have patient back home in their care if possible but do understand that may not be an option going forward    Patient may benefit more from a skilled nursing facility with a dementia unit and patient's daughter stated she would be okay with this if necessary  Plan:     1  Patient may benefit from a skilled nursing facility with a dementia unit  2  Zyprexa 2 5 mg, HS started to assist with delusions of falling and mild agitation  3  Will continue with Xanax 0 25 mg 4 times daily as needed, as previously ordered, for increased anxiety and panic  Did explain to patient's daughter that medication will only be given if asked for and if she noticed an increase in anxiety in her mother to ask nurse for medications  Medications side effects were discussed with both patient and patient's daughter includin  Lexapro - serotonin syndrome, SIADH, worsening depression, suicidality, induction of brian, GI upset, headaches, activation, sexual side effects, sedation, potential drug interactions, and others  2  Xanax - risks of sedation, respiratory depression and potential for abuse and addiction related to treatment with benzodiazepine medications  3  Zyprexa - weight gain, increased appetite, headache, dizziness, drowsiness, dry mouth nausea/vomiting/constipation    Patient's daughter verbalized understanding  Risks, benefits and possible side effects of Medications:   Patient does not verbalize understanding at this time and will require further explanation  Chief Complaint:  Increased anxiety and panic attacks    Per H&P: Janna Mckeon is a 89 y  o  female with past medical history of essential hypertension, anxiety   History was obtained with the help of daughter at bedside      She presents with a complaint of having increasing anxiety/panic attacks home   Patient becomes very stiff and rigid and she is unable to ambulate and she normally would   Patient did have a recent hospitalization at [facility] from 19-19 after a fall   She then was discharged to [facility] rehab and was further discharged from there on 19  Sukhdev Perla patient's continuing episodes of anxiety she was brought back to the hospital  Leonila Estrada further questioning with family at bedside patient additionally has been feeling lightheaded and foggy headed for the past few months       Prior to her recent hospitalization and she lives at home alone in an apartment with close supportive family  LOREN SAHU  [facility] children would take turns going over to her apartment at night and eat dinner with her   Previously she ambulated with a walker and was able to perform her ADLs   History of alcohol consumption   Former smoker  History of Present Illness   Physician Requesting Consult: Sage Renee DO  Reason for Consult / Principal Problem:  Increased anxiety and panic attacks after recent fall  Primary complaints include: agitation, anxiety, anxiety attacks, fearfulness and increased irritability  Onset of symptoms was abrupt starting several days ago with unchanged course since that time  Psychosocial Stressors: health  Inpatient consult to Psychiatry  Consult performed by: RANJANA Calvert  Consult ordered by: [employer]      Psychiatric Review Of Systems:  sleep: no  appetite changes: no  weight changes: no  energy/anergy: yes, decreased  interest/pleasure/anhedonia: no  somatic symptoms:  Feelings of falling  anxiety/panic: yes, increased  brian: no  guilty/hopeless: no  self injurious behavior/risky behavior: no    Historical Information   Past Psychiatric History:   None  Currently in treatment with: not applicable  Past Suicide attempts:  Denies  Past Violent behavior:  Denies  Past Psychiatric medication trial:  Lexapro, Xanax    Substance Abuse History:  Denies  Use of Alcohol: occasional, social use how many drinks per day 1 glass of wine and how often 2-3 times per week    Longest clean time:  Not applicable  History of IP/OP rehabilitation program:  Not applicable  Smoking history:   Former smoker (quit 54 years ago)  Use of Caffeine: Coffee    Family Psychiatric History:   Psychiatric Illness Maternal great grandmother Illness: Dementia    Social History  Education: high school diploma/GED  Learning Disabilities: Denies  Marital history:   Living arrangement, social support: The patient lives in home with By herself, however, family stops in multiple times a day to assist her with ADLs  Occupational History: retired  Functioning Relationships: good support system  Other Pertinent History: None    Traumatic History:   Abuse: Denies  Other Traumatic Events: Recent full where she hit the back of her head and receive sutures and staples    Past Medical History:   Diagnosis Date    HTN (hypertension)        Medical Review Of Systems:  Review of Systems   Psychiatric/Behavioral: Positive for agitation, confusion and decreased concentration  Negative for behavioral problems, dysphoric mood, hallucinations, self-injury, sleep disturbance and suicidal ideas  The patient is nervous/anxious  The patient is not hyperactive          Meds/Allergies   all current active meds have been reviewed and current meds:   Current Facility-Administered Medications   Medication Dose Route Frequency    acetaminophen (TYLENOL) tablet 650 mg  650 mg Oral Q6H PRN    ALPRAZolam (XANAX) tablet 0 25 mg  0 25 mg Oral 4x Daily PRN    aspirin chewable tablet 81 mg  81 mg Oral Daily    diazepam (VALIUM) injection 2 5 mg  2 5 mg Intravenous Q8H PRN    enoxaparin (LOVENOX) subcutaneous injection 30 mg  30 mg Subcutaneous Q24H SEAN    escitalopram (LEXAPRO) tablet 20 mg  20 mg Oral Daily    meclizine (ANTIVERT) tablet 25 mg  25 mg Oral Q8H PRN    melatonin tablet 6 mg  6 mg Oral HS    metoprolol tartrate (LOPRESSOR) tablet 25 mg  25 mg Oral Q12H SEAN    OLANZapine (ZyPREXA) tablet 2 5 mg  2 5 mg Oral HS    ondansetron (ZOFRAN) injection 4 mg  4 mg Intravenous Q6H PRN     Allergies   Allergen Reactions    Cephalexin      Other reaction(s): thrush    Sulfa Antibiotics Other (See Comments)       Objective     Vital signs in last 24 hours:  Vitals:    06/30/19 1553 06/30/19 2107 06/30/19 2218 07/01/19 0807   BP: 115/60 143/83 111/100 155/95   TempSrc: Temporal  Temporal Temporal   Pulse: 81 91 80 76   Resp: 18  20 18   Patient Position - Orthostatic VS: Lying  Lying Lying   Temp: 97 7 °F (36 5 °C)  (!) 97 3 °F (36 3 °C) 97 8 °F (36 6 °C)         Intake/Output Summary (Last 24 hours) at 7/1/2019 1335  Last data filed at 7/1/2019 0428  Gross per 24 hour   Intake 0 ml   Output 438 ml   Net -438 ml       Mental Status Evaluation:  Appearance:  age appropriate and thin & gaunt looking   Behavior:  normal   Speech:  soft   Mood:  anxious   Affect:  constricted   Language: repeating phrases   Thought Process:  loose associations   Thought Content:  delusions  somatic and Feels as though she is falling   Perceptual Disturbances: None   Risk Potential: Suicidal Ideations none   Sensorium:  person, place, situation and year   Cognition:  impaired due to Possible onset of dementia   Consciousness:  awake    Attention: attention span appeared shorter than expected for age   Intellect: decreased   Fund of Knowledge: awareness of current events: Aware she is in a hospital   Insight:  limited   Judgment: limited   Muscle Strength and Tone: Not examined   Gait/Station: Not observed   Motor Activity: no abnormal movements     Lab results:   I have personally reviewed all pertinent laboratory/tests results    Labs in last 72 hours:   Recent Labs     06/29/19  1402  07/01/19  0535   WBC 6 41  --  5 37   RBC 4 23  --  3 89   HGB 13 3  --  12 2   HCT 42 0  --  38 6     --  183   RDW 13 7  --  13 5   NEUTROABS 4 69  --  4 11   SODIUM 144   < > 142   K 4 9   < > 4 1      < > 107   CO2 30   < > 30   BUN 13   < > 17   CREATININE 0 72   < > 0 84   GLUC 96   < > 101   CALCIUM 9 6   < > 9 1   AST 62*   < > 85*   ALT 81*   < > 111*   ALKPHOS 67   < > 64   TP 6 3*   < > 5 7*   ALB 3 1*   < > 2 8*   TBILI 0 59   < > 0 47   AMMONIA  --   --  <10* [id number] 0 763  --   --     < > = values in this interval not displayed       Admission Labs:   Admission on 06/29/2019   Component Date Value    WBC 06/29/2019 6 41     RBC 06/29/2019 4 23     Hemoglobin 06/29/2019 13 3     Hematocrit 06/29/2019 42 0     MCV 06/29/2019 99*    MCH 06/29/2019 31 4     MCHC 06/29/2019 31 7     RDW 06/29/2019 13 7     MPV 06/29/2019 9 5     Platelets 06/55/8858 194     nRBC 06/29/2019 0     Neutrophils Relative 06/29/2019 72     Immat GRANS % 06/29/2019 1     Lymphocytes Relative 06/29/2019 18     Monocytes Relative 06/29/2019 7     Eosinophils Relative 06/29/2019 1     Basophils Relative 06/29/2019 1     Neutrophils Absolute 06/29/2019 4 69     Immature Grans Absolute 06/29/2019 0 03     Lymphocytes Absolute 06/29/2019 1 15     Monocytes Absolute 06/29/2019 0 47     Eosinophils Absolute 06/29/2019 0 04     Basophils Absolute 06/29/2019 0 03     Sodium 06/29/2019 144     Potassium 06/29/2019 4 9     Chloride 06/29/2019 108     CO2 06/29/2019 30     ANION GAP 06/29/2019 6     BUN 06/29/2019 13     Creatinine 06/29/2019 0 72     Glucose 06/29/2019 96     Calcium 06/29/2019 9 6     AST 06/29/2019 62*    ALT 06/29/2019 81*    Alkaline Phosphatase 06/29/2019 67     Total Protein 06/29/2019 6 3*    Albumin 06/29/2019 3 1*    Total Bilirubin 06/29/2019 0 59     eGFR 06/29/2019 71     Phosphorus 06/29/2019 3 5     Magnesium 06/29/2019 2 0     Troponin I 06/29/2019 0 03     Color, UA 06/29/2019 Yellow     Clarity, UA 06/29/2019 Clear     pH, UA 06/29/2019 7 5     Leukocytes, UA 06/29/2019 Negative     Nitrite, UA 06/29/2019 Negative     Protein, UA 06/29/2019 Negative     Glucose, UA 06/29/2019 Negative     Ketones, UA 06/29/2019 Negative     Urobilinogen, UA 06/29/2019 0 2     Bilirubin, UA 06/29/2019 Negative     Blood, UA 06/29/2019 Trace*    Specific S[place], UA 06/29/2019 1 015     RBC, UA 06/29/2019 2-4*    WBC, UA 06/29/2019 0-1*    Epithelial Cells 06/29/2019 Occasional     Bacteria, UA 06/29/2019 Occasional     TSH 3RD GENERATON 06/29/2019 0 763     Total CK 06/30/2019 217*    Folate 06/30/2019 >20 0*    Vitamin B-12 06/30/2019 1,106*    CK-MB Index 06/30/2019 2 2     CK-MB 06/30/2019 4 8     Sodium 06/30/2019 146*    Potassium 06/30/2019 4 0     Chloride 06/30/2019 108     CO2 06/30/2019 31     ANION GAP 06/30/2019 7     BUN 06/30/2019 16     Creatinine 06/30/2019 0 81     Glucose 06/30/2019 92     Calcium 06/30/2019 9 4     AST 06/30/2019 51*    ALT 06/30/2019 68     Alkaline Phosphatase 06/30/2019 60     Total Protein 06/30/2019 5 7*    Albumin 06/30/2019 2 8*    Total Bilirubin 06/30/2019 0 48     eGFR 06/30/2019 62     Ammonia 07/01/2019 <10*    Sodium 07/01/2019 142     Potassium 07/01/2019 4 1     Chloride 07/01/2019 107     CO2 07/01/2019 30     ANION GAP 07/01/2019 5     BUN 07/01/2019 17     Creatinine 07/01/2019 0 84     Glucose 07/01/2019 101     Calcium 07/01/2019 9 1     AST 07/01/2019 85*    ALT 07/01/2019 111*    Alkaline Phosphatase 07/01/2019 64     Total Protein 07/01/2019 5 7*    Albumin 07/01/2019 2 8*    Total Bilirubin 07/01/2019 0 47     eGFR 07/01/2019 59     Magnesium 07/01/2019 1 9     Phosphorus 07/01/2019 2 8     Protime 07/01/2019 14 3     INR 07/01/2019 1 10     WBC 07/01/2019 5 37     RBC 07/01/2019 3 89     Hemoglobin 07/01/2019 12 2     Hematocrit 07/01/2019 38 6     MCV 07/01/2019 99*    MCH 07/01/2019 31 4     MCHC 07/01/2019 31 6     RDW 07/01/2019 13 5     MPV 07/01/2019 10 0     Platelets 23/33/9844 183     nRBC 07/01/2019 0     Neutrophils Relative 07/01/2019 77*    Immat GRANS % 07/01/2019 0     Lymphocytes Relative 07/01/2019 15     Monocytes Relative 07/01/2019 7     Eosinophils Relative 07/01/2019 1     Basophils Relative 07/01/2019 0     Neutrophils Absolute 07/01/2019 4 11     Immature Grans Absolute 07/01/2019 0 02     Lymphocytes Absolute 07/01/2019 0 78     Monocytes Absolute 07/01/2019 0 39     Eosinophils Absolute 07/01/2019 0 05     Basophils Absolute 07/01/2019 0 02     Total CK 07/01/2019 326*    CK-MB Index 07/01/2019 2 5     CK-MB 07/01/2019 8 0*    Ventricular Rate 06/29/2019 99     Atrial Rate 06/29/2019 119     QRSD Interval 06/29/2019 70     QT Interval 06/29/2019 310     QTC Interval 06/29/2019 397     QRS Axis 06/29/2019 68     T Wave Axis 06/29/2019 10      CBC:   Lab Results   Component Value Date    WBC 5 37 07/01/2019    RBC 3 89 07/01/2019    HGB 12 2 07/01/2019    HCT 38 6 07/01/2019    MCV 99 (H) 07/01/2019     07/01/2019    MCH 31 4 07/01/2019    MCHC 31 6 07/01/2019    RDW 13 5 07/01/2019    MPV 10 0 07/01/2019    NRBC 0 07/01/2019    NEUTROABS 4 11 07/01/2019     BMP:   Lab Results   Component Value Date    SODIUM 142 07/01/2019    K 4 1 07/01/2019     07/01/2019    CO2 30 07/01/2019    AGAP 5 07/01/2019    BUN 17 07/01/2019    CREATININE 0 84 07/01/2019    GLUC 101 07/01/2019    CALCIUM 9 1 07/01/2019    EGFR 59 07/01/2019     CMP:   Lab Results   Component Value Date    SODIUM 142 07/01/2019    K 4 1 07/01/2019     07/01/2019    CO2 30 07/01/2019    AGAP 5 07/01/2019    BUN 17 07/01/2019    CREATININE 0 84 07/01/2019    GLUC 101 07/01/2019    CALCIUM 9 1 07/01/2019    AST 85 (H) 07/01/2019     (H) 07/01/2019    ALKPHOS 64 07/01/2019    TP 5 7 (L) 07/01/2019    ALB 2 8 (L) 07/01/2019    TBILI 0 47 07/01/2019    EGFR 59 07/01/2019     Lipid Profile: No results found for: CHOLESTEROL, HDL, TRIG, LDLCALC, NONHDLC  Liver Enzymes:   Lab Results   Component Value Date    AST 85 (H) 07/01/2019     (H) 07/01/2019    ALKPHOS 64 07/01/2019    TP 5 7 (L) 07/01/2019    ALB 2 8 (L) 07/01/2019    TBILI 0 47 07/01/2019     Thyroid Studies:   Lab Results   Component Value Date    [id number] 0 763 06/29/2019     RPR: No results found for: RPR  Drug Screen: No results found for: Daune Fat, BDZUR, THCUR, COCAINEUR, METHADONEUR, OPIATEUR, PCPUR, ECSTASYUR  Acetaminophen/Salicylate level No results found for: ACTMNPHEN, SALICYLATE  Vitamin D Level No results found for: [id number], SVLX085KPTP  Vitamin B12   Lab Results   Component Value Date    [id number] 1,106 (H) 06/30/2019     Iron Study No results found for: RETIC, RETICCTPCT, FERRITIN, CONCFE, TIBC, PRIMIDONE, FOLATEHEMO, IRON  Folate   Lab Results   Component Value Date    FOLATE >20 0 (H) 06/30/2019     Magnesium   Lab Results   Component Value Date    MG 1 9 07/01/2019     Phosphorus   Lab Results   Component Value Date    PHOS 2 8 07/01/2019     Potassium   Lab Results   Component Value Date    K 4 1 07/01/2019     Fasting glucose No results found for: GLUF  Urinalysis   Lab Results   Component Value Date    COLORU Yellow 06/29/2019    CLARITYU Clear 06/29/2019    SPECGRAV 1 015 06/29/2019    PHUR 7 5 06/29/2019    LEUKOCYTESUR Negative 06/29/2019    NITRITE Negative 06/29/2019    PROTEIN UA Negative 06/29/2019    GLUCOSEU Negative 06/29/2019    KETONESU Negative 06/29/2019    UROBILINOGEN 0 2 06/29/2019    BILIRUBINUR Negative 06/29/2019    BLOODU Trace (A) 06/29/2019    RBCUA 2-4 (A) 06/29/2019    WBCUA 0-1 (A) 06/29/2019    EPIS Occasional 06/29/2019    BACTERIA Occasional 06/29/2019     EKG   Lab Results   Component Value Date    VENTRATE 99 06/29/2019    ATRIALRATE 119 06/29/2019    PRINT 204 06/07/2019    QRSDINT 70 06/29/2019    QTINT 310 06/29/2019    QTCINT 397 06/29/2019    PAXIS 77 06/07/2019    QRSAXIS 68 06/29/2019    TWAVEAXIS 10 06/29/2019     Imaging Studies: Ct Head Wo Contrast    Result Date: 6/30/2019  Narrative: CT BRAIN - WITHOUT CONTRAST INDICATION:   Recent fall and tremors  COMPARISON:  6/7/2019  TECHNIQUE:  CT examination of the brain was performed  In addition to axial images, coronal 2D reformatted images were created and submitted for interpretation    Radiation dose length product (DLP) for this visit:  1031 mGy-cm   This examination, like all CT scans performed in the [facility], was performed utilizing techniques to minimize radiation dose exposure, including the use of iterative reconstruction and automated exposure control  IMAGE QUALITY:  Diagnostic  FINDINGS: PARENCHYMA:  Periventricular and subcortical hypoattenuating foci consistent with stable microangiopathic disease  No evidence of acute vascular territorial infarction  No acute intracranial hemorrhage or mass effect  VENTRICLES AND EXTRA-AXIAL SPACES:  Prominence of the ventricles and sulci consistent with stable volume loss  No hydrocephalus or extra-axial collection  VISUALIZED ORBITS AND PARANASAL SINUSES:  Intact globes  No retrobulbar hematoma  No significant paranasal sinus disease  CALVARIUM AND EXTRACRANIAL SOFT TISSUES:  No acute calvarial fracture, lytic or blastic lesion  Impression: No acute intracranial abnormality  Workstation performed: [id number]     Ct Head Without Contrast    Result Date: 6/7/2019  Narrative: CT BRAIN - WITHOUT CONTRAST INDICATION:   off balance, recent fall  fell yesterday;  ambulatory difficulty COMPARISON:  6/6/2019 TECHNIQUE:  CT examination of the brain was performed  In addition to axial images, coronal 2D reformatted images were created and submitted for interpretation  Radiation dose length product (DLP) for this visit:  865 mGy-cm   This examination, like all CT scans performed in the [facility], was performed utilizing techniques to minimize radiation dose exposure, including the use of iterative reconstruction and automated exposure control  IMAGE QUALITY:  Diagnostic  FINDINGS: PARENCHYMA: Decreased attenuation is noted in periventricular and subcortical white matter demonstrating an appearance that is statistically most likely to represent mild microangiopathic change   Atherosclerotic calcifications of the cavernous segment of  the internal carotid artery are moderate  No CT signs of acute infarction  No intracranial mass, mass effect or midline shift  No acute parenchymal hemorrhage  VENTRICLES AND EXTRA-AXIAL SPACES:  Normal for the patient's age  VISUALIZED ORBITS AND PARANASAL SINUSES:  No acute abnormality involving the orbits  Mild scattered sinus mucosal thickening is noted  No fluid levels are seen  Bilateral lens implants noted  CALVARIUM AND EXTRACRANIAL SOFT TISSUES:  Normal      Impression: No acute intracranial abnormality  Microangiopathic changes  Workstation performed: [id number]     Ct Head Without Contrast    Result Date: 6/6/2019  Narrative: CT BRAIN - WITHOUT CONTRAST INDICATION:   Fall  Posterior head injury  Laceration  COMPARISON:  None  TECHNIQUE:  CT examination of the brain was performed  In addition to axial images, coronal 2D reformatted images were created and submitted for interpretation  Radiation dose length product (DLP) for this visit:  947 mGy-cm   This examination, like all CT scans performed in the [facility], was performed utilizing techniques to minimize radiation dose exposure, including the use of iterative reconstruction and automated exposure control  IMAGE QUALITY:  Diagnostic  FINDINGS: PARENCHYMA: Decreased attenuation is noted in periventricular and subcortical white matter demonstrating an appearance that is statistically most likely to represent moderate microangiopathic change  No CT signs of acute infarction  No intracranial mass, mass effect or midline shift  No acute parenchymal hemorrhage  VENTRICLES AND EXTRA-AXIAL SPACES:  Normal for the patient's age  VISUALIZED ORBITS AND PARANASAL SINUSES:  Unremarkable  CALVARIUM AND EXTRACRANIAL SOFT TISSUES:  Normal      Impression: No acute intracranial abnormality   Workstation performed: [id number]     Ct Cervical Spine Without Contrast    Result Date: 6/6/2019  Narrative: CT CERVICAL SPINE - WITHOUT CONTRAST INDICATION: fall  COMPARISON:  None  TECHNIQUE:  CT examination of the cervical spine was performed without intravenous contrast   Contiguous axial images were obtained  Sagittal and coronal reconstructions were performed  Radiation dose length product (DLP) for this visit:  193 mGy-cm   This examination, like all CT scans performed in the [facility], was performed utilizing techniques to minimize radiation dose exposure, including the use of iterative reconstruction and automated exposure control  IMAGE QUALITY:  Diagnostic  FINDINGS: ALIGNMENT:  No compression deformity or traumatic malalignment  Degenerative grade 1 anterolisthesis of C3 relative to C4 and C7 relative to T1  VERTEBRAL BODIES:  No fracture  No destructive osseous lesion  DEGENERATIVE CHANGES:  Severe multilevel cervical degenerative changes are noted  No critical central canal stenosis  PREVERTEBRAL AND PARASPINAL SOFT TISSUES:  More advanced atherosclerotic vascular calcification than would be expected for the patient's age is noted at the carotid bifurcations bilaterally  THORACIC INLET:  Normal      Impression: No cervical spine fracture or traumatic malalignment  Advanced multilevel cervical degenerative changes  Workstation performed: [id number]   Recent Imaging Studies: Procedure: Ct Head Wo Contrast    Result Date: 6/30/2019  Narrative: CT BRAIN - WITHOUT CONTRAST INDICATION:   Recent fall and tremors  COMPARISON:  6/7/2019  TECHNIQUE:  CT examination of the brain was performed  In addition to axial images, coronal 2D reformatted images were created and submitted for interpretation  Radiation dose length product (DLP) for this visit:  1031 mGy-cm   This examination, like all CT scans performed in the [facility], was performed utilizing techniques to minimize radiation dose exposure, including the use of iterative reconstruction and automated exposure control  IMAGE QUALITY:  Diagnostic   FINDINGS: PARENCHYMA:  Periventricular and subcortical hypoattenuating foci consistent with stable microangiopathic disease  No evidence of acute vascular territorial infarction  No acute intracranial hemorrhage or mass effect  VENTRICLES AND EXTRA-AXIAL SPACES:  Prominence of the ventricles and sulci consistent with stable volume loss  No hydrocephalus or extra-axial collection  VISUALIZED ORBITS AND PARANASAL SINUSES:  Intact globes  No retrobulbar hematoma  No significant paranasal sinus disease  CALVARIUM AND EXTRACRANIAL SOFT TISSUES:  No acute calvarial fracture, lytic or blastic lesion  Impression: No acute intracranial abnormality  Workstation performed: [id number]     Code Status: Level 1 - Full Code    CASE MANAGEMENT INFORMATION (for CM use only):    1  Living arrangements:  Lives alone, however, children visit frequently throughout the day to assist with ADLs  2  Can patient return home:  Yes, but a skilled nursing facility may be more appropriate  3  Access to firearms:  No  4  Type of work:  Retired  11  Full time/part time:  Not applicable  6  Highest level of education:  High school  7  Status of admission:  Not applicable  8  [place] residence:  [place] [address]  Presenting problem:  Increased anxiety and panic attacks  10  Treatment history:  Has been treated with Lexapro for several years from her primary care provider  11  Currently in treatment:  No outpatient currently  12  Name of ICM/Agency:  Not applicable  13  Legal issues:  None  14  Substance Abuse:  No  15  Orientation level:  Alert and oriented to person and place  16  Affect:  Mood congruent  17  Speech:  Soft  18  Mood:  Anxious  19  Thought content:  Delusions of falling  20  A/Vs:  No  21  Judgement:  Impaired  22  Impulse control:  Impaired  23  Attention span:  [place] than expected age  25  Memory:  Decreased  25  Appetite:  Good  26  Wt changes:  No  27  Sleep:  Good  28  Appearance:  Thin, frail  29   Strengths:  Good supports, relatively good physical health for age, family time  27  Limitations:  Limited insight and judgment, increased confusion  31  SI/His:  No  32  Previous self harm:  No  33  Hx of violence:  No  34  Sexually acting out:  No  35  Do you feel safe:  Yes  36  Hx of abuse:  No  37  Hx of PTSD:  Possible PTSD due to recent fall        38  Provisional Dx: Unspecified neurocognitive disorder (R41 9)   Generalized anxiety disorder (F41 1)    39  Patient Plan:    1  Patient may benefit from a skilled nursing facility  with a  dementia unit  2  Zyprexa 2 5 mg, HS started to assist with delusions of falling and  mild agitation  3  Will continue with Xanax 0 25 mg 4 times daily as needed, as  previously ordered, for increased anxiety and panic  Did explain to  patient's daughter that medication will only be given if asked for and  if she noticed an increase in anxiety in her mother to ask nurse for  medications  Portions of the record may have been created with voice recognition software  Occasional wrong word or "sound a like" substitutions may have occurred due to the inherent limitations of voice recognition software  Read the chart carefully and recognize, using context, where substitutions have occurred

## 2019-07-01 NOTE — ASSESSMENT & PLAN NOTE
Has had a few falls recently  Now paralyzingly anxious  Stiffens up and cannot move at times  Psych to see  Will need placement

## 2019-07-01 NOTE — SOCIAL WORK
LOS: 2 GMLOS: 3  PATIENT IS A READMISSION  NOT A BUNDLE    Pt is known to CM department  She was assessed just a few weeks ago, on 6/10/19, at which time it was reported to CM: "49-year-old female presenting for evaluation of gait disturbance   Patient lives alone  Patient was alert and oriented times 3  Patient informs this worker family  members come to the house every day to check in    Pt is alone at night   Patient typically ambulates with a walker   She had a fall  On 06- and was evaluated at [facility],  posterior scalp laceration was repaired and CT head and C-spine were negative   Patient was discharged home and was able to ambulate normally with her walker and had no issues until this afternoon   Son/daughter report that patient was unable to stand and was very off balance, leaning to her right, despite the help of both of them they were unable to get her up to ambulate  Patient lives on the second floor apartment, planning to instal a chair lyft  Patient uses a walker   Patient identify her daugrther Courtney John as emergency coontact person ,  Patient's PCP is / Geoff Vega and uses [facility] [employer] order  Patient was evaluated by PT/OT recommendations for rehab, Family and patient's first [address]    This worker to follow "    MU Wilson  7/1/2019  10:50

## 2019-07-01 NOTE — PROGRESS NOTES
Progress Note - Cardiology   Jony Lyons 80 y o  female MRN: [id number]  Unit/Bed#: [place] [address]01 Encounter: [id number]      Assessment/Recommendations/Discussion:   1  New onset atrial fibrillation  2  Frequent falls  3  Ambulatory dysfunction    · Heart rate slightly increased this morning, will increase metoprolol to 25 mg p o  B i d   · As per initial consultation, patient suboptimal anticoagulation candidate in the setting of frequent falls, advanced age, and comorbidities  · Echocardiogram today        Subjective:  Patient seen and examined, denies chest pain, shortness of breath, dizziness, lower extremity edema, palpitations          Physical Exam:  GEN:  NAD  HEENT:  MMM, NCAT, pink conjunctiva, EOMI, nonicteric sclera  CV:  NO JVD/HJR, irregularly irregular, NO M/R/G, +S1/S2, NO PARASTERNAL HEAVE/THRILL, NO LE EDEMA, NO HEPATIC SYSTOLIC PULSATION, WARM EXTREMITIES  RESP:  CTAB/L  ABD:  SOFT, NT, NO GROSS ORGANOMEGALY        Vitals:   /95 (BP Location: Right arm)   Pulse 76   Temp 97 8 °F (36 6 °C) (Temporal)   Resp 18   SpO2 98%   There were no vitals filed for this visit      Intake/Output Summary (Last 24 hours) at 7/1/2019 1039  Last data filed at 7/1/2019 0428  Gross per 24 hour   Intake 180 ml   Output 438 ml   Net -258 ml       TELEMETRY:  Atrial fibrillation, mild rapid ventricular response  Lab Results:  Results from last 7 days   Lab Units 07/01/19  0535   WBC Thousand/uL 5 37   HEMOGLOBIN g/dL 12 2   HEMATOCRIT % 38 6   PLATELETS Thousands/uL 183     Results from last 7 days   Lab Units 07/01/19  0535   POTASSIUM mmol/L 4 1   CHLORIDE mmol/L 107   CO2 mmol/L 30   BUN mg/dL 17   CREATININE mg/dL 0 84   CALCIUM mg/dL 9 1   ALK PHOS U/L 64   ALT U/L 111*   AST U/L 85*     Results from last 7 days   Lab Units 07/01/19  0535   POTASSIUM mmol/L 4 1   CHLORIDE mmol/L 107   CO2 mmol/L 30   BUN mg/dL 17   CREATININE mg/dL 0 84   CALCIUM mg/dL 9 1           Medications:    Current Facility-Administered Medications:     acetaminophen (TYLENOL) tablet 650 mg, 650 mg, Oral, Q6H PRN, Mandeep Boo, PA-C, 650 mg at 06/29/19 2214    ALPRAZolam (XANAX) tablet 0 25 mg, 0 25 mg, Oral, 4x Daily PRN, Mandeep Boo, PA-C, 0 25 mg at 07/01/19 0859    aspirin chewable tablet 81 mg, 81 mg, Oral, Daily, Tracy Velarde PA-C, 81 mg at 07/01/19 0859    diazepam (VALIUM) injection 2 5 mg, 2 5 mg, Intravenous, Q8H PRN, Eileen Healy DO, 2 5 mg at 07/01/19 0417    enoxaparin (LOVENOX) subcutaneous injection 30 mg, 30 mg, Subcutaneous, Q24H [facility], Tracy Velarde, PA-C, 30 mg at 07/01/19 0900    escitalopram (LEXAPRO) tablet 20 mg, 20 mg, Oral, Daily, Tracy Velarde PA-C, 20 mg at 07/01/19 0859    meclizine (ANTIVERT) tablet 25 mg, 25 mg, Oral, Q8H PRN, Tracy Velarde PA-C, 25 mg at 07/01/19 0859    melatonin tablet 6 mg, 6 mg, Oral, HS, Tracy Velarde, PA-C, 6 mg at 06/30/19 2107    metoprolol tartrate (LOPRESSOR) tablet 25 mg, 25 mg, Oral, Q12H [facility], David Melo DO    ondansetron (ZOFRAN) injection 4 mg, 4 mg, Intravenous, Q6H PRN, Mandeep Boo, PA-C    This note was completed in part utilizing [employer] Direct Software  Grammatical errors, random word insertions, spelling mistakes, and incomplete sentences may be an occasional consequence of this system secondary to software limitations, ambient noise, and hardware issues  If you have any questions or concerns about the content, text, or information contained within the body of this dictation, please contact the provider for clarification

## 2019-07-01 NOTE — PROGRESS NOTES
Progress Note - Stanislav Leblanc 1923, 80 y o  female MRN: [id number]    Unit/Bed#: [place] [address]01 Encounter: [id number]    Primary Care Provider: Vivi Davila DO   Date and time admitted to hospital: 2019 12:47 PM        * Ambulatory dysfunction  Assessment & Plan  Has had a few falls recently  Now paralyzingly anxious  Stiffens up and cannot move at times  Psych to see  Will need placement    Anxiety  Assessment & Plan  As above  Defer to psych            Subjective:   Still feels anxious, but it is a little better after a valium  Cannot walk right now due to anxiety  Objective:     Vitals:   Temp (24hrs), Av °F (36 7 °C), Min:97 3 °F (36 3 °C), Max:98 9 °F (37 2 °C)    Temp:  [97 3 °F (36 3 °C)-98 9 °F (37 2 °C)] 98 9 °F (37 2 °C)  HR:  [76-91] 90  Resp:  [18-20] 18  BP: (111-155)/() 140/65  SpO2:  [94 %-98 %] 94 %  There is no height or weight on file to calculate BMI  Input and Output Summary (last 24 hours): Intake/Output Summary (Last 24 hours) at 2019 1644  Last data filed at 2019 0428  Gross per 24 hour   Intake    Output 238 ml   Net -238 ml       Physical Exam:     Physical Exam   HENT:   Head: Normocephalic and atraumatic  Eyes: Pupils are equal, round, and reactive to light  EOM are normal    Cardiovascular: Normal rate and regular rhythm  Exam reveals no gallop and no friction rub  No murmur heard  Pulmonary/Chest: Effort normal and breath sounds normal  She has no wheezes  She has no rales  Abdominal: Soft  Bowel sounds are normal  There is no tenderness  Musculoskeletal: She exhibits no edema  Nursing note and vitals reviewed              Additional Data:     Labs:    Results from last 7 days   Lab Units 19  0535   WBC Thousand/uL 5 37   HEMOGLOBIN g/dL 12 2   HEMATOCRIT % 38 6   PLATELETS Thousands/uL 183   NEUTROS PCT % 77*   LYMPHS PCT % 15   MONOS PCT % 7   EOS PCT % 1     Results from last 7 days   Lab Units 19  0535 POTASSIUM mmol/L 4 1   CHLORIDE mmol/L 107   CO2 mmol/L 30   BUN mg/dL 17   CREATININE mg/dL 0 84   CALCIUM mg/dL 9 1   ALK PHOS U/L 64   ALT U/L 111*   AST U/L 85*     Results from last 7 days   Lab Units 07/01/19  0535   INR  1 10                   * I Have Reviewed All Lab Data     Recent Cultures (last 7 days):             Last 24 Hours Medication List:     Current Facility-Administered Medications:  acetaminophen 650 mg Oral Q6H PRN Tracy Piger, PA-C   ALPRAZolam 0 25 mg Oral 4x Daily PRN Flower Poot, PA-C   aspirin 81 mg Oral Daily Tracy Piger, PA-C   diazepam 2 5 mg Intravenous Q8H PRN Cherdayn Martin DO   enoxaparin 30 mg Subcutaneous Q24H [address] Tracy Piger, PA-C   escitalopram 20 mg Oral Daily Tracy Piger, PA-C   meclizine 25 mg Oral Q8H PRN Tracy Piger, PA-C   melatonin 6 mg Oral HS Tracy Piger, PA-C   metoprolol tartrate 25 mg Oral Q12H [address] Rujul Kameron,    OLANZapine 2 5 mg Oral HS RANJANA Akers   ondansetron 4 mg Intravenous Q6H PRN Flower New PA-C         VTE Pharmacologic Prophylaxis:   Pharmacologic: Enoxaparin (Lovenox)      Current Length of Stay: 2 day(s)    Current Patient Status: Inpatient       Discharge Plan: will need STR/SNF/group home placement    Code Status: Level 1 - Full Code           Today, Patient Was Seen By: Radha Webber DO    ** Please Note: Dictation voice to text software may have been used in the creation of this document   **

## 2019-07-01 NOTE — PLAN OF CARE
Problem: Potential for Falls  Goal: Patient will remain free of falls  Description  INTERVENTIONS:  - Assess patient frequently for physical needs  -  Identify cognitive and physical deficits and behaviors that affect risk of falls    -  [place] fall precautions as indicated by assessment   - Educate patient/family on patient safety including physical limitations  - Instruct patient to call for assistance with activity based on assessment  - Modify environment to reduce risk of injury  - Consider OT/PT consult to assist with strengthening/mobility  Outcome: Progressing     Problem: PAIN - ADULT  Goal: Verbalizes/displays adequate comfort level or baseline comfort level  Description  Interventions:  - Encourage patient to monitor pain and request assistance  - Assess pain using appropriate pain scale  - Administer analgesics based on type and severity of pain and evaluate response  - Implement non-pharmacological measures as appropriate and evaluate response  - Consider cultural and social influences on pain and pain management  - Notify physician/advanced practitioner if interventions unsuccessful or patient reports new pain  Outcome: Progressing     Problem: SAFETY ADULT  Goal: Maintain or return to baseline ADL function  Description  INTERVENTIONS:  -  Assess patient's ability to carry out ADLs; assess patient's baseline for ADL function and identify physical deficits which impact ability to perform ADLs (bathing, care of mouth/teeth, toileting, grooming, dressing, etc )  - Assess/evaluate cause of self-care deficits   - Assess range of motion  - Assess patient's mobility; develop plan if impaired  - Assess patient's need for assistive devices and provide as appropriate  - Encourage maximum independence but intervene and supervise when necessary  ¯ Involve family in performance of ADLs  ¯ Assess for home care needs following discharge   ¯ Request OT consult to assist with ADL evaluation and planning for discharge  ¯ Provide patient education as appropriate  Outcome: Progressing  Goal: Maintain or return mobility status to optimal level  Description  INTERVENTIONS:  - Assess patient's baseline mobility status (ambulation, transfers, stairs, etc )    - Identify cognitive and physical deficits and behaviors that affect mobility  - Identify mobility aids required to assist with transfers and/or ambulation (gait belt, sit-to-stand, lift, walker, cane, etc )  - [place] fall precautions as indicated by assessment  - Record patient progress and toleration of activity level on Mobility SBAR; progress patient to next Phase/Stage  - Instruct patient to call for assistance with activity based on assessment  - Request Rehabilitation consult to assist with strengthening/weightbearing, etc   Outcome: Progressing     Problem: DISCHARGE PLANNING  Goal: Discharge to home or other facility with appropriate resources  Description  INTERVENTIONS:  - Identify barriers to discharge w/patient and caregiver  - Arrange for needed discharge resources and transportation as appropriate  - Identify discharge learning needs (meds, wound care, etc )  - Arrange for interpretive services to assist at discharge as needed  - Refer to Case Management Department for coordinating discharge planning if the patient needs post-hospital services based on physician/advanced practitioner order or complex needs related to functional status, cognitive ability, or social support system  Outcome: Progressing     Problem: Knowledge Deficit  Goal: Patient/family/caregiver demonstrates understanding of disease process, treatment plan, medications, and discharge instructions  Description  Complete learning assessment and assess knowledge base    Interventions:  - Provide teaching at level of understanding  - Provide teaching via preferred learning methods  Outcome: Progressing     Problem: CARDIOVASCULAR - ADULT  Goal: Maintains optimal cardiac output and hemodynamic stability  Description  INTERVENTIONS:  - Monitor I/O, vital signs and rhythm  - Monitor for S/S and trends of decreased cardiac output i e  bleeding, hypotension  - Administer and titrate ordered vasoactive medications to optimize hemodynamic stability  - Assess quality of pulses, skin color and temperature  - Assess for signs of decreased coronary artery perfusion - ex   Angina  - Instruct patient to report change in severity of symptoms  Outcome: Progressing  Goal: Absence of cardiac dysrhythmias or at baseline rhythm  Description  INTERVENTIONS:  - Continuous cardiac monitoring, monitor vital signs, obtain 12 lead EKG if indicated  - Administer antiarrhythmic and heart rate control medications as ordered  - Monitor electrolytes and administer replacement therapy as ordered  Outcome: Progressing     Problem: MUSCULOSKELETAL - ADULT  Goal: Maintain or return mobility to safest level of function  Description  INTERVENTIONS:  - Assess patient's ability to carry out ADLs; assess patient's baseline for ADL function and identify physical deficits which impact ability to perform ADLs (bathing, care of mouth/teeth, toileting, grooming, dressing, etc )  - Assess/evaluate cause of self-care deficits   - Assess range of motion  - Assess patient's mobility; develop plan if impaired  - Assess patient's need for assistive devices and provide as appropriate  - Encourage maximum independence but intervene and supervise when necessary  - Involve family in performance of ADLs  - Assess for home care needs following discharge   - Request OT consult to assist with ADL evaluation and planning for discharge  - Provide patient education as appropriate  Outcome: Progressing  Goal: Maintain proper alignment of affected body part  Description  INTERVENTIONS:  - Support, maintain and protect limb and body alignment  - Provide pt/fam with appropriate education  Outcome: Progressing     Problem: Prexisting or High Potential for Compromised Skin Integrity  Goal: Skin integrity is maintained or improved  Description  INTERVENTIONS:  - Identify patients at risk for skin breakdown  - Assess and monitor skin integrity  - Assess and monitor nutrition and hydration status  - Monitor labs (i e  albumin)  - Assess for incontinence   - Turn and reposition patient  - Assist with mobility/ambulation  - Relieve pressure over bony prominences  - Avoid friction and shearing  - Provide appropriate hygiene as needed including keeping skin clean and dry  - Evaluate need for skin moisturizer/barrier cream  - Collaborate with interdisciplinary team (i e  Nutrition, Rehabilitation, etc )   - Patient/family teaching  Outcome: Progressing

## 2019-07-02 PROCEDURE — G8979 MOBILITY GOAL STATUS: HCPCS

## 2019-07-02 PROCEDURE — 97167 OT EVAL HIGH COMPLEX 60 MIN: CPT

## 2019-07-02 PROCEDURE — G8987 SELF CARE CURRENT STATUS: HCPCS

## 2019-07-02 PROCEDURE — G8978 MOBILITY CURRENT STATUS: HCPCS

## 2019-07-02 PROCEDURE — G8988 SELF CARE GOAL STATUS: HCPCS

## 2019-07-02 PROCEDURE — 99232 SBSQ HOSP IP/OBS MODERATE 35: CPT | Performed by: HOSPITALIST

## 2019-07-02 PROCEDURE — 99232 SBSQ HOSP IP/OBS MODERATE 35: CPT | Performed by: INTERNAL MEDICINE

## 2019-07-02 PROCEDURE — 97163 PT EVAL HIGH COMPLEX 45 MIN: CPT

## 2019-07-02 RX ORDER — FUROSEMIDE 10 MG/ML
20 INJECTION INTRAMUSCULAR; INTRAVENOUS DAILY
Status: DISCONTINUED | OUTPATIENT
Start: 2019-07-02 | End: 2019-07-03

## 2019-07-02 RX ADMIN — METOPROLOL TARTRATE 25 MG: 25 TABLET, FILM COATED ORAL at 08:12

## 2019-07-02 RX ADMIN — ALPRAZOLAM 0.25 MG: 0.25 TABLET ORAL at 20:56

## 2019-07-02 RX ADMIN — ESCITALOPRAM OXALATE 20 MG: 10 TABLET ORAL at 08:12

## 2019-07-02 RX ADMIN — ENOXAPARIN SODIUM 30 MG: 30 INJECTION SUBCUTANEOUS at 08:12

## 2019-07-02 RX ADMIN — METOPROLOL TARTRATE 25 MG: 25 TABLET, FILM COATED ORAL at 20:56

## 2019-07-02 RX ADMIN — MELATONIN TAB 3 MG 6 MG: 3 TAB at 21:00

## 2019-07-02 RX ADMIN — OLANZAPINE 2.5 MG: 5 TABLET, FILM COATED ORAL at 21:00

## 2019-07-02 RX ADMIN — ASPIRIN 81 MG 81 MG: 81 TABLET ORAL at 08:12

## 2019-07-02 RX ADMIN — ALPRAZOLAM 0.25 MG: 0.25 TABLET ORAL at 16:21

## 2019-07-02 NOTE — PLAN OF CARE
Problem: Potential for Falls  Goal: Patient will remain free of falls  Description  INTERVENTIONS:  - Assess patient frequently for physical needs  -  Identify cognitive and physical deficits and behaviors that affect risk of falls    -  [place] fall precautions as indicated by assessment   - Educate patient/family on patient safety including physical limitations  - Instruct patient to call for assistance with activity based on assessment  - Modify environment to reduce risk of injury  - Consider OT/PT consult to assist with strengthening/mobility  Outcome: Progressing     Problem: PAIN - ADULT  Goal: Verbalizes/displays adequate comfort level or baseline comfort level  Description  Interventions:  - Encourage patient to monitor pain and request assistance  - Assess pain using appropriate pain scale  - Administer analgesics based on type and severity of pain and evaluate response  - Implement non-pharmacological measures as appropriate and evaluate response  - Consider cultural and social influences on pain and pain management  - Notify physician/advanced practitioner if interventions unsuccessful or patient reports new pain  Outcome: Progressing     Problem: SAFETY ADULT  Goal: Maintain or return to baseline ADL function  Description  INTERVENTIONS:  -  Assess patient's ability to carry out ADLs; assess patient's baseline for ADL function and identify physical deficits which impact ability to perform ADLs (bathing, care of mouth/teeth, toileting, grooming, dressing, etc )  - Assess/evaluate cause of self-care deficits   - Assess range of motion  - Assess patient's mobility; develop plan if impaired  - Assess patient's need for assistive devices and provide as appropriate  - Encourage maximum independence but intervene and supervise when necessary  ¯ Involve family in performance of ADLs  ¯ Assess for home care needs following discharge   ¯ Request OT consult to assist with ADL evaluation and planning for discharge  ¯ Provide patient education as appropriate  Outcome: Progressing  Goal: Maintain or return mobility status to optimal level  Description  INTERVENTIONS:  - Assess patient's baseline mobility status (ambulation, transfers, stairs, etc )    - Identify cognitive and physical deficits and behaviors that affect mobility  - Identify mobility aids required to assist with transfers and/or ambulation (gait belt, sit-to-stand, lift, walker, cane, etc )  - [place] fall precautions as indicated by assessment  - Record patient progress and toleration of activity level on Mobility SBAR; progress patient to next Phase/Stage  - Instruct patient to call for assistance with activity based on assessment  - Request Rehabilitation consult to assist with strengthening/weightbearing, etc   Outcome: Progressing     Problem: DISCHARGE PLANNING  Goal: Discharge to home or other facility with appropriate resources  Description  INTERVENTIONS:  - Identify barriers to discharge w/patient and caregiver  - Arrange for needed discharge resources and transportation as appropriate  - Identify discharge learning needs (meds, wound care, etc )  - Arrange for interpretive services to assist at discharge as needed  - Refer to Case Management Department for coordinating discharge planning if the patient needs post-hospital services based on physician/advanced practitioner order or complex needs related to functional status, cognitive ability, or social support system  Outcome: Progressing     Problem: Knowledge Deficit  Goal: Patient/family/caregiver demonstrates understanding of disease process, treatment plan, medications, and discharge instructions  Description  Complete learning assessment and assess knowledge base    Interventions:  - Provide teaching at level of understanding  - Provide teaching via preferred learning methods  Outcome: Progressing     Problem: CARDIOVASCULAR - ADULT  Goal: Maintains optimal cardiac output and hemodynamic stability  Description  INTERVENTIONS:  - Monitor I/O, vital signs and rhythm  - Monitor for S/S and trends of decreased cardiac output i e  bleeding, hypotension  - Administer and titrate ordered vasoactive medications to optimize hemodynamic stability  - Assess quality of pulses, skin color and temperature  - Assess for signs of decreased coronary artery perfusion - ex   Angina  - Instruct patient to report change in severity of symptoms  Outcome: Progressing  Goal: Absence of cardiac dysrhythmias or at baseline rhythm  Description  INTERVENTIONS:  - Continuous cardiac monitoring, monitor vital signs, obtain 12 lead EKG if indicated  - Administer antiarrhythmic and heart rate control medications as ordered  - Monitor electrolytes and administer replacement therapy as ordered  Outcome: Progressing     Problem: MUSCULOSKELETAL - ADULT  Goal: Maintain or return mobility to safest level of function  Description  INTERVENTIONS:  - Assess patient's ability to carry out ADLs; assess patient's baseline for ADL function and identify physical deficits which impact ability to perform ADLs (bathing, care of mouth/teeth, toileting, grooming, dressing, etc )  - Assess/evaluate cause of self-care deficits   - Assess range of motion  - Assess patient's mobility; develop plan if impaired  - Assess patient's need for assistive devices and provide as appropriate  - Encourage maximum independence but intervene and supervise when necessary  - Involve family in performance of ADLs  - Assess for home care needs following discharge   - Request OT consult to assist with ADL evaluation and planning for discharge  - Provide patient education as appropriate  Outcome: Progressing  Goal: Maintain proper alignment of affected body part  Description  INTERVENTIONS:  - Support, maintain and protect limb and body alignment  - Provide pt/fam with appropriate education  Outcome: Progressing     Problem: Prexisting or High Potential for Compromised Skin Integrity  Goal: Skin integrity is maintained or improved  Description  INTERVENTIONS:  - Identify patients at risk for skin breakdown  - Assess and monitor skin integrity  - Assess and monitor nutrition and hydration status  - Monitor labs (i e  albumin)  - Assess for incontinence   - Turn and reposition patient  - Assist with mobility/ambulation  - Relieve pressure over bony prominences  - Avoid friction and shearing  - Provide appropriate hygiene as needed including keeping skin clean and dry  - Evaluate need for skin moisturizer/barrier cream  - Collaborate with interdisciplinary team (i e  Nutrition, Rehabilitation, etc )   - Patient/family teaching  Outcome: Progressing

## 2019-07-02 NOTE — PROGRESS NOTES
Progress Note - Yoni Yates 1923, 80 y o  female MRN: [id number]    Unit/Bed#: [place] [address] -01 Encounter: [id number]    Primary Care Provider: Phineas Kanner, DO   Date and time admitted to hospital: 2019 12:47 PM        * Ambulatory dysfunction  Assessment & Plan  Has had a few falls recently  Now paralyzingly anxious  Stiffens up and cannot move at times  She is doing a little better with psychiatric meds  Will need placement    Anxiety  Assessment & Plan  As above  Defer to psych            Subjective:   She is a little less anxious today  She worked with PT but was very fearful of falling even with using walker and having PT there  Objective:     Vitals:   Temp (24hrs), Av 7 °F (37 1 °C), Min:98 2 °F (36 8 °C), Max:99 2 °F (37 3 °C)    Temp:  [98 2 °F (36 8 °C)-99 2 °F (37 3 °C)] 99 2 °F (37 3 °C)  HR:  [67-90] 72  Resp:  [18] 18  BP: ()/(60-96) 106/60  SpO2:  [94 %-96 %] 96 %  There is no height or weight on file to calculate BMI  Input and Output Summary (last 24 hours): Intake/Output Summary (Last 24 hours) at 2019 1636  Last data filed at 2019 1414  Gross per 24 hour   Intake    Output 500 ml   Net -500 ml       Physical Exam:     Physical Exam   HENT:   Head: Normocephalic and atraumatic  Eyes: Pupils are equal, round, and reactive to light  EOM are normal    Cardiovascular: Normal rate and regular rhythm  Exam reveals no gallop and no friction rub  No murmur heard  Pulmonary/Chest: Effort normal and breath sounds normal  She has no wheezes  She has no rales  Abdominal: Soft  Bowel sounds are normal  There is no tenderness  Musculoskeletal: She exhibits no edema  Nursing note and vitals reviewed              Additional Data:     Labs:    Results from last 7 days   Lab Units 19  0535   WBC Thousand/uL 5 37   HEMOGLOBIN g/dL 12 2   HEMATOCRIT % 38 6   PLATELETS Thousands/uL 183   NEUTROS PCT % 77*   LYMPHS PCT % 15   MONOS PCT % 7   EOS PCT % 1     Results from last 7 days   Lab Units 07/01/19  0535   POTASSIUM mmol/L 4 1   CHLORIDE mmol/L 107   CO2 mmol/L 30   BUN mg/dL 17   CREATININE mg/dL 0 84   CALCIUM mg/dL 9 1   ALK PHOS U/L 64   ALT U/L 111*   AST U/L 85*     Results from last 7 days   Lab Units 07/01/19  0535   INR  1 10                   * I Have Reviewed All Lab Data     Recent Cultures (last 7 days):             Last 24 Hours Medication List:     Current Facility-Administered Medications:  acetaminophen 650 mg Oral Q6H PRN Tracy Piger, PA-C   ALPRAZolam 0 25 mg Oral 4x Daily PRN LoriANUSHKA Gu-IDRIS   aspirin 81 mg Oral Daily Tracy Piger, PA-IDRIS   diazepam 2 5 mg Intravenous Q8H PRN Theotis Dose, DO   enoxaparin 30 mg Subcutaneous Q24H [address] Tracy Piger, PA-IDRIS   escitalopram 20 mg Oral Daily Tracy Piger, PA-C   furosemide 20 mg Intravenous Daily Rujul Melo, DO   meclizine 25 mg Oral Q8H PRN Tracy Piger, PA-IDRIS   melatonin 6 mg Oral HS Tracy Patelr, PA-IDRIS   metoprolol tartrate 25 mg Oral Q12H [address] Rujul Melo, DO   OLANZapine 2 5 mg Oral HS RANJANA Akers   ondansetron 4 mg Intravenous Q6H PRN Lori Arellano PA-C         VTE Pharmacologic Prophylaxis:   Pharmacologic: Enoxaparin (Lovenox)      Current Length of Stay: 3 day(s)    Current Patient Status: Inpatient       Discharge Plan: will need SNF  In the next day or two    Code Status: Level 1 - Full Code           Today, Patient Was Seen By: Marcela De Leon DO    ** Please Note: Dictation voice to text software may have been used in the creation of this document   **

## 2019-07-02 NOTE — PLAN OF CARE
Problem: PHYSICAL THERAPY ADULT  Goal: Performs mobility at highest level of function for planned discharge setting  See evaluation for individualized goals  Description  Treatment/Interventions: Functional transfer training, LE strengthening/ROM, Therapeutic exercise, Endurance training, Patient/family training, Equipment eval/education, Bed mobility, Gait training, Spoke to nursing, OT, Family  Equipment Recommended: Other (Comment)(continue to assess/monitor)       See flowsheet documentation for full assessment, interventions and recommendations  Note:   Prognosis: Fair  Problem List: Decreased strength, Decreased endurance, Impaired balance, Decreased mobility, Decreased coordination, Decreased cognition, Decreased safety awareness, Impaired judgement  Assessment: PT consult received and evaluation complete  Pt is 89 y o  Female admitted from home alone for ambulatory dysfucntion presenting to hospital w/ complaints of panic attacks/increased anxiety  Pt lives alone but children/family check on her frequently  Pt agreeable to participate w/ therapy and identified by 2 patient identifiers: name and birth date  Precautions include: chair alarm, bed alarm, cognitive deficits, fall risk, multiple lines and telemetry  Pt presents supine in bed w/ daughter present  Pt has orders for up with assistance  PTA pt ambulatory with RW, assisted ADLS, and assisted w/ IADLS, 2 falls  Pt presents w/ RLE MMT 3+/5, LLE MMT 3+/5, modAx2 rolling to the R and L in bed, supine>sit modAx2, sit>stand maxAx2, stand>sit dependent, sit>supine maxAx2, and no pain  Pt demonstrates increased anxiety and panic attack upon standing up and limits overall tolerance  Pt needing increased emotional support and encouragement  Pt has high fear of falling and feels that she is falling even when on supportive surface   At end of PT evaluation pt left supine in bed w/ daughter present, bed alarm armed, all needs in reach, call bell and phone in hand, and RN aware of patient status  Pt would benefit from continued skilled PT for deficits in strength, balance, locomotion, functional endurance, functional mobility, safety awareness with mobility and coordination At this time recommend d/c short term rehab History: co-morbidities, fall risk, lives alone, JUDITH, multiple lines, telemetry, assisted for ADLS, assisted for IADLS, recent admission and impaired cognition Exam: strength, balance, locomotion, functional endurance, functional mobility, safety awareness with mobility and coordination Barthel Index: 25 Modified [place]:4 Clinical: unstable/unpredictable: fall risk, emotional and behavioral factors, chair alarm, bed alarm, impaired cognition, decreased safety awareness, use of AD and 2 person assist Complexity: high  Barriers to Discharge: Inaccessible home environment, Decreased caregiver support  Barriers to Discharge Comments: ability for family to manage patient's care safely  Recommendation: Other (Comment)(STR)     PT - OK to Discharge: Yes(yes if to rehab; no to home)    See flowsheet documentation for full assessment        Stefano Michele, PT, DPT, GCS

## 2019-07-02 NOTE — PROGRESS NOTES
Progress Note - Cardiology   Ramya Bolton 80 y o  female MRN: [id number]  Unit/Bed#: [place] [address] -01 Encounter: [id number]      Assessment/Recommendations/Discussion:   1  New onset atrial fibrillation  2  Acute on chronic diastolic heart failure  3  Frequent falls  4  Ambulatory dysfunction  5  Dementia    · Heart rate better controlled after metoprolol dosing increased to 25 b i d , continue the same  · Patient's suboptimal candidate for anticoagulation in light of frequent falls, advanced age, and comorbidities  · Echocardiogram with normal left ventricle systolic function, although IVC is significantly dilated  Probable component of acute diastolic heart failure  Will check NT proBNP, and start furosemide 20 mg IV once daily      Subjective:  Patient seen and examined, appears confused, anxious          Physical Exam:  GEN:  NAD  HEENT:  MMM, NCAT, pink conjunctiva, EOMI, nonicteric sclera  CV:  +JVD/HJR, irregularly irregular rhythm, NO M/R/G, +S1/S2, NO PARASTERNAL HEAVE/THRILL, NO LE EDEMA, NO HEPATIC SYSTOLIC PULSATION, WARM EXTREMITIES  RESP:  CTAB/L  ABD:  SOFT, NT, NO GROSS ORGANOMEGALY        Vitals:   /85 (BP Location: Left arm)   Pulse 67   Temp 98 2 °F (36 8 °C) (Temporal)   Resp 18   SpO2 94%   There were no vitals filed for this visit    No intake or output data in the 24 hours ending 07/02/19 1056    TELEMETRY:  Atrial fibrillation  Lab Results:  Results from last 7 days   Lab Units 07/01/19  0535   WBC Thousand/uL 5 37   HEMOGLOBIN g/dL 12 2   HEMATOCRIT % 38 6   PLATELETS Thousands/uL 183     Results from last 7 days   Lab Units 07/01/19  0535   POTASSIUM mmol/L 4 1   CHLORIDE mmol/L 107   CO2 mmol/L 30   BUN mg/dL 17   CREATININE mg/dL 0 84   CALCIUM mg/dL 9 1   ALK PHOS U/L 64   ALT U/L 111*   AST U/L 85*     Results from last 7 days   Lab Units 07/01/19  0535   POTASSIUM mmol/L 4 1   CHLORIDE mmol/L 107   CO2 mmol/L 30   BUN mg/dL 17   CREATININE mg/dL 0 84   CALCIUM mg/dL 9 1 Medications:    Current Facility-Administered Medications:     acetaminophen (TYLENOL) tablet 650 mg, 650 mg, Oral, Q6H PRN, Flower Poot, PA-C, 650 mg at 06/29/19 2214    ALPRAZolam (XANAX) tablet 0 25 mg, 0 25 mg, Oral, 4x Daily PRN, Flower Poot, PA-C, 0 25 mg at 07/01/19 2007    aspirin chewable tablet 81 mg, 81 mg, Oral, Daily, Tracy Piger, PA-C, 81 mg at 07/02/19 8641    diazepam (VALIUM) injection 2 5 mg, 2 5 mg, Intravenous, Q8H PRN, Cherylene Maiers, DO, 2 5 mg at 07/01/19 0417    enoxaparin (LOVENOX) subcutaneous injection 30 mg, 30 mg, Subcutaneous, Q24H [address], Tracy Piger, PA-C, 30 mg at 07/02/19 5475    escitalopram (LEXAPRO) tablet 20 mg, 20 mg, Oral, Daily, Tracy Piger, PA-C, 20 mg at 07/02/19 3758    furosemide (LASIX) injection 20 mg, 20 mg, Intravenous, Daily, Rujul Melo, DO    meclizine (ANTIVERT) tablet 25 mg, 25 mg, Oral, Q8H PRN, Tracy Piger, PA-C, 25 mg at 07/01/19 0859    melatonin tablet 6 mg, 6 mg, Oral, HS, Tracy Piger, PA-C, 6 mg at 07/01/19 2008    metoprolol tartrate (LOPRESSOR) tablet 25 mg, 25 mg, Oral, Q12H [address], Rujul Melo, DO, 25 mg at 07/02/19 7993    OLANZapine (ZyPREXA) tablet 2 5 mg, 2 5 mg, Oral, HS, RANJANA Akers, 2 5 mg at 07/01/19 2224    ondansetron (ZOFRAN) injection 4 mg, 4 mg, Intravenous, Q6H PRN, Flower Poot, PA-C    This note was completed in part utilizing [employer] Software  Grammatical errors, random word insertions, spelling mistakes, and incomplete sentences may be an occasional consequence of this system secondary to software limitations, ambient noise, and hardware issues  If you have any questions or concerns about the content, text, or information contained within the body of this dictation, please contact the provider for clarification

## 2019-07-02 NOTE — OCCUPATIONAL THERAPY NOTE
[address] Evaluation     Patient Name: Larry Melchor  NJIOV'Z Date: 7/2/2019  Problem List  Patient Active Problem List   Diagnosis    Age-related osteoporosis without current pathological fracture    Anxiety    Essential hypertension    Memory loss    Mixed hyperlipidemia    Medicare annual wellness visit, subsequent    Ambulatory dysfunction    Weakness    Murmur, cardiac    New onset a-fib ([facility] [address] )    Hypernatremia     Past Medical History  Past Medical History:   Diagnosis Date    HTN (hypertension)      Past Surgical History  History reviewed  No pertinent surgical history  07/02/19 1212   Note Type   Note type Eval/Treat   Restrictions/Precautions   Weight Bearing Precautions Per Order No   Other Precautions Cognitive; Chair Alarm; Bed Alarm; Fall Risk;Multiple lines;Telemetry  (fear of falling)   Pain Assessment   Pain Assessment No/denies pain   Pain Score No Pain   Home Living   Type of Home Apartment   Home Layout One level; Able to live on main level with bedroom/bathroom; Performs ADLs on one level  (stair glide to enter)   Bathroom Shower/Tub Tub/shower unit   Bathroom Toilet Standard   Bathroom Equipment Grab bars in shower; Shower chair;Commode   Bathroom Accessibility Accessible   Home Equipment Walker;Quad cane  (rollator)   Additional Comments pt lives alone; family has been checking on her frequently and eat dinners w/ her daily; assist for ADLs, alone at night   Prior Function   Level of [place] Needs assistance with IADLs; Needs assistance with ADLs and functional mobility   Lives With Alone   Receives Help From Family   ADL Assistance Needs assistance  (assist showers and increased assist dressing)   IADLs Needs assistance   Falls in the last 6 months 1 to 4   Vocational Retired   Comments pt w/ recent stay at [facility] 6/7-6/11 then Son for rehab and d/c home on 6/26 and daughter reports increased anxiety and fear of falling and becomes very rigid/stiff and fear of falling and increased anxiety   Lifestyle   Autonomy per pt daughter reports pt increased assist w/ ADLs, assist w/ functional transfers and mobility w/ RW, assist w/ IADLs, assist transportation   Reciprocal Relationships family; children and grandkids   Service to Others retired    Intrinsic Gratification listening to music and arranging artifical cano   ADL   [address] 4  Minimal Assistance   Grooming Assistance 3  Moderate Assistance   [address] 3  Moderate Assistance   LB [address] 2  [address] [facility] 3  Moderate Assistance    [address] 2  Maximal [address]  2  Maximal Assistance   Bed Mobility   Rolling R 3  Moderate assistance   Additional items Assist x 2; Increased time required;Verbal cues; Bedrails   Rolling L 3  Moderate assistance   Additional items Assist x 2; Increased time required; Bedrails   Supine to Sit 3  Moderate assistance   Additional items Assist x 2; Increased time required;Verbal cues;LE management; Bedrails;HOB elevated   Sit to Supine 2  Maximal assistance   Additional items Assist x 2; Increased time required;Verbal cues;LE management; Bedrails   Additional Comments increased time to complete w/ cues for safety   Transfers   Sit to Stand 2  Maximal assistance   Additional items Assist x 2; Increased time required;Verbal cues;Armrests   Stand to Sit 1  Dependent   Additional items Assist x 2; Increased time required;Verbal cues   Additional Comments cues for safety and hand placement; increased fear of falling; rigidity and stiffness and sliding and requiring assistance to return to bed   Functional Mobility   Functional Mobility   (unable to assess)   Balance   Static Sitting Poor +   Dynamic Sitting Poor   Static Standing Poor -   Dynamic Standing Poor -   Activity Tolerance   Activity Tolerance Patient limited by fatigue;Treatment limited secondary to medical complications (Comment)  (fear of falling, increased anxiety)   Nurse Made Aware appropriate to see per Mariann CHUA   RUE Assessment   RUE Assessment WFL  (3+/5)   LUE Assessment   LUE Assessment WFL  (3+/5)   Hand Function   Gross Motor Coordination Functional   Fine Motor Coordination Impaired   Sensation   Light Touch No apparent deficits   Proprioception   Proprioception No apparent deficits   Vision-Basic Assessment   Current Vision Wears glasses all the time   Vision - Complex Assessment   Ocular Range of Motion [facility] Able to read clock/calendar on wall without difficulty   Perception   Inattention/Neglect Appears intact   Cognition   Overall Cognitive Status Impaired   Arousal/Participation Responsive   Attention Attends with cues to redirect   Orientation Level Oriented to person;Disoriented to time;Disoriented to place; Disoriented to situation   Memory Decreased short term memory;Decreased recall of recent events;Decreased recall of precautions   Following Commands Follows one step commands with increased time or repetition   Comments pt w/ increased anxiety and fear of falling, impaired insight and safety awareness; pt required redirection to task and emotional support as yelling of fear of falling even when laying in bed   Assessment   Limitation Decreased UE strength;Decreased ADL status; Decreased Safe judgement during ADL;Decreased endurance;Decreased cognition;Decreased sensation;Decreased high-level ADLs; Decreased self-care trans   Prognosis Fair   Assessment Pt is a 80 y o  female seen for OT evaluation s/p admit to [facility] on 6/29/2019 w/ Ambulatory dysfunction; anxiety and panic attacks  Comorbidities affecting pt's functional performance at time of assessment include: memory loss, HTN, a-fib, anxiety  Personal factors affecting pt at time of IE include: lives alone, impaired cognition   Prior to admission, pt was living w/ alone family comes t/o the day to assist patient and assist ADLs and provide meals; pt now requiring increased assist w/ transfers and mobility  Upon evaluation: Pt requires MOD assist x2 supine>sit bed mobility, MAX assist x2 sit>supine bed mobility w/ increased time to complete, MAX assist LB ADLs, MOD-MAX assist UB ADLs, MAX assist toileting, MAX assist x2 sit>stand w/ VCs for safety (increased fear of falling and anxiety)  2* the following deficits impacting occupational performance: impaired functional reach, decreased strength and endurance, impaired balance, impaired cognition, impaired activity tolerance, bradykinesia, flat affect, impaired insight and safety awareness, anxiety and fear of falling (pt yelling and shaky and required emotional support t/o), tremors  Pt to benefit from continued skilled OT tx while in the hospital to address deficits as defined above and maximize level of functional independence w ADL's and functional mobility  Occupational Performance areas to address include: grooming, bathing/shower, toilet hygiene, dressing, health maintenance, functional mobility, clothing management, cleaning and meal prep, formal cognitive assessment, safety education  From OT standpoint, recommendation at time of d/c would be short term rehab w/ potential LT placement  Goals   Patient Goals none expressed 2* impaired cognition   LTG Time Frame 10-14   Long Term Goal please see below goals   Plan   Treatment Interventions ADL retraining;Functional transfer training;UE strengthening/ROM; Patient/family training;Cognitive reorientation; Endurance training;Equipment evaluation/education; Compensatory technique education; Activityengagement; Energy conservation   Goal Expiration Date 07/16/19   OT Frequency 3-5x/wk   Recommendation   OT Discharge Recommendation Short Term Rehab  (w/ potential LT Placement)   Barthel Index   Feeding 5   Bathing 0   Grooming Score 0   Dressing Score 0   Bladder Score 5   Bowels Score 5   Toilet Use Score 5   Transfers (Bed/Chair) Score 5 Mobility (Level Surface) Score 0   Stairs Score 0   Barthel Index Score 25   Modified [place] Scale   Modified [place] Scale 4     Occupational Therapy Goals to be met in 10-14 days:  1) Pt will improve activity tolerance to G for min 30 min txment sessions to enhance ADLs  2) Pt will complete UB ADLs/self care w/ min assist and mod assist LB ADLs  3) Pt will complete toileting w/ min assist w/ G hygiene/thoroughness using DME PRN  4) Pt will improve functional transfers on/off all surfaces using DME PRN w/ G balance/safety including toileting w/ mod assist  5) Pt will improve fx'l mobility during I/ADl/leisure tasks using DME PRN w/ g balance/safety w/ mod assist  6) Pt will engage in ongoing cognitive assessment w/ G participation to A w/ safe d/c planning/recommendations  7) Pt will demonstrate G carryover of pt/caregiver education and training as appropriate w/ mod I  w/ G tolerance  8) Pt will engage in depression screen/leisure interest checklist w/ G participation to monitor s/s depression and ID 3 positive coping strategies to A w/ emotional regulation and management  9) Pt will demonstrate 100% carryover of E C  techniques w/ mod I t/o fx'l I/ADL/leisure tasks w/o cues s/p skilled education  10) Pt will tolerate bed mobility and EOB seated tasks w/ min A for 20 mins to engage in fx'l I/ADL/leisure tasks w/ min A w/ min cues  11) Pt will demonstrate improved b/l UE strength by 1 MMT grade to enhance ADLS and functional transfers    Documentation completed by: Dinorah Smith MS, OTR/L

## 2019-07-02 NOTE — PHYSICAL THERAPY NOTE
PT EVALUATION  Time-In: 1150  Time-Out: 1213  Total Time: 23 minutes    89 y o     [id number]    Anxiety [F41 9]  Panic attack [F41 0]  New onset a-fib ([address] ) [I48 91]  Ambulatory dysfunction [R26 2]    Length of Stay: 3    Past Medical History:   Diagnosis Date    HTN (hypertension)          History reviewed  No pertinent surgical history  07/02/19 1213   Note Type   Note type Eval only   Pain Assessment   Pain Assessment No/denies pain   Pain Score No Pain   Home Living   Type of Home Apartment   Home Layout One level; Other (Comment)  (stair glide to enter apartment)   Bathroom Shower/Tub Tub/shower unit   Bathroom Toilet Standard   Bathroom Equipment Grab bars in shower; Shower chair;Commode   Bathroom Accessibility Accessible   Home Equipment Walker;Quad cane; Other (Comment)  (rollator)   Prior Function   Level of [place] Needs assistance with IADLs; Needs assistance with ADLs and functional mobility   Lives With Alone   Receives Help From Family   ADL Assistance Needs assistance  (assisted with dressing and showering)   IADLs Needs assistance  (assisted with meals)   Falls in the last 6 months 1 to 4   Vocational Retired   Comments pt's children check on her frequently; daughter reports patient to have increased anxiety and panic-like attacks with fear of falling since initial fall 6/6  daughter reports patient to become rigid and stiff like a "plank"   Restrictions/Precautions   Weight Bearing Precautions Per Order No   Other Precautions Fall Risk;Cognitive; Chair Alarm; Bed Alarm;Multiple lines;Telemetry  (high fear of falling)   General   Additional Pertinent History recent admission 6/7-6/11 at [facility] and discharged from [facility] 6/26   Family/Caregiver Present Yes  (daughter)   Cognition   Overall Cognitive Status Impaired   Arousal/Participation Alert   Attention Attends with cues to redirect   Orientation Level Oriented to person;Disoriented to place; Disoriented to time;Disoriented to situation Memory Decreased short term memory;Decreased recall of recent events;Decreased recall of precautions   Following Commands Follows one step commands with increased time or repetition   Comments Pt presents with increased anxiety and fear of falling  Demonstrates impaired insight and decreased safety awareness  Needs redirection and increased emotional support  RUE Assessment   RUE Assessment WFL  (see OT eval for details)   LUE Assessment   LUE Assessment WFL  (see OT eval for details)   RLE Assessment   RLE Assessment WFL  (3+/5)   LLE Assessment   LLE Assessment WFL  (3+/5)   Coordination   Movements are Fluid and Coordinated 0   Coordination and Movement Description pt very guarded and rigid; most likely 2* fear of falling and anxiety   Bed Mobility   Rolling R 3  Moderate assistance   Additional items Assist x 2;Bedrails; Increased time required;Verbal cues;LE management   Rolling L 3  Moderate assistance   Additional items Assist x 2;Bedrails; Increased time required;Verbal cues;LE management   Supine to Sit 3  Moderate assistance   Additional items Assist x 2; Increased time required;Verbal cues;LE management   Sit to Supine 2  Maximal assistance   Additional items Assist x 2; Increased time required;Verbal cues;LE management; Bedrails   Additional Comments increased encouragement and cueing especially with episodes of high anxiety and panic in regards to fear of falling   Transfers   Sit to Stand 2  Maximal assistance   Additional items Assist x 2; Increased time required;Verbal cues;Armrests   Stand to Sit 1  Dependent   Additional items Assist x 2; Increased time required;Verbal cues   Additional Comments Pt presented with increased fear of falling upon standing and patient became rigid and stiff  Pt presented in "plank-like" position and extension  Pt requiring full assistance to safely sit back down  Ambulation/Elevation   Gait pattern Not tested; Not appropriate   Balance   Static Sitting Poor +   Dynamic Sitting Poor   Static Standing Poor -   Dynamic Standing Poor -   Endurance Deficit   Endurance Deficit Yes   Endurance Deficit Description fear of falling, emotion and behavioral components   Activity Tolerance   Activity Tolerance Other (Comment)  (emotional and behavioral factors)   Medical Staff Made Aware SAMMY Tyler   Nurse Made Aware Eleonora Juan RN;   Assessment   Prognosis Fair   Problem List Decreased strength;Decreased endurance; Impaired balance;Decreased mobility; Decreased coordination;Decreased cognition;Decreased safety awareness; Impaired judgement   Assessment PT consult received and evaluation complete  Pt is 89 y o  Female admitted from home alone for ambulatory dysfucntion presenting to hospital w/ complaints of panic attacks/increased anxiety  Pt lives alone but children/family check on her frequently  Pt agreeable to participate w/ therapy and identified by 2 patient identifiers: name and birth date  Precautions include: chair alarm, bed alarm, cognitive deficits, fall risk, multiple lines and telemetry  Pt presents supine in bed w/ daughter present  Pt has orders for up with assistance  PTA pt ambulatory with RW, assisted ADLS, and assisted w/ IADLS, 2 falls  Pt presents w/ RLE MMT 3+/5, LLE MMT 3+/5, modAx2 rolling to the R and L in bed, supine>sit modAx2, sit>stand maxAx2, stand>sit dependent, sit>supine maxAx2, and no pain  Pt demonstrates increased anxiety and panic attack upon standing up and limits overall tolerance  Pt needing increased emotional support and encouragement  Pt has high fear of falling and feels that she is falling even when on supportive surface  At end of PT evaluation pt left supine in bed w/ daughter present, bed alarm armed, all needs in reach, call bell and phone in hand, and RN aware of patient status   Pt would benefit from continued skilled PT for deficits in strength, balance, locomotion, functional endurance, functional mobility, safety awareness with mobility and coordination At this time recommend d/c short term rehab History: co-morbidities, fall risk, lives alone, JUDITH, multiple lines, telemetry, assisted for ADLS, assisted for IADLS, recent admission and impaired cognition Exam: strength, balance, locomotion, functional endurance, functional mobility, safety awareness with mobility and coordination Barthel Index: 25 Modified [place]:4 Clinical: unstable/unpredictable: fall risk, emotional and behavioral factors, chair alarm, bed alarm, impaired cognition, decreased safety awareness, use of AD and 2 person assist Complexity: high   Barriers to Discharge Inaccessible home environment;Decreased caregiver support   Barriers to Discharge Comments ability for family to manage patient's care safely   Goals   Patient Goals none stated 2* impaired cognition   LTG Expiration Date 07/16/19   Long Term Goal #1 In 14 days pt will demonstrate: bed mobility minAx1 to decrease burden of care on caregiver, sit<>stand and functional transfers minAx2 RW to promote OOB mobility w/ decreased burden of care on caregiver, gait training 25ft minax2 w/ RW to promote ambulation around room with decreased burden of care on caregiver, improve BLE by 1/2 grade strength to optimize functional mobility, improve balance by 1 grade to decrease fall risk, improve activity tolerance to >30 minutes w/o rest to improve functional endurance for home, pt and family education on PT risk, role, benefits, POC, goals, and recommendations to optimize patient outcomes, patient functional, optimize LOS and promote discharge to least restrictive environment  Treatment Day 0   Plan   Treatment/Interventions Functional transfer training;LE strengthening/ROM; Therapeutic exercise; Endurance training;Patient/family training;Equipment eval/education; Bed mobility;Gait training;Spoke to nursing;OT;Family   PT Frequency Other (Comment)  (2-4x/wk)   Recommendation   Recommendation Other (Comment)  (STR)   Equipment Recommended Other (Comment)  (continue to assess/monitor)   PT - OK to Discharge Yes  (yes if to rehab; no to home)   Modified Ebonie Scale   Modified [place] Scale 4   Barthel Index   Feeding 5   Bathing 0   Grooming Score 0   Dressing Score 0   Bladder Score 5   Bowels Score 5   Toilet Use Score 5   Transfers (Bed/Chair) Score 5   Mobility (Level Surface) Score 0   Stairs Score 0   Barthel Index Score 25       Rowdy Live, PT ,DPT

## 2019-07-02 NOTE — PLAN OF CARE
Problem: OCCUPATIONAL THERAPY ADULT  Goal: Performs self-care activities at highest level of function for planned discharge setting  See evaluation for individualized goals  Description  Treatment Interventions: ADL retraining, Functional transfer training, UE strengthening/ROM, Patient/family training, Cognitive reorientation, Endurance training, Equipment evaluation/education, Compensatory technique education, Activityengagement, Energy conservation          See flowsheet documentation for full assessment, interventions and recommendations  Note:   Limitation: Decreased UE strength, Decreased ADL status, Decreased Safe judgement during ADL, Decreased endurance, Decreased cognition, Decreased sensation, Decreased high-level ADLs, Decreased self-care trans  Prognosis: Fair  Assessment: Pt is a 80 y o  female seen for OT evaluation s/p admit to [facility] on 6/29/2019 w/ Ambulatory dysfunction; anxiety and panic attacks  Comorbidities affecting pt's functional performance at time of assessment include: memory loss, HTN, a-fib, anxiety  Personal factors affecting pt at time of IE include: lives alone, impaired cognition  Prior to admission, pt was living w/ alone family comes t/o the day to assist patient and assist ADLs and provide meals; pt now requiring increased assist w/ transfers and mobility    Upon evaluation: Pt requires MOD assist x2 supine>sit bed mobility, MAX assist x2 sit>supine bed mobility w/ increased time to complete, MAX assist LB ADLs, MOD-MAX assist UB ADLs, MAX assist toileting, MAX assist x2 sit>stand w/ VCs for safety (increased fear of falling and anxiety)  2* the following deficits impacting occupational performance: impaired functional reach, decreased strength and endurance, impaired balance, impaired cognition, impaired activity tolerance, bradykinesia, flat affect, impaired insight and safety awareness, anxiety and fear of falling (pt yelling and shaky and required emotional support t/o), tremors  Pt to benefit from continued skilled OT tx while in the hospital to address deficits as defined above and maximize level of functional independence w ADL's and functional mobility  Occupational Performance areas to address include: grooming, bathing/shower, toilet hygiene, dressing, health maintenance, functional mobility, clothing management, cleaning and meal prep, formal cognitive assessment, safety education  From OT standpoint, recommendation at time of d/c would be short term rehab w/ potential LT placement       OT Discharge Recommendation: Short Term Rehab(w/ potential LT Placement)

## 2019-07-02 NOTE — ASSESSMENT & PLAN NOTE
Has had a few falls recently  Now paralyzingly anxious  Stiffens up and cannot move at times  She is doing a little better with psychiatric meds  Will need placement

## 2019-07-02 NOTE — PLAN OF CARE
Problem: Potential for Falls  Goal: Patient will remain free of falls  Description  INTERVENTIONS:  - Assess patient frequently for physical needs  -  Identify cognitive and physical deficits and behaviors that affect risk of falls    -  [place] fall precautions as indicated by assessment   - Educate patient/family on patient safety including physical limitations  - Instruct patient to call for assistance with activity based on assessment  - Modify environment to reduce risk of injury  - Consider OT/PT consult to assist with strengthening/mobility  Outcome: Progressing     Problem: PAIN - ADULT  Goal: Verbalizes/displays adequate comfort level or baseline comfort level  Description  Interventions:  - Encourage patient to monitor pain and request assistance  - Assess pain using appropriate pain scale  - Administer analgesics based on type and severity of pain and evaluate response  - Implement non-pharmacological measures as appropriate and evaluate response  - Consider cultural and social influences on pain and pain management  - Notify physician/advanced practitioner if interventions unsuccessful or patient reports new pain  Outcome: Progressing     Problem: SAFETY ADULT  Goal: Maintain or return to baseline ADL function  Description  INTERVENTIONS:  -  Assess patient's ability to carry out ADLs; assess patient's baseline for ADL function and identify physical deficits which impact ability to perform ADLs (bathing, care of mouth/teeth, toileting, grooming, dressing, etc )  - Assess/evaluate cause of self-care deficits   - Assess range of motion  - Assess patient's mobility; develop plan if impaired  - Assess patient's need for assistive devices and provide as appropriate  - Encourage maximum independence but intervene and supervise when necessary  ¯ Involve family in performance of ADLs  ¯ Assess for home care needs following discharge   ¯ Request OT consult to assist with ADL evaluation and planning for discharge  ¯ Provide patient education as appropriate  Outcome: Progressing  Goal: Maintain or return mobility status to optimal level  Description  INTERVENTIONS:  - Assess patient's baseline mobility status (ambulation, transfers, stairs, etc )    - Identify cognitive and physical deficits and behaviors that affect mobility  - Identify mobility aids required to assist with transfers and/or ambulation (gait belt, sit-to-stand, lift, walker, cane, etc )  - [place] fall precautions as indicated by assessment  - Record patient progress and toleration of activity level on Mobility SBAR; progress patient to next Phase/Stage  - Instruct patient to call for assistance with activity based on assessment  - Request Rehabilitation consult to assist with strengthening/weightbearing, etc   Outcome: Progressing     Problem: DISCHARGE PLANNING  Goal: Discharge to home or other facility with appropriate resources  Description  INTERVENTIONS:  - Identify barriers to discharge w/patient and caregiver  - Arrange for needed discharge resources and transportation as appropriate  - Identify discharge learning needs (meds, wound care, etc )  - Arrange for interpretive services to assist at discharge as needed  - Refer to Case Management Department for coordinating discharge planning if the patient needs post-hospital services based on physician/advanced practitioner order or complex needs related to functional status, cognitive ability, or social support system  Outcome: Progressing     Problem: Knowledge Deficit  Goal: Patient/family/caregiver demonstrates understanding of disease process, treatment plan, medications, and discharge instructions  Description  Complete learning assessment and assess knowledge base    Interventions:  - Provide teaching at level of understanding  - Provide teaching via preferred learning methods  Outcome: Progressing     Problem: CARDIOVASCULAR - ADULT  Goal: Maintains optimal cardiac output and hemodynamic stability  Description  INTERVENTIONS:  - Monitor I/O, vital signs and rhythm  - Monitor for S/S and trends of decreased cardiac output i e  bleeding, hypotension  - Administer and titrate ordered vasoactive medications to optimize hemodynamic stability  - Assess quality of pulses, skin color and temperature  - Assess for signs of decreased coronary artery perfusion - ex   Angina  - Instruct patient to report change in severity of symptoms  Outcome: Progressing  Goal: Absence of cardiac dysrhythmias or at baseline rhythm  Description  INTERVENTIONS:  - Continuous cardiac monitoring, monitor vital signs, obtain 12 lead EKG if indicated  - Administer antiarrhythmic and heart rate control medications as ordered  - Monitor electrolytes and administer replacement therapy as ordered  Outcome: Progressing     Problem: MUSCULOSKELETAL - ADULT  Goal: Maintain or return mobility to safest level of function  Description  INTERVENTIONS:  - Assess patient's ability to carry out ADLs; assess patient's baseline for ADL function and identify physical deficits which impact ability to perform ADLs (bathing, care of mouth/teeth, toileting, grooming, dressing, etc )  - Assess/evaluate cause of self-care deficits   - Assess range of motion  - Assess patient's mobility; develop plan if impaired  - Assess patient's need for assistive devices and provide as appropriate  - Encourage maximum independence but intervene and supervise when necessary  - Involve family in performance of ADLs  - Assess for home care needs following discharge   - Request OT consult to assist with ADL evaluation and planning for discharge  - Provide patient education as appropriate  Outcome: Progressing  Goal: Maintain proper alignment of affected body part  Description  INTERVENTIONS:  - Support, maintain and protect limb and body alignment  - Provide pt/fam with appropriate education  Outcome: Progressing     Problem: Prexisting or High Potential for Compromised Skin Integrity  Goal: Skin integrity is maintained or improved  Description  INTERVENTIONS:  - Identify patients at risk for skin breakdown  - Assess and monitor skin integrity  - Assess and monitor nutrition and hydration status  - Monitor labs (i e  albumin)  - Assess for incontinence   - Turn and reposition patient  - Assist with mobility/ambulation  - Relieve pressure over bony prominences  - Avoid friction and shearing  - Provide appropriate hygiene as needed including keeping skin clean and dry  - Evaluate need for skin moisturizer/barrier cream  - Collaborate with interdisciplinary team (i e  Nutrition, Rehabilitation, etc )   - Patient/family teaching  Outcome: Progressing

## 2019-07-03 PROBLEM — G93.41 METABOLIC ENCEPHALOPATHY: Status: ACTIVE | Noted: 2019-07-03

## 2019-07-03 LAB — NT-PROBNP SERPL-MCNC: 1745 PG/ML

## 2019-07-03 PROCEDURE — 83880 ASSAY OF NATRIURETIC PEPTIDE: CPT | Performed by: INTERNAL MEDICINE

## 2019-07-03 PROCEDURE — 99232 SBSQ HOSP IP/OBS MODERATE 35: CPT | Performed by: INTERNAL MEDICINE

## 2019-07-03 PROCEDURE — 99232 SBSQ HOSP IP/OBS MODERATE 35: CPT | Performed by: HOSPITALIST

## 2019-07-03 RX ORDER — OLANZAPINE 5 MG/1
5 TABLET ORAL
Status: DISCONTINUED | OUTPATIENT
Start: 2019-07-03 | End: 2019-07-10 | Stop reason: HOSPADM

## 2019-07-03 RX ADMIN — ESCITALOPRAM OXALATE 20 MG: 10 TABLET ORAL at 08:28

## 2019-07-03 RX ADMIN — ASPIRIN 81 MG 81 MG: 81 TABLET ORAL at 08:28

## 2019-07-03 RX ADMIN — ALPRAZOLAM 0.25 MG: 0.25 TABLET ORAL at 06:14

## 2019-07-03 RX ADMIN — METOPROLOL TARTRATE 25 MG: 25 TABLET, FILM COATED ORAL at 22:00

## 2019-07-03 RX ADMIN — OLANZAPINE 5 MG: 5 TABLET, FILM COATED ORAL at 22:00

## 2019-07-03 RX ADMIN — MELATONIN TAB 3 MG 6 MG: 3 TAB at 22:01

## 2019-07-03 RX ADMIN — MECLIZINE HYDROCHLORIDE 25 MG: 25 TABLET ORAL at 22:01

## 2019-07-03 RX ADMIN — DIAZEPAM 2.5 MG: 5 INJECTION, SOLUTION INTRAMUSCULAR; INTRAVENOUS at 10:23

## 2019-07-03 RX ADMIN — METOPROLOL TARTRATE 25 MG: 25 TABLET, FILM COATED ORAL at 08:28

## 2019-07-03 RX ADMIN — ENOXAPARIN SODIUM 30 MG: 30 INJECTION SUBCUTANEOUS at 08:27

## 2019-07-03 NOTE — PLAN OF CARE
Problem: Potential for Falls  Goal: Patient will remain free of falls  Description  INTERVENTIONS:  - Assess patient frequently for physical needs  -  Identify cognitive and physical deficits and behaviors that affect risk of falls    -  [place] fall precautions as indicated by assessment   - Educate patient/family on patient safety including physical limitations  - Instruct patient to call for assistance with activity based on assessment  - Modify environment to reduce risk of injury  - Consider OT/PT consult to assist with strengthening/mobility  Outcome: Progressing     Problem: PAIN - ADULT  Goal: Verbalizes/displays adequate comfort level or baseline comfort level  Description  Interventions:  - Encourage patient to monitor pain and request assistance  - Assess pain using appropriate pain scale  - Administer analgesics based on type and severity of pain and evaluate response  - Implement non-pharmacological measures as appropriate and evaluate response  - Consider cultural and social influences on pain and pain management  - Notify physician/advanced practitioner if interventions unsuccessful or patient reports new pain  Outcome: Progressing     Problem: SAFETY ADULT  Goal: Maintain or return to baseline ADL function  Description  INTERVENTIONS:  -  Assess patient's ability to carry out ADLs; assess patient's baseline for ADL function and identify physical deficits which impact ability to perform ADLs (bathing, care of mouth/teeth, toileting, grooming, dressing, etc )  - Assess/evaluate cause of self-care deficits   - Assess range of motion  - Assess patient's mobility; develop plan if impaired  - Assess patient's need for assistive devices and provide as appropriate  - Encourage maximum independence but intervene and supervise when necessary  ¯ Involve family in performance of ADLs  ¯ Assess for home care needs following discharge   ¯ Request OT consult to assist with ADL evaluation and planning for discharge  ¯ Provide patient education as appropriate  Outcome: Progressing  Goal: Maintain or return mobility status to optimal level  Description  INTERVENTIONS:  - Assess patient's baseline mobility status (ambulation, transfers, stairs, etc )    - Identify cognitive and physical deficits and behaviors that affect mobility  - Identify mobility aids required to assist with transfers and/or ambulation (gait belt, sit-to-stand, lift, walker, cane, etc )  - [place] fall precautions as indicated by assessment  - Record patient progress and toleration of activity level on Mobility SBAR; progress patient to next Phase/Stage  - Instruct patient to call for assistance with activity based on assessment  - Request Rehabilitation consult to assist with strengthening/weightbearing, etc   Outcome: Progressing     Problem: DISCHARGE PLANNING  Goal: Discharge to home or other facility with appropriate resources  Description  INTERVENTIONS:  - Identify barriers to discharge w/patient and caregiver  - Arrange for needed discharge resources and transportation as appropriate  - Identify discharge learning needs (meds, wound care, etc )  - Arrange for interpretive services to assist at discharge as needed  - Refer to Case Management Department for coordinating discharge planning if the patient needs post-hospital services based on physician/advanced practitioner order or complex needs related to functional status, cognitive ability, or social support system  Outcome: Progressing     Problem: Knowledge Deficit  Goal: Patient/family/caregiver demonstrates understanding of disease process, treatment plan, medications, and discharge instructions  Description  Complete learning assessment and assess knowledge base    Interventions:  - Provide teaching at level of understanding  - Provide teaching via preferred learning methods  Outcome: Progressing     Problem: CARDIOVASCULAR - ADULT  Goal: Maintains optimal cardiac output and hemodynamic stability  Description  INTERVENTIONS:  - Monitor I/O, vital signs and rhythm  - Monitor for S/S and trends of decreased cardiac output i e  bleeding, hypotension  - Administer and titrate ordered vasoactive medications to optimize hemodynamic stability  - Assess quality of pulses, skin color and temperature  - Assess for signs of decreased coronary artery perfusion - ex   Angina  - Instruct patient to report change in severity of symptoms  Outcome: Progressing  Goal: Absence of cardiac dysrhythmias or at baseline rhythm  Description  INTERVENTIONS:  - Continuous cardiac monitoring, monitor vital signs, obtain 12 lead EKG if indicated  - Administer antiarrhythmic and heart rate control medications as ordered  - Monitor electrolytes and administer replacement therapy as ordered  Outcome: Progressing     Problem: MUSCULOSKELETAL - ADULT  Goal: Maintain or return mobility to safest level of function  Description  INTERVENTIONS:  - Assess patient's ability to carry out ADLs; assess patient's baseline for ADL function and identify physical deficits which impact ability to perform ADLs (bathing, care of mouth/teeth, toileting, grooming, dressing, etc )  - Assess/evaluate cause of self-care deficits   - Assess range of motion  - Assess patient's mobility; develop plan if impaired  - Assess patient's need for assistive devices and provide as appropriate  - Encourage maximum independence but intervene and supervise when necessary  - Involve family in performance of ADLs  - Assess for home care needs following discharge   - Request OT consult to assist with ADL evaluation and planning for discharge  - Provide patient education as appropriate  Outcome: Progressing  Goal: Maintain proper alignment of affected body part  Description  INTERVENTIONS:  - Support, maintain and protect limb and body alignment  - Provide pt/fam with appropriate education  Outcome: Progressing     Problem: Prexisting or High Potential for Compromised Skin Integrity  Goal: Skin integrity is maintained or improved  Description  INTERVENTIONS:  - Identify patients at risk for skin breakdown  - Assess and monitor skin integrity  - Assess and monitor nutrition and hydration status  - Monitor labs (i e  albumin)  - Assess for incontinence   - Turn and reposition patient  - Assist with mobility/ambulation  - Relieve pressure over bony prominences  - Avoid friction and shearing  - Provide appropriate hygiene as needed including keeping skin clean and dry  - Evaluate need for skin moisturizer/barrier cream  - Collaborate with interdisciplinary team (i e  Nutrition, Rehabilitation, etc )   - Patient/family teaching  Outcome: Progressing

## 2019-07-03 NOTE — SOCIAL WORK
LOS: 4 GMLOS: 3  PATIENT IS A READMISSION  NOT A BUNDLE    Dtr, Jessica Mayfield, called and provided Son as a choice since pt has been there before and they are comfortable with her being there, and the facility is centrally located to all her children for visiting purposes  Sent to only Son  They are aware of the possibility of pt becoming LTC, so they will reach out to the family for an application       MU Lynch  7/3/2019  13:45

## 2019-07-03 NOTE — ASSESSMENT & PLAN NOTE
Spoke with psych  Patient had some hallucinations this am, seeing people who  years ago  Valium has helped  Psych is trying an increased dose of Zyprexa tonight

## 2019-07-03 NOTE — ASSESSMENT & PLAN NOTE
Has had a few falls recently  Now paralyzingly anxious  Stiffens up and cannot move at times  Likely this is dementia, post traumatic stress and change in environment  Appreciate psych help

## 2019-07-03 NOTE — PROGRESS NOTES
Progress Note - Cardiology   Gennaro Jeffers 80 y o  female MRN: [id number]  Unit/Bed#: [place] [address] -01 Encounter: [id number]      Assessment:     1  Status post fall  2  [place] atrial fibrillation   3  Acute diastolic/valvular heart failure with at least moderate to severe mitral regurgitation and possible flail posterior mitral segment and moderate tricuspid regurgitation  4  Small pericardial effusion  5  Hypertension    Discussion/Recommendations:    Looks euvolemic on examination  Would switch to p o  Diuretics  Would confirm that renal function is stable then add 20 Lasix p o  Daily  Will see as needed  Subjective:  No chest pain or trouble breathing      Physical Exam:  GEN:  NAD  HEENT:  MMM, NCAT, pink conjunctiva, EOMI, nonicteric sclera  CV:  NO JVD/HJR, irregular, 3/6 holosystolic murmur, +H6/I9, NO PARASTERNAL HEAVE/THRILL, NO LE EDEMA, NO HEPATIC SYSTOLIC PULSATION, WARM EXTREMITIES  RESP:  CTAB/L  ABD:  SOFT, NT, NO GROSS ORGANOMEGALY        Vitals:   /97 (BP Location: Left arm)   Pulse 88   Temp 98 9 °F (37 2 °C) (Temporal)   Resp 20   SpO2 95%   There were no vitals filed for this visit      Intake/Output Summary (Last 24 hours) at 7/3/2019 0756  Last data filed at 7/3/2019 0601  Gross per 24 hour   Intake    Output 1325 ml   Net -1325 ml         TELEMETRY:  Atrial fibrillation-predominantly rate controlled  Lab Results:  Results from last 7 days   Lab Units 07/01/19  0535   WBC Thousand/uL 5 37   HEMOGLOBIN g/dL 12 2   HEMATOCRIT % 38 6   PLATELETS Thousands/uL 183     Results from last 7 days   Lab Units 07/01/19  0535   POTASSIUM mmol/L 4 1   CHLORIDE mmol/L 107   CO2 mmol/L 30   BUN mg/dL 17   CREATININE mg/dL 0 84   CALCIUM mg/dL 9 1   ALK PHOS U/L 64   ALT U/L 111*   AST U/L 85*     Results from last 7 days   Lab Units 07/01/19  0535   POTASSIUM mmol/L 4 1   CHLORIDE mmol/L 107   CO2 mmol/L 30   BUN mg/dL 17   CREATININE mg/dL 0 84   CALCIUM mg/dL 9 1 Medications:    Current Facility-Administered Medications:     acetaminophen (TYLENOL) tablet 650 mg, 650 mg, Oral, Q6H PRN, Kyleigh Parsons PA-C, 650 mg at 06/29/19 2214    ALPRAZolam (XANAX) tablet 0 25 mg, 0 25 mg, Oral, 4x Daily PRN, Kyleigh Parsons, PA-C, 0 25 mg at 07/03/19 0614    aspirin chewable tablet 81 mg, 81 mg, Oral, Daily, Tracy Velarde PA-C, 81 mg at 07/02/19 1224    diazepam (VALIUM) injection 2 5 mg, 2 5 mg, Intravenous, Q8H PRN, Jade Shields, DO, 2 5 mg at 07/01/19 0417    enoxaparin (LOVENOX) subcutaneous injection 30 mg, 30 mg, Subcutaneous, Q24H [address], Tracy Velarde, PA-C, 30 mg at 07/02/19 4997    escitalopram (LEXAPRO) tablet 20 mg, 20 mg, Oral, Daily, Tracy Velarde, PA-C, 20 mg at 07/02/19 8532    furosemide (LASIX) injection 20 mg, 20 mg, Intravenous, Daily, Rujul Melo, DO    meclizine (ANTIVERT) tablet 25 mg, 25 mg, Oral, Q8H PRN, Tracy Velarde PA-C, 25 mg at 07/01/19 0859    melatonin tablet 6 mg, 6 mg, Oral, HS, Tracy Patelr, PA-C, 6 mg at 07/02/19 2100    metoprolol tartrate (LOPRESSOR) tablet 25 mg, 25 mg, Oral, Q12H [address], Rujul Melo, DO, 25 mg at 07/02/19 2056    OLANZapine (ZyPREXA) tablet 2 5 mg, 2 5 mg, Oral, HS, RANJANA kAers, 2 5 mg at 07/02/19 2100    ondansetron (ZOFRAN) injection 4 mg, 4 mg, Intravenous, Q6H PRN, Kyleigh Parsons PA-C    Portions of the record may have been created with voice recognition software  Occasional wrong word or "sound a like" substitutions may have occurred due to the inherent limitations of voice recognition software  Read the chart carefully and recognize, using context, where substitutions have occurred

## 2019-07-03 NOTE — PROGRESS NOTES
Progress Note - Kaila Paul 1923, 80 y o  female MRN: [id number]    Unit/Bed#: [place] [address] -01 Encounter: [id number]    Primary Care Provider: Bubba Dietz DO   Date and time admitted to hospital: 2019 12:47 PM        * Ambulatory dysfunction  Assessment & Plan  Has had a few falls recently  Now paralyzingly anxious  Stiffens up and cannot move at times  Likely this is dementia, post traumatic stress and change in environment  Appreciate psych help    Metabolic encephalopathy  Assessment & Plan  Having hallucinations  Multifactorial:   Dementia, change in environment, anxiety, medications    Anxiety  Assessment & Plan  Spoke with psych  Patient had some hallucinations this am, seeing people who  years ago  Valium has helped  Psych is trying an increased dose of Zyprexa tonight  Subjective:   Patient was having hallucinations, seeing people who had  years ago this am   She was very anxious  She did not sleep much last night  She is sleeping now after receiving Valium  No improvement with Xanax  Objective:     Vitals:   Temp (24hrs), Av 5 °F (36 9 °C), Min:97 8 °F (36 6 °C), Max:98 9 °F (37 2 °C)    Temp:  [97 8 °F (36 6 °C)-98 9 °F (37 2 °C)] 97 8 °F (36 6 °C)  HR:  [61-88] 61  Resp:  [18-22] 18  BP: (130-133)/(79-97) 133/96  SpO2:  [95 %-99 %] 99 %  There is no height or weight on file to calculate BMI  Input and Output Summary (last 24 hours): Intake/Output Summary (Last 24 hours) at 7/3/2019 1550  Last data filed at 7/3/2019 1101  Gross per 24 hour   Intake    Output 1075 ml   Net -1075 ml       Physical Exam:     Physical Exam   Constitutional:   Sleeping comfortably right now   HENT:   Head: Normocephalic and atraumatic  Eyes: Pupils are equal, round, and reactive to light  EOM are normal    Cardiovascular: Normal rate and regular rhythm  Exam reveals no gallop and no friction rub  No murmur heard    Pulmonary/Chest: Effort normal and breath sounds normal  She has no wheezes  She has no rales  Abdominal: Soft  Bowel sounds are normal  There is no tenderness  Musculoskeletal: She exhibits no edema  Nursing note and vitals reviewed          Additional Data:     Labs:    Results from last 7 days   Lab Units 07/01/19  0535   WBC Thousand/uL 5 37   HEMOGLOBIN g/dL 12 2   HEMATOCRIT % 38 6   PLATELETS Thousands/uL 183   NEUTROS PCT % 77*   LYMPHS PCT % 15   MONOS PCT % 7   EOS PCT % 1     Results from last 7 days   Lab Units 07/01/19  0535   POTASSIUM mmol/L 4 1   CHLORIDE mmol/L 107   CO2 mmol/L 30   BUN mg/dL 17   CREATININE mg/dL 0 84   CALCIUM mg/dL 9 1   ALK PHOS U/L 64   ALT U/L 111*   AST U/L 85*     Results from last 7 days   Lab Units 07/01/19  0535   INR  1 10                   * I Have Reviewed All Lab Data     Recent Cultures (last 7 days):             Last 24 Hours Medication List:     Current Facility-Administered Medications:  acetaminophen 650 mg Oral Q6H PRN Tracy Piger, PA-C   ALPRAZolam 0 25 mg Oral 4x Daily PRN Flower Poot, PA-C   aspirin 81 mg Oral Daily Tracy Piger, PA-C   diazepam 2 5 mg Intravenous Q8H PRN Cherylesusanna Martin, DO   enoxaparin 30 mg Subcutaneous Q24H [address] Tracy Piger, PA-C   escitalopram 20 mg Oral Daily Tracy Piger, PA-C   meclizine 25 mg Oral Q8H PRN Tracy Piger, PA-C   melatonin 6 mg Oral HS Tracy Piger, PA-C   metoprolol tartrate 25 mg Oral Q12H [address] Rujul Melo, DO   OLANZapine 5 mg Oral HS RANJANA Akers   ondansetron 4 mg Intravenous Q6H PRN Flower Poot, PA-C         VTE Pharmacologic Prophylaxis:   Pharmacologic: Enoxaparin (Lovenox)      Current Length of Stay: 4 day(s)    Current Patient Status: Inpatient       Discharge Plan: will need long term placement    Code Status: Level 1 - Full Code           Today, Patient Was Seen By: Radha Webber DO    ** Please Note: Dictation voice to text software may have been used in the creation of this document   **

## 2019-07-04 PROCEDURE — 99232 SBSQ HOSP IP/OBS MODERATE 35: CPT | Performed by: HOSPITALIST

## 2019-07-04 RX ORDER — POLYETHYLENE GLYCOL 3350 17 G/17G
17 POWDER, FOR SOLUTION ORAL DAILY
Status: DISCONTINUED | OUTPATIENT
Start: 2019-07-05 | End: 2019-07-10 | Stop reason: HOSPADM

## 2019-07-04 RX ADMIN — METOPROLOL TARTRATE 25 MG: 25 TABLET, FILM COATED ORAL at 23:37

## 2019-07-04 RX ADMIN — BISACODYL 10 MG: 5 TABLET, COATED ORAL at 23:38

## 2019-07-04 RX ADMIN — OLANZAPINE 5 MG: 5 TABLET, FILM COATED ORAL at 23:38

## 2019-07-04 RX ADMIN — ASPIRIN 81 MG 81 MG: 81 TABLET ORAL at 08:53

## 2019-07-04 RX ADMIN — ENOXAPARIN SODIUM 30 MG: 30 INJECTION SUBCUTANEOUS at 08:53

## 2019-07-04 RX ADMIN — ESCITALOPRAM OXALATE 20 MG: 10 TABLET ORAL at 08:53

## 2019-07-04 RX ADMIN — MELATONIN TAB 3 MG 6 MG: 3 TAB at 23:38

## 2019-07-04 RX ADMIN — METOPROLOL TARTRATE 25 MG: 25 TABLET, FILM COATED ORAL at 08:53

## 2019-07-04 NOTE — PLAN OF CARE
Problem: Potential for Falls  Goal: Patient will remain free of falls  Description  INTERVENTIONS:  - Assess patient frequently for physical needs  -  Identify cognitive and physical deficits and behaviors that affect risk of falls    -  [place] fall precautions as indicated by assessment   - Educate patient/family on patient safety including physical limitations  - Instruct patient to call for assistance with activity based on assessment  - Modify environment to reduce risk of injury  - Consider OT/PT consult to assist with strengthening/mobility  Outcome: Progressing     Problem: PAIN - ADULT  Goal: Verbalizes/displays adequate comfort level or baseline comfort level  Description  Interventions:  - Encourage patient to monitor pain and request assistance  - Assess pain using appropriate pain scale  - Administer analgesics based on type and severity of pain and evaluate response  - Implement non-pharmacological measures as appropriate and evaluate response  - Consider cultural and social influences on pain and pain management  - Notify physician/advanced practitioner if interventions unsuccessful or patient reports new pain  Outcome: Progressing     Problem: SAFETY ADULT  Goal: Maintain or return to baseline ADL function  Description  INTERVENTIONS:  -  Assess patient's ability to carry out ADLs; assess patient's baseline for ADL function and identify physical deficits which impact ability to perform ADLs (bathing, care of mouth/teeth, toileting, grooming, dressing, etc )  - Assess/evaluate cause of self-care deficits   - Assess range of motion  - Assess patient's mobility; develop plan if impaired  - Assess patient's need for assistive devices and provide as appropriate  - Encourage maximum independence but intervene and supervise when necessary  ¯ Involve family in performance of ADLs  ¯ Assess for home care needs following discharge   ¯ Request OT consult to assist with ADL evaluation and planning for discharge  ¯ Provide patient education as appropriate  Outcome: Progressing  Goal: Maintain or return mobility status to optimal level  Description  INTERVENTIONS:  - Assess patient's baseline mobility status (ambulation, transfers, stairs, etc )    - Identify cognitive and physical deficits and behaviors that affect mobility  - Identify mobility aids required to assist with transfers and/or ambulation (gait belt, sit-to-stand, lift, walker, cane, etc )  - [place] fall precautions as indicated by assessment  - Record patient progress and toleration of activity level on Mobility SBAR; progress patient to next Phase/Stage  - Instruct patient to call for assistance with activity based on assessment  - Request Rehabilitation consult to assist with strengthening/weightbearing, etc   Outcome: Progressing     Problem: DISCHARGE PLANNING  Goal: Discharge to home or other facility with appropriate resources  Description  INTERVENTIONS:  - Identify barriers to discharge w/patient and caregiver  - Arrange for needed discharge resources and transportation as appropriate  - Identify discharge learning needs (meds, wound care, etc )  - Arrange for interpretive services to assist at discharge as needed  - Refer to Case Management Department for coordinating discharge planning if the patient needs post-hospital services based on physician/advanced practitioner order or complex needs related to functional status, cognitive ability, or social support system  Outcome: Progressing     Problem: Knowledge Deficit  Goal: Patient/family/caregiver demonstrates understanding of disease process, treatment plan, medications, and discharge instructions  Description  Complete learning assessment and assess knowledge base    Interventions:  - Provide teaching at level of understanding  - Provide teaching via preferred learning methods  Outcome: Progressing     Problem: CARDIOVASCULAR - ADULT  Goal: Maintains optimal cardiac output and hemodynamic stability  Description  INTERVENTIONS:  - Monitor I/O, vital signs and rhythm  - Monitor for S/S and trends of decreased cardiac output i e  bleeding, hypotension  - Administer and titrate ordered vasoactive medications to optimize hemodynamic stability  - Assess quality of pulses, skin color and temperature  - Assess for signs of decreased coronary artery perfusion - ex   Angina  - Instruct patient to report change in severity of symptoms  Outcome: Progressing  Goal: Absence of cardiac dysrhythmias or at baseline rhythm  Description  INTERVENTIONS:  - Continuous cardiac monitoring, monitor vital signs, obtain 12 lead EKG if indicated  - Administer antiarrhythmic and heart rate control medications as ordered  - Monitor electrolytes and administer replacement therapy as ordered  Outcome: Progressing     Problem: MUSCULOSKELETAL - ADULT  Goal: Maintain or return mobility to safest level of function  Description  INTERVENTIONS:  - Assess patient's ability to carry out ADLs; assess patient's baseline for ADL function and identify physical deficits which impact ability to perform ADLs (bathing, care of mouth/teeth, toileting, grooming, dressing, etc )  - Assess/evaluate cause of self-care deficits   - Assess range of motion  - Assess patient's mobility; develop plan if impaired  - Assess patient's need for assistive devices and provide as appropriate  - Encourage maximum independence but intervene and supervise when necessary  - Involve family in performance of ADLs  - Assess for home care needs following discharge   - Request OT consult to assist with ADL evaluation and planning for discharge  - Provide patient education as appropriate  Outcome: Progressing  Goal: Maintain proper alignment of affected body part  Description  INTERVENTIONS:  - Support, maintain and protect limb and body alignment  - Provide pt/fam with appropriate education  Outcome: Progressing     Problem: Prexisting or High Potential for Compromised Skin Integrity  Goal: Skin integrity is maintained or improved  Description  INTERVENTIONS:  - Identify patients at risk for skin breakdown  - Assess and monitor skin integrity  - Assess and monitor nutrition and hydration status  - Monitor labs (i e  albumin)  - Assess for incontinence   - Turn and reposition patient  - Assist with mobility/ambulation  - Relieve pressure over bony prominences  - Avoid friction and shearing  - Provide appropriate hygiene as needed including keeping skin clean and dry  - Evaluate need for skin moisturizer/barrier cream  - Collaborate with interdisciplinary team (i e  Nutrition, Rehabilitation, etc )   - Patient/family teaching  Outcome: Progressing

## 2019-07-04 NOTE — PLAN OF CARE
Problem: Potential for Falls  Goal: Patient will remain free of falls  Description  INTERVENTIONS:  - Assess patient frequently for physical needs  -  Identify cognitive and physical deficits and behaviors that affect risk of falls    -  [place] fall precautions as indicated by assessment   - Educate patient/family on patient safety including physical limitations  - Instruct patient to call for assistance with activity based on assessment  - Modify environment to reduce risk of injury  - Consider OT/PT consult to assist with strengthening/mobility  Outcome: Progressing     Problem: PAIN - ADULT  Goal: Verbalizes/displays adequate comfort level or baseline comfort level  Description  Interventions:  - Encourage patient to monitor pain and request assistance  - Assess pain using appropriate pain scale  - Administer analgesics based on type and severity of pain and evaluate response  - Implement non-pharmacological measures as appropriate and evaluate response  - Consider cultural and social influences on pain and pain management  - Notify physician/advanced practitioner if interventions unsuccessful or patient reports new pain  Outcome: Progressing     Problem: SAFETY ADULT  Goal: Maintain or return to baseline ADL function  Description  INTERVENTIONS:  -  Assess patient's ability to carry out ADLs; assess patient's baseline for ADL function and identify physical deficits which impact ability to perform ADLs (bathing, care of mouth/teeth, toileting, grooming, dressing, etc )  - Assess/evaluate cause of self-care deficits   - Assess range of motion  - Assess patient's mobility; develop plan if impaired  - Assess patient's need for assistive devices and provide as appropriate  - Encourage maximum independence but intervene and supervise when necessary  ¯ Involve family in performance of ADLs  ¯ Assess for home care needs following discharge   ¯ Request OT consult to assist with ADL evaluation and planning for discharge  ¯ Provide patient education as appropriate  Outcome: Progressing  Goal: Maintain or return mobility status to optimal level  Description  INTERVENTIONS:  - Assess patient's baseline mobility status (ambulation, transfers, stairs, etc )    - Identify cognitive and physical deficits and behaviors that affect mobility  - Identify mobility aids required to assist with transfers and/or ambulation (gait belt, sit-to-stand, lift, walker, cane, etc )  - [place] fall precautions as indicated by assessment  - Record patient progress and toleration of activity level on Mobility SBAR; progress patient to next Phase/Stage  - Instruct patient to call for assistance with activity based on assessment  - Request Rehabilitation consult to assist with strengthening/weightbearing, etc   Outcome: Progressing     Problem: DISCHARGE PLANNING  Goal: Discharge to home or other facility with appropriate resources  Description  INTERVENTIONS:  - Identify barriers to discharge w/patient and caregiver  - Arrange for needed discharge resources and transportation as appropriate  - Identify discharge learning needs (meds, wound care, etc )  - Arrange for interpretive services to assist at discharge as needed  - Refer to Case Management Department for coordinating discharge planning if the patient needs post-hospital services based on physician/advanced practitioner order or complex needs related to functional status, cognitive ability, or social support system  Outcome: Progressing     Problem: Knowledge Deficit  Goal: Patient/family/caregiver demonstrates understanding of disease process, treatment plan, medications, and discharge instructions  Description  Complete learning assessment and assess knowledge base    Interventions:  - Provide teaching at level of understanding  - Provide teaching via preferred learning methods  Outcome: Progressing     Problem: CARDIOVASCULAR - ADULT  Goal: Maintains optimal cardiac output and hemodynamic stability  Description  INTERVENTIONS:  - Monitor I/O, vital signs and rhythm  - Monitor for S/S and trends of decreased cardiac output i e  bleeding, hypotension  - Administer and titrate ordered vasoactive medications to optimize hemodynamic stability  - Assess quality of pulses, skin color and temperature  - Assess for signs of decreased coronary artery perfusion - ex   Angina  - Instruct patient to report change in severity of symptoms  Outcome: Progressing  Goal: Absence of cardiac dysrhythmias or at baseline rhythm  Description  INTERVENTIONS:  - Continuous cardiac monitoring, monitor vital signs, obtain 12 lead EKG if indicated  - Administer antiarrhythmic and heart rate control medications as ordered  - Monitor electrolytes and administer replacement therapy as ordered  Outcome: Progressing     Problem: MUSCULOSKELETAL - ADULT  Goal: Maintain or return mobility to safest level of function  Description  INTERVENTIONS:  - Assess patient's ability to carry out ADLs; assess patient's baseline for ADL function and identify physical deficits which impact ability to perform ADLs (bathing, care of mouth/teeth, toileting, grooming, dressing, etc )  - Assess/evaluate cause of self-care deficits   - Assess range of motion  - Assess patient's mobility; develop plan if impaired  - Assess patient's need for assistive devices and provide as appropriate  - Encourage maximum independence but intervene and supervise when necessary  - Involve family in performance of ADLs  - Assess for home care needs following discharge   - Request OT consult to assist with ADL evaluation and planning for discharge  - Provide patient education as appropriate  Outcome: Progressing  Goal: Maintain proper alignment of affected body part  Description  INTERVENTIONS:  - Support, maintain and protect limb and body alignment  - Provide pt/fam with appropriate education  Outcome: Progressing     Problem: Prexisting or High Potential for Compromised Skin Integrity  Goal: Skin integrity is maintained or improved  Description  INTERVENTIONS:  - Identify patients at risk for skin breakdown  - Assess and monitor skin integrity  - Assess and monitor nutrition and hydration status  - Monitor labs (i e  albumin)  - Assess for incontinence   - Turn and reposition patient  - Assist with mobility/ambulation  - Relieve pressure over bony prominences  - Avoid friction and shearing  - Provide appropriate hygiene as needed including keeping skin clean and dry  - Evaluate need for skin moisturizer/barrier cream  - Collaborate with interdisciplinary team (i e  Nutrition, Rehabilitation, etc )   - Patient/family teaching  Outcome: Progressing

## 2019-07-04 NOTE — ASSESSMENT & PLAN NOTE
She is doing a little better  Less anxious  Likely this is dementia, post traumatic stress and change in environment  Appreciate psych help

## 2019-07-04 NOTE — PROGRESS NOTES
Progress Note - Ronn Mcconnell 1923, 80 y o  female MRN: [id number]    Unit/Bed#: [facility] 2 -01 Encounter: [id number]    Primary Care Provider: Ming Crowder DO   Date and time admitted to hospital: 2019 12:47 PM        * Ambulatory dysfunction  Assessment & Plan  She is doing a little better  Less anxious  Likely this is dementia, post traumatic stress and change in environment  Appreciate psych help    Anxiety  Assessment & Plan  Valium has helped  Psych is trying an increased dose of Zyprexa          Subjective:   A little less anxious today  No hallucinations this afternoon      Objective:     Vitals:   Temp (24hrs), Av 8 °F (36 6 °C), Min:97 2 °F (36 2 °C), Max:98 2 °F (36 8 °C)    Temp:  [97 2 °F (36 2 °C)-98 2 °F (36 8 °C)] 98 °F (36 7 °C)  HR:  [] 70  Resp:  [16-20] 20  BP: ()/(73-99) 157/98  SpO2:  [94 %] 94 %  There is no height or weight on file to calculate BMI  Input and Output Summary (last 24 hours): Intake/Output Summary (Last 24 hours) at 2019 1607  Last data filed at 2019 0901  Gross per 24 hour   Intake    Output 550 ml   Net -550 ml       Physical Exam:     Physical Exam   HENT:   Head: Normocephalic and atraumatic  Eyes: Pupils are equal, round, and reactive to light  EOM are normal    Cardiovascular: Normal rate and regular rhythm  Exam reveals no gallop and no friction rub  No murmur heard  Pulmonary/Chest: Effort normal and breath sounds normal  She has no wheezes  She has no rales  Abdominal: Soft  Bowel sounds are normal  There is no tenderness  Musculoskeletal: She exhibits no edema  Nursing note and vitals reviewed              Additional Data:     Labs:    Results from last 7 days   Lab Units 19  0535   WBC Thousand/uL 5 37   HEMOGLOBIN g/dL 12 2   HEMATOCRIT % 38 6   PLATELETS Thousands/uL 183   NEUTROS PCT % 77*   LYMPHS PCT % 15   MONOS PCT % 7   EOS PCT % 1     Results from last 7 days   Lab Units 19  0535 POTASSIUM mmol/L 4 1   CHLORIDE mmol/L 107   CO2 mmol/L 30   BUN mg/dL 17   CREATININE mg/dL 0 84   CALCIUM mg/dL 9 1   ALK PHOS U/L 64   ALT U/L 111*   AST U/L 85*     Results from last 7 days   Lab Units 07/01/19  0535   INR  1 10                   * I Have Reviewed All Lab Data     Recent Cultures (last 7 days):             Last 24 Hours Medication List:     Current Facility-Administered Medications:  acetaminophen 650 mg Oral Q6H PRN Michell Wisdom PA-C   aspirin 81 mg Oral Daily Tracy Piger, YEN   diazepam 2 5 mg Intravenous Q8H PRN Eric Cardenas DO   enoxaparin 30 mg Subcutaneous Q24H [address] Tracy Piger, YEN   escitalopram 20 mg Oral Daily Tracy Piger, YEN   meclizine 25 mg Oral Q8H PRN Tracy Jorger, YEN   melatonin 6 mg Oral HS Tracy Patelr, YEN   metoprolol tartrate 25 mg Oral Q12H [address] David Melo DO   OLANZapine 5 mg Oral HS RANJANA Akers   ondansetron 4 mg Intravenous Q6H PRN Michell Wisdom PA-C         VTE Pharmacologic Prophylaxis:   Pharmacologic: Enoxaparin (Lovenox)      Current Length of Stay: 5 day(s)    Current Patient Status: Inpatient       Discharge Plan:   Code Status: Level 1 - Full Code           Today, Patient Was Seen By: Gisel Monique DO    ** Please Note: Dictation voice to text software may have been used in the creation of this document   **

## 2019-07-05 PROCEDURE — 97535 SELF CARE MNGMENT TRAINING: CPT

## 2019-07-05 PROCEDURE — 99232 SBSQ HOSP IP/OBS MODERATE 35: CPT | Performed by: HOSPITALIST

## 2019-07-05 PROCEDURE — 97530 THERAPEUTIC ACTIVITIES: CPT

## 2019-07-05 PROCEDURE — 97110 THERAPEUTIC EXERCISES: CPT

## 2019-07-05 RX ADMIN — POLYETHYLENE GLYCOL 3350 17 G: 17 POWDER, FOR SOLUTION ORAL at 10:16

## 2019-07-05 RX ADMIN — ESCITALOPRAM OXALATE 20 MG: 10 TABLET ORAL at 10:16

## 2019-07-05 RX ADMIN — ASPIRIN 81 MG 81 MG: 81 TABLET ORAL at 10:17

## 2019-07-05 RX ADMIN — MELATONIN TAB 3 MG 6 MG: 3 TAB at 21:00

## 2019-07-05 RX ADMIN — OLANZAPINE 5 MG: 5 TABLET, FILM COATED ORAL at 21:00

## 2019-07-05 RX ADMIN — METOPROLOL TARTRATE 25 MG: 25 TABLET, FILM COATED ORAL at 20:59

## 2019-07-05 RX ADMIN — METOPROLOL TARTRATE 25 MG: 25 TABLET, FILM COATED ORAL at 10:17

## 2019-07-05 RX ADMIN — ENOXAPARIN SODIUM 30 MG: 30 INJECTION SUBCUTANEOUS at 10:17

## 2019-07-05 RX ADMIN — MECLIZINE HYDROCHLORIDE 25 MG: 25 TABLET ORAL at 00:36

## 2019-07-05 NOTE — SOCIAL WORK
LOS: 6 GMLOS: 4 2  PATIENT IS A READMISSION  NOT A BUNDLE    [facility] accepted patient for LTC  CM met with both dtr, Nahed Chou, and son, Hal Ballard, at bedside  Son went to [facility] on Wednesday, 7/3, to complete financial application for LTC  Since the next day was the holiday, 7/4, nothing was started at [facility] to confirm said application until today  Liaison will let CM know when the application has been approved  Patient will require a BLS transport d/t dementia & ambulatory dysfxn once medically cleared       MU Bentley  7/5/2019  14:53

## 2019-07-05 NOTE — OCCUPATIONAL THERAPY NOTE
OccupationalTherapy Progress Note     Patient Name: Lisa De Guzman  CHTAL'J Date: 7/5/2019  Problem List  Patient Active Problem List   Diagnosis    Age-related osteoporosis without current pathological fracture    Anxiety    Essential hypertension    Memory loss    Mixed hyperlipidemia    Medicare annual wellness visit, subsequent    Ambulatory dysfunction    Weakness    Murmur, cardiac    New onset a-fib ([address] )    Hypernatremia    Metabolic encephalopathy           07/05/19 1128   Restrictions/Precautions   Weight Bearing Precautions Per Order No   Other Precautions Cognitive; Chair Alarm; Bed Alarm; Fall Risk;Multiple lines;Telemetry  (fear of falling)   General   Response to Previous Treatment Patient with no complaints from previous session   Lifestyle   Autonomy per pt daughter reports pt increased assist w/ ADLs, assist w/ functional transfers and mobility w/ RW, assist w/ IADLs, assist transportation   Reciprocal Relationships family; children and grandkids   Service to Others retired    Intrinsic Gratification listening to music and arranging artifical flowers   Pain Assessment   Pain Assessment FLACC   Pain Rating: FLACC (Rest) - Face 0   Pain Rating: FLACC (Rest) - Legs 0   Pain Rating: FLACC (Rest) - Activity 0   Pain Rating: FLACC (Rest) - Cry 0   Pain Rating: FLACC (Rest) - Consolability 0   Score: FLACC (Rest) 0   Pain Rating: FLACC (Activity) - Face 1   Pain Rating: FLACC (Activity) - Legs 0   Pain Rating: FLACC (Activity) - Activity 1   Pain Rating: FLACC (Activity) - Cry 1   Pain Rating: FLACC (Activity) - Consolability 1   Score: FLACC (Activity) 4   ADL   Where Assessed Supine, bed   Grooming Assistance 4  Minimal Assistance   Grooming Deficit Setup;Verbal cueing;Supervision/safety; Increased time to complete   Grooming Comments Light grooming tasks completed with Min A with cues for sequencing and assist for thoroughness     UB Bathing Assistance 3  Moderate Assistance UB Bathing Deficit Setup;Verbal cueing;Supervision/safety; Increased time to complete   UB Bathing Comments max cues for sequencing, assist for thoroughness   LB Bathing Assistance 2  Maximal Assistance   LB Bathing Deficit Setup;Verbal cueing;Supervision/safety; Increased time to complete;Perineal area; Buttocks;Right lower leg including foot; Left lower leg including foot   LB Bathing Comments Impaired functional reach   UB Dressing Assistance 3  Moderate Assistance   UB Dressing Deficit Setup;Verbal cueing;Supervision/safety; Increased time to complete   UB Dressing Comments Assist to bring gown down/around in back, Max cues for threading of BUEs into gown   LB Dressing Assistance 2  Maximal Assistance   LB Dressing Deficit Setup;Verbal cueing;Supervision/safety; Increased time to complete; Don/doff R sock; Don/doff L sock   LB Dressing Comments Decreased functional reach   Toileting Assistance  2  Maximal Assistance   Toileting Deficit Setup;Perineal hygiene   Toileting Comments Pt noted to be incontinent of bowels, requiring Max A for perineal hygiene in side lying position   Bed Mobility   Rolling R 3  Moderate assistance   Additional items Assist x 2;Bedrails; Increased time required;Verbal cues;LE management   Rolling L 3  Moderate assistance   Additional items Assist x 2;Bedrails; Increased time required;Verbal cues;LE management   Supine to Sit 2  Maximal assistance   Additional items Assist x 2;HOB elevated; Bedrails; Increased time required;Verbal cues;LE management   Sit to Supine 1  Dependent   Additional items Assist x 2; Increased time required;Verbal cues;LE management   Additional Comments Pt lying supine at end of session with call bell and phone within reach  Bed alarm activated  All needs met and pt reports no further questions for OT at this time      Transfers   Sit to Stand Unable to assess   Additional Comments Not appropriate at this time 2* decreased seated balance/tolerance and decreased safety awareness, requiring total assist x2 to return to supine position for optimal pt safety    Cognition   Overall Cognitive Status Impaired   Arousal/Participation Responsive   Attention Attends with cues to redirect   Orientation Level Oriented to person   Memory Decreased recall of precautions;Decreased recall of recent events;Decreased short term memory   Following Commands Follows one step commands with increased time or repetition   Activity Tolerance   Activity Tolerance Patient limited by fatigue;Patient limited by pain; Other (Comment)  (fear of falling)   Medical Staff Made Aware More RN; Flaco Jordan PTA   Assessment   Assessment Pt seen for OT treatment session this AM focusing on functional activity tolerance, bed mobility, and ADLs  Pt alert throughout session, however often required redirection to task and reassurance of safety for participation in therapy session  Pt lying supine upon entering room  ADL routine completed while lying supine  Light grooming tasks completed with Min A with cues for sequencing and assist for thoroughness  UB ADLs completed with Mod A with max cues for sequencing, assist for thoroughness during UB bathing, and assist to bring gown around/down in back  LB bathing completed with Max A with increased assist to wash B/L lower legs and feet, buttocks, and perineal area 2* decreased functional reach  LB dressing completed with Max A to don/doff B/L socks 2* impaired functional reach demonstrated by pt  Bed mobility (supine>sit) completed with Max A of 2, however pt demonstrated increased retropulsive posture, sliding at EOB returning to supine position at a Dependent x2 level  Rolling L/R completed for toileting tasks with Mod A of 2 with use of bed rails  Pt lying supine at end of session with call bell and phone within reach  Bed alarm activated  All needs met and pt reports no further questions for OT at this time  Continue to recommend STR upon D/C      Plan   Treatment Interventions ADL retraining;Functional transfer training;UE strengthening/ROM; Endurance training;Patient/family training;Equipment evaluation/education; Compensatory technique education; Energy conservation; Activityengagement   Goal Expiration Date 07/16/19   Treatment Day 1   OT Frequency 3-5x/wk   Recommendation   OT Discharge Recommendation Short Term Rehab  (w/ potential LT Placement)   OT - OK to Discharge Yes  (when medically cleared to rehab)   [place] Index   Feeding 5   Bathing 0   Grooming Score 0   Dressing Score 0   Bladder Score 5   Bowels Score 5   Toilet Use Score 0   Transfers (Bed/Chair) Score 0   Mobility (Level Surface) Score 0   Stairs Score 0   [place] Index Score 15   Modified [place] Scale   Modified [place] Scale 4       Klaudia Gomez, OTR/L

## 2019-07-05 NOTE — PLAN OF CARE
Problem: Potential for Falls  Goal: Patient will remain free of falls  Description  INTERVENTIONS:  - Assess patient frequently for physical needs  -  Identify cognitive and physical deficits and behaviors that affect risk of falls    -  [place] fall precautions as indicated by assessment   - Educate patient/family on patient safety including physical limitations  - Instruct patient to call for assistance with activity based on assessment  - Modify environment to reduce risk of injury  - Consider OT/PT consult to assist with strengthening/mobility  Outcome: Progressing     Problem: PAIN - ADULT  Goal: Verbalizes/displays adequate comfort level or baseline comfort level  Description  Interventions:  - Encourage patient to monitor pain and request assistance  - Assess pain using appropriate pain scale  - Administer analgesics based on type and severity of pain and evaluate response  - Implement non-pharmacological measures as appropriate and evaluate response  - Consider cultural and social influences on pain and pain management  - Notify physician/advanced practitioner if interventions unsuccessful or patient reports new pain  Outcome: Progressing     Problem: SAFETY ADULT  Goal: Maintain or return to baseline ADL function  Description  INTERVENTIONS:  -  Assess patient's ability to carry out ADLs; assess patient's baseline for ADL function and identify physical deficits which impact ability to perform ADLs (bathing, care of mouth/teeth, toileting, grooming, dressing, etc )  - Assess/evaluate cause of self-care deficits   - Assess range of motion  - Assess patient's mobility; develop plan if impaired  - Assess patient's need for assistive devices and provide as appropriate  - Encourage maximum independence but intervene and supervise when necessary  ¯ Involve family in performance of ADLs  ¯ Assess for home care needs following discharge   ¯ Request OT consult to assist with ADL evaluation and planning for discharge  ¯ Provide patient education as appropriate  Outcome: Progressing  Goal: Maintain or return mobility status to optimal level  Description  INTERVENTIONS:  - Assess patient's baseline mobility status (ambulation, transfers, stairs, etc )    - Identify cognitive and physical deficits and behaviors that affect mobility  - Identify mobility aids required to assist with transfers and/or ambulation (gait belt, sit-to-stand, lift, walker, cane, etc )  - [place] fall precautions as indicated by assessment  - Record patient progress and toleration of activity level on Mobility SBAR; progress patient to next Phase/Stage  - Instruct patient to call for assistance with activity based on assessment  - Request Rehabilitation consult to assist with strengthening/weightbearing, etc   Outcome: Progressing     Problem: DISCHARGE PLANNING  Goal: Discharge to home or other facility with appropriate resources  Description  INTERVENTIONS:  - Identify barriers to discharge w/patient and caregiver  - Arrange for needed discharge resources and transportation as appropriate  - Identify discharge learning needs (meds, wound care, etc )  - Arrange for interpretive services to assist at discharge as needed  - Refer to Case Management Department for coordinating discharge planning if the patient needs post-hospital services based on physician/advanced practitioner order or complex needs related to functional status, cognitive ability, or social support system  Outcome: Progressing     Problem: Knowledge Deficit  Goal: Patient/family/caregiver demonstrates understanding of disease process, treatment plan, medications, and discharge instructions  Description  Complete learning assessment and assess knowledge base    Interventions:  - Provide teaching at level of understanding  - Provide teaching via preferred learning methods  Outcome: Progressing     Problem: CARDIOVASCULAR - ADULT  Goal: Maintains optimal cardiac output and hemodynamic stability  Description  INTERVENTIONS:  - Monitor I/O, vital signs and rhythm  - Monitor for S/S and trends of decreased cardiac output i e  bleeding, hypotension  - Administer and titrate ordered vasoactive medications to optimize hemodynamic stability  - Assess quality of pulses, skin color and temperature  - Assess for signs of decreased coronary artery perfusion - ex   Angina  - Instruct patient to report change in severity of symptoms  Outcome: Progressing  Goal: Absence of cardiac dysrhythmias or at baseline rhythm  Description  INTERVENTIONS:  - Continuous cardiac monitoring, monitor vital signs, obtain 12 lead EKG if indicated  - Administer antiarrhythmic and heart rate control medications as ordered  - Monitor electrolytes and administer replacement therapy as ordered  Outcome: Progressing     Problem: MUSCULOSKELETAL - ADULT  Goal: Maintain or return mobility to safest level of function  Description  INTERVENTIONS:  - Assess patient's ability to carry out ADLs; assess patient's baseline for ADL function and identify physical deficits which impact ability to perform ADLs (bathing, care of mouth/teeth, toileting, grooming, dressing, etc )  - Assess/evaluate cause of self-care deficits   - Assess range of motion  - Assess patient's mobility; develop plan if impaired  - Assess patient's need for assistive devices and provide as appropriate  - Encourage maximum independence but intervene and supervise when necessary  - Involve family in performance of ADLs  - Assess for home care needs following discharge   - Request OT consult to assist with ADL evaluation and planning for discharge  - Provide patient education as appropriate  Outcome: Progressing  Goal: Maintain proper alignment of affected body part  Description  INTERVENTIONS:  - Support, maintain and protect limb and body alignment  - Provide pt/fam with appropriate education  Outcome: Progressing     Problem: Prexisting or High Potential for Compromised Skin Integrity  Goal: Skin integrity is maintained or improved  Description  INTERVENTIONS:  - Identify patients at risk for skin breakdown  - Assess and monitor skin integrity  - Assess and monitor nutrition and hydration status  - Monitor labs (i e  albumin)  - Assess for incontinence   - Turn and reposition patient  - Assist with mobility/ambulation  - Relieve pressure over bony prominences  - Avoid friction and shearing  - Provide appropriate hygiene as needed including keeping skin clean and dry  - Evaluate need for skin moisturizer/barrier cream  - Collaborate with interdisciplinary team (i e  Nutrition, Rehabilitation, etc )   - Patient/family teaching  Outcome: Progressing

## 2019-07-05 NOTE — ASSESSMENT & PLAN NOTE
Due to anxiety and dementia  This anxiety is improving but still present  She is having some hallucinations, but the anxiety is much better so the family is ok with it

## 2019-07-05 NOTE — PLAN OF CARE
Problem: OCCUPATIONAL THERAPY ADULT  Goal: Performs self-care activities at highest level of function for planned discharge setting  See evaluation for individualized goals  Description  Treatment Interventions: ADL retraining, Functional transfer training, UE strengthening/ROM, Patient/family training, Cognitive reorientation, Endurance training, Equipment evaluation/education, Compensatory technique education, Activityengagement, Energy conservation          See flowsheet documentation for full assessment, interventions and recommendations  Outcome: Progressing  Note:   Limitation: Decreased UE strength, Decreased ADL status, Decreased Safe judgement during ADL, Decreased endurance, Decreased cognition, Decreased sensation, Decreased high-level ADLs, Decreased self-care trans  Prognosis: Fair  Assessment: Pt seen for OT treatment session this AM focusing on functional activity tolerance, bed mobility, and ADLs  Pt alert throughout session, however often required redirection to task and reassurance of safety for participation in therapy session  Pt lying supine upon entering room  ADL routine completed while lying supine  Light grooming tasks completed with Min A with cues for sequencing and assist for thoroughness  UB ADLs completed with Mod A with max cues for sequencing, assist for thoroughness during UB bathing, and assist to bring gown around/down in back  LB bathing completed with Max A with increased assist to wash B/L lower legs and feet, buttocks, and perineal area 2* decreased functional reach  LB dressing completed with Max A to don/doff B/L socks 2* impaired functional reach demonstrated by pt  Bed mobility (supine>sit) completed with Max A of 2, however pt demonstrated increased retropulsive posture, sliding at EOB returning to supine position at a Dependent x2 level  Rolling L/R completed for toileting tasks with Mod A of 2 with use of bed rails   Pt lying supine at end of session with call bell and phone within reach  Bed alarm activated  All needs met and pt reports no further questions for OT at this time  Continue to recommend STR upon D/C        OT Discharge Recommendation: Short Term Rehab(w/ potential LT Placement)  OT - OK to Discharge: Yes(when medically cleared to rehab)

## 2019-07-05 NOTE — PHYSICAL THERAPY NOTE
Physical Therapy Progress Note     07/05/19 1141   Pain Assessment   Pain Assessment FLACC   Pain Rating: FLACC (Rest) - Face 0   Pain Rating: FLACC (Rest) - Legs 0   Pain Rating: FLACC (Rest) - Activity 0   Pain Rating: FLACC (Rest) - Cry 0   Pain Rating: FLACC (Rest) - Consolability 0   Score: FLACC (Rest) 0   Pain Rating: FLACC (Activity) - Face 1   Pain Rating: FLACC (Activity) - Legs 0   Pain Rating: FLACC (Activity) - Activity 1   Pain Rating: FLACC (Activity) - Cry 1   Pain Rating: FLACC (Activity) - Consolability 1   Score: FLACC (Activity) 4   Restrictions/Precautions   Weight Bearing Precautions Per Order No   Other Precautions Cognitive; Chair Alarm; Bed Alarm; Fall Risk;Pain;Multiple lines;Telemetry   General   Chart Reviewed Yes   Response to Previous Treatment Patient unable to report, no changes reported from family or staff   Family/Caregiver Present Yes   Subjective   Subjective "Please help me "   Bed Mobility   Rolling R 3  Moderate assistance   Additional items Assist x 2;HOB elevated; Bedrails; Increased time required;Leg ;Verbal cues;LE management   Rolling L 3  Moderate assistance   Additional items Assist x 2;Bedrails;Leg ; Increased time required;LE management;Verbal cues   Supine to Sit 2  Maximal assistance   Additional items Assist x 2;HOB elevated; Bedrails;Leg ; Increased time required;Verbal cues;LE management   Sit to Supine 1  Dependent   Additional items Assist x 2;Bedrails;Leg ; Increased time required;Verbal cues;LE management   Transfers   Sit to Stand Unable to assess   Additional Comments Not appropriate at this time     Balance   Static Sitting Poor +   Dynamic Sitting Poor   Static Standing Poor -   Dynamic Standing Poor -   Endurance Deficit   Endurance Deficit Yes   Endurance Deficit Description fatigue/pain/fear of falling   Activity Tolerance   Activity Tolerance Patient limited by fatigue   Nurse Made Aware Yes   Exercises   TKR Supine;10 reps;AAROM; Bilateral   Assessment   Prognosis Fair   Problem List Decreased strength;Decreased range of motion;Decreased endurance; Impaired balance;Decreased mobility; Decreased cognition; Impaired judgement;Decreased safety awareness;Decreased skin integrity;Pain   Assessment Pt  supine in bed upon my arrival  Noted per RN and pt's family pt  has been calling for help all morning and holding firmly on bedrail due to fear of falling  Throughout treatment session, pt  required increaed emotional support for continued participation in therapeutic intervention  Performance of HEP supine with cues provided for proper completion  Attempted transfer to EOB, however noted pt  resistive to movement with attempts of lying back down  Not appropriate for OOB mobility  Pt  repositioned supine in bed  Noted pt  incontinent of stool, requiring increased A of 2 with completion of pericare  Pt  remained supine in bed at end of treatment session with alarm active  PT will continue to recommend d/c to rehab when medically stable  Barriers to Discharge Inaccessible home environment;Decreased caregiver support   Barriers to Discharge Comments ability for family to manage patient's care safely   Goals   Patient Goals none expressed 2* impaired cognition   LTG Expiration Date 07/16/19   Treatment Day 1   Plan   Treatment/Interventions Functional transfer training;LE strengthening/ROM; Therapeutic exercise; Endurance training;Bed mobility;Spoke to nursing;Spoke to case management;OT;Family   Progress Slow progress, cognitive deficits   PT Frequency Other (Comment)  (2-4x/wk)   Recommendation   Recommendation Short-term skilled PT   Equipment Recommended Other (Comment)  (continue to assess)   PT - OK to Discharge Yes  (if d/c to rehab when medically stable )     Virginia Chol, PTA

## 2019-07-05 NOTE — PLAN OF CARE
Problem: PHYSICAL THERAPY ADULT  Goal: Performs mobility at highest level of function for planned discharge setting  See evaluation for individualized goals  Description  Treatment/Interventions: Functional transfer training, LE strengthening/ROM, Therapeutic exercise, Endurance training, Patient/family training, Equipment eval/education, Bed mobility, Gait training, Spoke to nursing, OT, Family  Equipment Recommended: Other (Comment)(continue to assess/monitor)       See flowsheet documentation for full assessment, interventions and recommendations  Outcome: Progressing  Note:   Prognosis: Fair  Problem List: Decreased strength, Decreased range of motion, Decreased endurance, Impaired balance, Decreased mobility, Decreased cognition, Impaired judgement, Decreased safety awareness, Decreased skin integrity, Pain  Assessment: Pt  supine in bed upon my arrival  Noted per RN and pt's family pt  has been calling for help all morning and holding firmly on bedrail due to fear of falling  Throughout treatment session, pt  required increaed emotional support for continued participation in therapeutic intervention  Performance of HEP supine with cues provided for proper completion  Attempted transfer to EOB, however noted pt  resistive to movement with attempts of lying back down  Not appropriate for OOB mobility  Pt  repositioned supine in bed  Noted pt  incontinent of stool, requiring increased A of 2 with completion of pericare  Pt  remained supine in bed at end of treatment session with alarm active  PT will continue to recommend d/c to rehab when medically stable  Barriers to Discharge: Inaccessible home environment, Decreased caregiver support  Barriers to Discharge Comments: ability for family to manage patient's care safely  Recommendation: Short-term skilled PT     PT - OK to Discharge: Yes(if d/c to rehab when medically stable )    See flowsheet documentation for full assessment

## 2019-07-05 NOTE — PROGRESS NOTES
Progress Note - Carrie Sutherland 1923, 80 y o  female MRN: [id number]    Unit/Bed#: [place] [address] -01 Encounter: [id number]    Primary Care Provider: DO Francia   Date and time admitted to hospital: 2019 12:47 PM        * Ambulatory dysfunction  Assessment & Plan  Due to anxiety and dementia  This anxiety is improving but still present  She is having some hallucinations, but the anxiety is much better so the family is ok with it  Subjective:   Feels a little less anxious  Still some hallucinations  Not harmful      Objective:     Vitals:   Temp (24hrs), Av °F (36 1 °C), Min:96 8 °F (36 °C), Max:97 2 °F (36 2 °C)    Temp:  [96 8 °F (36 °C)-97 2 °F (36 2 °C)] 96 8 °F (36 °C)  HR:  [68-74] 72  Resp:  [18-22] 22  BP: (121-172)/(66-99) 121/66  SpO2:  [94 %-97 %] 94 %  There is no height or weight on file to calculate BMI  Input and Output Summary (last 24 hours):     No intake or output data in the 24 hours ending 19 1620    Physical Exam:     Physical Exam   Constitutional:   Less anxious  Does answer questions   HENT:   Head: Normocephalic and atraumatic  Eyes: Pupils are equal, round, and reactive to light  EOM are normal    Cardiovascular: Normal rate and regular rhythm  Exam reveals no gallop and no friction rub  No murmur heard  Pulmonary/Chest: Effort normal and breath sounds normal  She has no wheezes  She has no rales  Abdominal: Soft  Bowel sounds are normal  There is no tenderness  Musculoskeletal: She exhibits no edema  Nursing note and vitals reviewed              Additional Data:     Labs:    Results from last 7 days   Lab Units 19  0535   WBC Thousand/uL 5 37   HEMOGLOBIN g/dL 12 2   HEMATOCRIT % 38 6   PLATELETS Thousands/uL 183   NEUTROS PCT % 77*   LYMPHS PCT % 15   MONOS PCT % 7   EOS PCT % 1     Results from last 7 days   Lab Units 19  0535   POTASSIUM mmol/L 4 1   CHLORIDE mmol/L 107   CO2 mmol/L 30   BUN mg/dL 17   CREATININE mg/dL 0  84   CALCIUM mg/dL 9 1   ALK PHOS U/L 64   ALT U/L 111*   AST U/L 85*     Results from last 7 days   Lab Units 07/01/19  0535   INR  1 10                   * I Have Reviewed All Lab Data     Recent Cultures (last 7 days):             Last 24 Hours Medication List:     Current Facility-Administered Medications:  acetaminophen 650 mg Oral Q6H PRN Valentin Cofftimmy, PA-IDRIS   aspirin 81 mg Oral Daily Tracy Piger, PA-C   bisacodyl 10 mg Oral Daily PRN Claire [place], DO   diazepam 2 5 mg Intravenous Q8H PRN [place], DO   enoxaparin 30 mg Subcutaneous Q24H [facility] Tracy Piger, PA-C   escitalopram 20 mg Oral Daily Tracy Piger, PA-C   meclizine 25 mg Oral Q8H PRN Tracy Piger, PA-IDRIS   melatonin 6 mg Oral HS Tracy Piger, PA-C   metoprolol tartrate 25 mg Oral Q12H [facility] David Melo, DO   OLANZapine 5 mg Oral HS RANJANA Akers   ondansetron 4 mg Intravenous Q6H PRN Tracy Piger, PA-IDRIS   polyethylene glycol 17 g Oral Daily Sanjeev Salvador, DO         VTE Pharmacologic Prophylaxis:   Pharmacologic: Enoxaparin (Lovenox)      Current Length of Stay: 6 day(s)    Current Patient Status: Inpatient       Discharge Plan: [facility] cannot take her until Monday    Code Status: Level 1 - Full Code           Today, Patient Was Seen By: Cj Davison DO    ** Please Note: Dictation voice to text software may have been used in the creation of this document   **

## 2019-07-05 NOTE — PLAN OF CARE
Problem: Potential for Falls  Goal: Patient will remain free of falls  Description  INTERVENTIONS:  - Assess patient frequently for physical needs  -  Identify cognitive and physical deficits and behaviors that affect risk of falls    -  [place] fall precautions as indicated by assessment   - Educate patient/family on patient safety including physical limitations  - Instruct patient to call for assistance with activity based on assessment  - Modify environment to reduce risk of injury  - Consider OT/PT consult to assist with strengthening/mobility  Outcome: Progressing     Problem: PAIN - ADULT  Goal: Verbalizes/displays adequate comfort level or baseline comfort level  Description  Interventions:  - Encourage patient to monitor pain and request assistance  - Assess pain using appropriate pain scale  - Administer analgesics based on type and severity of pain and evaluate response  - Implement non-pharmacological measures as appropriate and evaluate response  - Consider cultural and social influences on pain and pain management  - Notify physician/advanced practitioner if interventions unsuccessful or patient reports new pain  Outcome: Progressing     Problem: SAFETY ADULT  Goal: Maintain or return to baseline ADL function  Description  INTERVENTIONS:  -  Assess patient's ability to carry out ADLs; assess patient's baseline for ADL function and identify physical deficits which impact ability to perform ADLs (bathing, care of mouth/teeth, toileting, grooming, dressing, etc )  - Assess/evaluate cause of self-care deficits   - Assess range of motion  - Assess patient's mobility; develop plan if impaired  - Assess patient's need for assistive devices and provide as appropriate  - Encourage maximum independence but intervene and supervise when necessary  ¯ Involve family in performance of ADLs  ¯ Assess for home care needs following discharge   ¯ Request OT consult to assist with ADL evaluation and planning for discharge  ¯ Provide patient education as appropriate  Outcome: Progressing  Goal: Maintain or return mobility status to optimal level  Description  INTERVENTIONS:  - Assess patient's baseline mobility status (ambulation, transfers, stairs, etc )    - Identify cognitive and physical deficits and behaviors that affect mobility  - Identify mobility aids required to assist with transfers and/or ambulation (gait belt, sit-to-stand, lift, walker, cane, etc )  - [place] fall precautions as indicated by assessment  - Record patient progress and toleration of activity level on Mobility SBAR; progress patient to next Phase/Stage  - Instruct patient to call for assistance with activity based on assessment  - Request Rehabilitation consult to assist with strengthening/weightbearing, etc   Outcome: Progressing     Problem: DISCHARGE PLANNING  Goal: Discharge to home or other facility with appropriate resources  Description  INTERVENTIONS:  - Identify barriers to discharge w/patient and caregiver  - Arrange for needed discharge resources and transportation as appropriate  - Identify discharge learning needs (meds, wound care, etc )  - Arrange for interpretive services to assist at discharge as needed  - Refer to Case Management Department for coordinating discharge planning if the patient needs post-hospital services based on physician/advanced practitioner order or complex needs related to functional status, cognitive ability, or social support system  Outcome: Progressing     Problem: Knowledge Deficit  Goal: Patient/family/caregiver demonstrates understanding of disease process, treatment plan, medications, and discharge instructions  Description  Complete learning assessment and assess knowledge base    Interventions:  - Provide teaching at level of understanding  - Provide teaching via preferred learning methods  Outcome: Progressing     Problem: CARDIOVASCULAR - ADULT  Goal: Maintains optimal cardiac output and hemodynamic stability  Description  INTERVENTIONS:  - Monitor I/O, vital signs and rhythm  - Monitor for S/S and trends of decreased cardiac output i e  bleeding, hypotension  - Administer and titrate ordered vasoactive medications to optimize hemodynamic stability  - Assess quality of pulses, skin color and temperature  - Assess for signs of decreased coronary artery perfusion - ex   Angina  - Instruct patient to report change in severity of symptoms  Outcome: Progressing  Goal: Absence of cardiac dysrhythmias or at baseline rhythm  Description  INTERVENTIONS:  - Continuous cardiac monitoring, monitor vital signs, obtain 12 lead EKG if indicated  - Administer antiarrhythmic and heart rate control medications as ordered  - Monitor electrolytes and administer replacement therapy as ordered  Outcome: Progressing     Problem: MUSCULOSKELETAL - ADULT  Goal: Maintain or return mobility to safest level of function  Description  INTERVENTIONS:  - Assess patient's ability to carry out ADLs; assess patient's baseline for ADL function and identify physical deficits which impact ability to perform ADLs (bathing, care of mouth/teeth, toileting, grooming, dressing, etc )  - Assess/evaluate cause of self-care deficits   - Assess range of motion  - Assess patient's mobility; develop plan if impaired  - Assess patient's need for assistive devices and provide as appropriate  - Encourage maximum independence but intervene and supervise when necessary  - Involve family in performance of ADLs  - Assess for home care needs following discharge   - Request OT consult to assist with ADL evaluation and planning for discharge  - Provide patient education as appropriate  Outcome: Progressing  Goal: Maintain proper alignment of affected body part  Description  INTERVENTIONS:  - Support, maintain and protect limb and body alignment  - Provide pt/fam with appropriate education  Outcome: Progressing     Problem: Prexisting or High Potential for Compromised Skin Integrity  Goal: Skin integrity is maintained or improved  Description  INTERVENTIONS:  - Identify patients at risk for skin breakdown  - Assess and monitor skin integrity  - Assess and monitor nutrition and hydration status  - Monitor labs (i e  albumin)  - Assess for incontinence   - Turn and reposition patient  - Assist with mobility/ambulation  - Relieve pressure over bony prominences  - Avoid friction and shearing  - Provide appropriate hygiene as needed including keeping skin clean and dry  - Evaluate need for skin moisturizer/barrier cream  - Collaborate with interdisciplinary team (i e  Nutrition, Rehabilitation, etc )   - Patient/family teaching  Outcome: Progressing

## 2019-07-06 PROCEDURE — 99232 SBSQ HOSP IP/OBS MODERATE 35: CPT | Performed by: HOSPITALIST

## 2019-07-06 RX ADMIN — ASPIRIN 81 MG 81 MG: 81 TABLET ORAL at 08:09

## 2019-07-06 RX ADMIN — POLYETHYLENE GLYCOL 3350 17 G: 17 POWDER, FOR SOLUTION ORAL at 08:09

## 2019-07-06 RX ADMIN — ENOXAPARIN SODIUM 30 MG: 30 INJECTION SUBCUTANEOUS at 08:09

## 2019-07-06 RX ADMIN — DIAZEPAM 2.5 MG: 5 INJECTION, SOLUTION INTRAMUSCULAR; INTRAVENOUS at 22:17

## 2019-07-06 RX ADMIN — ESCITALOPRAM OXALATE 20 MG: 10 TABLET ORAL at 08:09

## 2019-07-06 RX ADMIN — MELATONIN TAB 3 MG 6 MG: 3 TAB at 21:26

## 2019-07-06 RX ADMIN — METOPROLOL TARTRATE 25 MG: 25 TABLET, FILM COATED ORAL at 21:27

## 2019-07-06 RX ADMIN — METOPROLOL TARTRATE 25 MG: 25 TABLET, FILM COATED ORAL at 08:09

## 2019-07-06 RX ADMIN — DIAZEPAM 2.5 MG: 5 INJECTION, SOLUTION INTRAMUSCULAR; INTRAVENOUS at 14:06

## 2019-07-06 RX ADMIN — OLANZAPINE 5 MG: 5 TABLET, FILM COATED ORAL at 21:27

## 2019-07-06 NOTE — ASSESSMENT & PLAN NOTE
Valium has helped  Psych is trying an increased dose of Zyprexa FAMILY HISTORY:  No pertinent family history in first degree relatives

## 2019-07-06 NOTE — PLAN OF CARE
Problem: Potential for Falls  Goal: Patient will remain free of falls  Description  INTERVENTIONS:  - Assess patient frequently for physical needs  -  Identify cognitive and physical deficits and behaviors that affect risk of falls    -  [place] fall precautions as indicated by assessment   - Educate patient/family on patient safety including physical limitations  - Instruct patient to call for assistance with activity based on assessment  - Modify environment to reduce risk of injury  - Consider OT/PT consult to assist with strengthening/mobility  Outcome: Progressing     Problem: PAIN - ADULT  Goal: Verbalizes/displays adequate comfort level or baseline comfort level  Description  Interventions:  - Encourage patient to monitor pain and request assistance  - Assess pain using appropriate pain scale  - Administer analgesics based on type and severity of pain and evaluate response  - Implement non-pharmacological measures as appropriate and evaluate response  - Consider cultural and social influences on pain and pain management  - Notify physician/advanced practitioner if interventions unsuccessful or patient reports new pain  Outcome: Progressing     Problem: SAFETY ADULT  Goal: Maintain or return to baseline ADL function  Description  INTERVENTIONS:  -  Assess patient's ability to carry out ADLs; assess patient's baseline for ADL function and identify physical deficits which impact ability to perform ADLs (bathing, care of mouth/teeth, toileting, grooming, dressing, etc )  - Assess/evaluate cause of self-care deficits   - Assess range of motion  - Assess patient's mobility; develop plan if impaired  - Assess patient's need for assistive devices and provide as appropriate  - Encourage maximum independence but intervene and supervise when necessary  ¯ Involve family in performance of ADLs  ¯ Assess for home care needs following discharge   ¯ Request OT consult to assist with ADL evaluation and planning for discharge  ¯ Provide patient education as appropriate  Outcome: Progressing  Goal: Maintain or return mobility status to optimal level  Description  INTERVENTIONS:  - Assess patient's baseline mobility status (ambulation, transfers, stairs, etc )    - Identify cognitive and physical deficits and behaviors that affect mobility  - Identify mobility aids required to assist with transfers and/or ambulation (gait belt, sit-to-stand, lift, walker, cane, etc )  - [place] fall precautions as indicated by assessment  - Record patient progress and toleration of activity level on Mobility SBAR; progress patient to next Phase/Stage  - Instruct patient to call for assistance with activity based on assessment  - Request Rehabilitation consult to assist with strengthening/weightbearing, etc   Outcome: Progressing     Problem: DISCHARGE PLANNING  Goal: Discharge to home or other facility with appropriate resources  Description  INTERVENTIONS:  - Identify barriers to discharge w/patient and caregiver  - Arrange for needed discharge resources and transportation as appropriate  - Identify discharge learning needs (meds, wound care, etc )  - Arrange for interpretive services to assist at discharge as needed  - Refer to Case Management Department for coordinating discharge planning if the patient needs post-hospital services based on physician/advanced practitioner order or complex needs related to functional status, cognitive ability, or social support system  Outcome: Progressing     Problem: Knowledge Deficit  Goal: Patient/family/caregiver demonstrates understanding of disease process, treatment plan, medications, and discharge instructions  Description  Complete learning assessment and assess knowledge base    Interventions:  - Provide teaching at level of understanding  - Provide teaching via preferred learning methods  Outcome: Progressing     Problem: CARDIOVASCULAR - ADULT  Goal: Maintains optimal cardiac output and hemodynamic stability  Description  INTERVENTIONS:  - Monitor I/O, vital signs and rhythm  - Monitor for S/S and trends of decreased cardiac output i e  bleeding, hypotension  - Administer and titrate ordered vasoactive medications to optimize hemodynamic stability  - Assess quality of pulses, skin color and temperature  - Assess for signs of decreased coronary artery perfusion - ex   Angina  - Instruct patient to report change in severity of symptoms  Outcome: Progressing  Goal: Absence of cardiac dysrhythmias or at baseline rhythm  Description  INTERVENTIONS:  - Continuous cardiac monitoring, monitor vital signs, obtain 12 lead EKG if indicated  - Administer antiarrhythmic and heart rate control medications as ordered  - Monitor electrolytes and administer replacement therapy as ordered  Outcome: Progressing     Problem: MUSCULOSKELETAL - ADULT  Goal: Maintain or return mobility to safest level of function  Description  INTERVENTIONS:  - Assess patient's ability to carry out ADLs; assess patient's baseline for ADL function and identify physical deficits which impact ability to perform ADLs (bathing, care of mouth/teeth, toileting, grooming, dressing, etc )  - Assess/evaluate cause of self-care deficits   - Assess range of motion  - Assess patient's mobility; develop plan if impaired  - Assess patient's need for assistive devices and provide as appropriate  - Encourage maximum independence but intervene and supervise when necessary  - Involve family in performance of ADLs  - Assess for home care needs following discharge   - Request OT consult to assist with ADL evaluation and planning for discharge  - Provide patient education as appropriate  Outcome: Progressing  Goal: Maintain proper alignment of affected body part  Description  INTERVENTIONS:  - Support, maintain and protect limb and body alignment  - Provide pt/fam with appropriate education  Outcome: Progressing     Problem: Prexisting or High Potential for Compromised Skin Integrity  Goal: Skin integrity is maintained or improved  Description  INTERVENTIONS:  - Identify patients at risk for skin breakdown  - Assess and monitor skin integrity  - Assess and monitor nutrition and hydration status  - Monitor labs (i e  albumin)  - Assess for incontinence   - Turn and reposition patient  - Assist with mobility/ambulation  - Relieve pressure over bony prominences  - Avoid friction and shearing  - Provide appropriate hygiene as needed including keeping skin clean and dry  - Evaluate need for skin moisturizer/barrier cream  - Collaborate with interdisciplinary team (i e  Nutrition, Rehabilitation, etc )   - Patient/family teaching  Outcome: Progressing

## 2019-07-06 NOTE — PLAN OF CARE
Problem: Potential for Falls  Goal: Patient will remain free of falls  Description  INTERVENTIONS:  - Assess patient frequently for physical needs  -  Identify cognitive and physical deficits and behaviors that affect risk of falls    -  [place] fall precautions as indicated by assessment   - Educate patient/family on patient safety including physical limitations  - Instruct patient to call for assistance with activity based on assessment  - Modify environment to reduce risk of injury  - Consider OT/PT consult to assist with strengthening/mobility  Outcome: Progressing     Problem: PAIN - ADULT  Goal: Verbalizes/displays adequate comfort level or baseline comfort level  Description  Interventions:  - Encourage patient to monitor pain and request assistance  - Assess pain using appropriate pain scale  - Administer analgesics based on type and severity of pain and evaluate response  - Implement non-pharmacological measures as appropriate and evaluate response  - Consider cultural and social influences on pain and pain management  - Notify physician/advanced practitioner if interventions unsuccessful or patient reports new pain  Outcome: Progressing     Problem: SAFETY ADULT  Goal: Maintain or return to baseline ADL function  Description  INTERVENTIONS:  -  Assess patient's ability to carry out ADLs; assess patient's baseline for ADL function and identify physical deficits which impact ability to perform ADLs (bathing, care of mouth/teeth, toileting, grooming, dressing, etc )  - Assess/evaluate cause of self-care deficits   - Assess range of motion  - Assess patient's mobility; develop plan if impaired  - Assess patient's need for assistive devices and provide as appropriate  - Encourage maximum independence but intervene and supervise when necessary  ¯ Involve family in performance of ADLs  ¯ Assess for home care needs following discharge   ¯ Request OT consult to assist with ADL evaluation and planning for discharge  ¯ Provide patient education as appropriate  Outcome: Progressing  Goal: Maintain or return mobility status to optimal level  Description  INTERVENTIONS:  - Assess patient's baseline mobility status (ambulation, transfers, stairs, etc )    - Identify cognitive and physical deficits and behaviors that affect mobility  - Identify mobility aids required to assist with transfers and/or ambulation (gait belt, sit-to-stand, lift, walker, cane, etc )  - [place] fall precautions as indicated by assessment  - Record patient progress and toleration of activity level on Mobility SBAR; progress patient to next Phase/Stage  - Instruct patient to call for assistance with activity based on assessment  - Request Rehabilitation consult to assist with strengthening/weightbearing, etc   Outcome: Progressing     Problem: DISCHARGE PLANNING  Goal: Discharge to home or other facility with appropriate resources  Description  INTERVENTIONS:  - Identify barriers to discharge w/patient and caregiver  - Arrange for needed discharge resources and transportation as appropriate  - Identify discharge learning needs (meds, wound care, etc )  - Arrange for interpretive services to assist at discharge as needed  - Refer to Case Management Department for coordinating discharge planning if the patient needs post-hospital services based on physician/advanced practitioner order or complex needs related to functional status, cognitive ability, or social support system  Outcome: Progressing     Problem: Knowledge Deficit  Goal: Patient/family/caregiver demonstrates understanding of disease process, treatment plan, medications, and discharge instructions  Description  Complete learning assessment and assess knowledge base    Interventions:  - Provide teaching at level of understanding  - Provide teaching via preferred learning methods  Outcome: Progressing     Problem: CARDIOVASCULAR - ADULT  Goal: Maintains optimal cardiac output and hemodynamic stability  Description  INTERVENTIONS:  - Monitor I/O, vital signs and rhythm  - Monitor for S/S and trends of decreased cardiac output i e  bleeding, hypotension  - Administer and titrate ordered vasoactive medications to optimize hemodynamic stability  - Assess quality of pulses, skin color and temperature  - Assess for signs of decreased coronary artery perfusion - ex   Angina  - Instruct patient to report change in severity of symptoms  Outcome: Progressing  Goal: Absence of cardiac dysrhythmias or at baseline rhythm  Description  INTERVENTIONS:  - Continuous cardiac monitoring, monitor vital signs, obtain 12 lead EKG if indicated  - Administer antiarrhythmic and heart rate control medications as ordered  - Monitor electrolytes and administer replacement therapy as ordered  Outcome: Progressing     Problem: MUSCULOSKELETAL - ADULT  Goal: Maintain or return mobility to safest level of function  Description  INTERVENTIONS:  - Assess patient's ability to carry out ADLs; assess patient's baseline for ADL function and identify physical deficits which impact ability to perform ADLs (bathing, care of mouth/teeth, toileting, grooming, dressing, etc )  - Assess/evaluate cause of self-care deficits   - Assess range of motion  - Assess patient's mobility; develop plan if impaired  - Assess patient's need for assistive devices and provide as appropriate  - Encourage maximum independence but intervene and supervise when necessary  - Involve family in performance of ADLs  - Assess for home care needs following discharge   - Request OT consult to assist with ADL evaluation and planning for discharge  - Provide patient education as appropriate  Outcome: Progressing  Goal: Maintain proper alignment of affected body part  Description  INTERVENTIONS:  - Support, maintain and protect limb and body alignment  - Provide pt/fam with appropriate education  Outcome: Progressing     Problem: Prexisting or High Potential for Compromised Skin Integrity  Goal: Skin integrity is maintained or improved  Description  INTERVENTIONS:  - Identify patients at risk for skin breakdown  - Assess and monitor skin integrity  - Assess and monitor nutrition and hydration status  - Monitor labs (i e  albumin)  - Assess for incontinence   - Turn and reposition patient  - Assist with mobility/ambulation  - Relieve pressure over bony prominences  - Avoid friction and shearing  - Provide appropriate hygiene as needed including keeping skin clean and dry  - Evaluate need for skin moisturizer/barrier cream  - Collaborate with interdisciplinary team (i e  Nutrition, Rehabilitation, etc )   - Patient/family teaching  Outcome: Progressing

## 2019-07-06 NOTE — PROGRESS NOTES
Progress Note - Lee Cope 1923, 80 y o  female MRN: [id number]    Unit/Bed#: [place] [address] -01 Encounter: [id number]    Primary Care Provider: Harleen Kelly DO   Date and time admitted to hospital: 2019 12:47 PM        * Ambulatory dysfunction  Assessment & Plan  Due to anxiety and dementia  This anxiety is improving but still present  She is having some hallucinations, but the anxiety is much better so the family is ok with it  Anxiety  Assessment & Plan  Valium has helped  Psych is trying an increased dose of Zyprexa          Subjective:   Still having hallucinations right now  She has a little more anxiety        Objective:     Vitals:   Temp (24hrs), Av 2 °F (36 2 °C), Min:96 8 °F (36 °C), Max:97 5 °F (36 4 °C)    Temp:  [96 8 °F (36 °C)-97 5 °F (36 4 °C)] 97 5 °F (36 4 °C)  HR:  [] 77  Resp:  [20-22] 20  BP: (121-152)/(66-89) 152/86  SpO2:  [94 %-96 %] 96 %  There is no height or weight on file to calculate BMI  Input and Output Summary (last 24 hours): Intake/Output Summary (Last 24 hours) at 2019 1207  Last data filed at 2019 0811  Gross per 24 hour   Intake    Output 550 ml   Net -550 ml       Physical Exam:     Physical Exam   Constitutional:   Hallucinating, but calm     HENT:   Head: Normocephalic and atraumatic  Eyes: Pupils are equal, round, and reactive to light  EOM are normal    Cardiovascular: Normal rate and regular rhythm  Exam reveals no gallop and no friction rub  No murmur heard  Pulmonary/Chest: Effort normal and breath sounds normal  She has no wheezes  She has no rales  Abdominal: Soft  Bowel sounds are normal  There is no tenderness  Musculoskeletal: She exhibits no edema  Nursing note and vitals reviewed              Additional Data:     Labs:    Results from last 7 days   Lab Units 19  0535   WBC Thousand/uL 5 37   HEMOGLOBIN g/dL 12 2   HEMATOCRIT % 38 6   PLATELETS Thousands/uL 183   NEUTROS PCT % 77*   LYMPHS PCT % 15   MONOS PCT % 7   EOS PCT % 1     Results from last 7 days   Lab Units 07/01/19  0535   POTASSIUM mmol/L 4 1   CHLORIDE mmol/L 107   CO2 mmol/L 30   BUN mg/dL 17   CREATININE mg/dL 0 84   CALCIUM mg/dL 9 1   ALK PHOS U/L 64   ALT U/L 111*   AST U/L 85*     Results from last 7 days   Lab Units 07/01/19  0535   INR  1 10                   * I Have Reviewed All Lab Data     Recent Cultures (last 7 days):             Last 24 Hours Medication List:     Current Facility-Administered Medications:  acetaminophen 650 mg Oral Q6H PRN Lori Adan, PA-C   aspirin 81 mg Oral Daily Tracy Piger, PA-C   bisacodyl 10 mg Oral Daily PRN Sanjeev Prechtel, DO   diazepam 2 5 mg Intravenous Q8H PRN Theotis Dose, DO   enoxaparin 30 mg Subcutaneous Q24H [address] Tracy Piger, PA-C   escitalopram 20 mg Oral Daily Tracy Piger, PA-C   hydrocortisone 1 application Topical 4x Daily PRN Sanjeev Prechtel, DO   meclizine 25 mg Oral Q8H PRN Tracy Piger, PA-C   melatonin 6 mg Oral HS Tracy Piger, PA-C   metoprolol tartrate 25 mg Oral Q12H [address] Rujul Melo, DO   OLANZapine 5 mg Oral HS RANJANA Akers   ondansetron 4 mg Intravenous Q6H PRN Tracy Piger, PA-C   polyethylene glycol 17 g Oral Daily Sanjeev Prechtel, DO         VTE Pharmacologic Prophylaxis:   Pharmacologic: Enoxaparin (Lovenox)      Current Length of Stay: 7 day(s)    Current Patient Status: Inpatient       Discharge Plan: Son Monday when accepted    Code Status: Level 1 - Full Code           Today, Patient Was Seen By: Marcela De Leon DO    ** Please Note: Dictation voice to text software may have been used in the creation of this document   **

## 2019-07-07 PROCEDURE — 99232 SBSQ HOSP IP/OBS MODERATE 35: CPT | Performed by: HOSPITALIST

## 2019-07-07 RX ADMIN — DIAZEPAM 2.5 MG: 5 INJECTION, SOLUTION INTRAMUSCULAR; INTRAVENOUS at 22:22

## 2019-07-07 RX ADMIN — METOPROLOL TARTRATE 25 MG: 25 TABLET, FILM COATED ORAL at 21:42

## 2019-07-07 RX ADMIN — OLANZAPINE 5 MG: 5 TABLET, FILM COATED ORAL at 21:42

## 2019-07-07 RX ADMIN — MELATONIN TAB 3 MG 6 MG: 3 TAB at 21:42

## 2019-07-07 NOTE — ASSESSMENT & PLAN NOTE
Valium and Zyprexa has helped  She has had some hallucination, likely from Zyprexa and dementia  Family is ok with the hallucinations as she is much less anxious

## 2019-07-07 NOTE — PROGRESS NOTES
Progress Note - Jovi Gresham 1923, 80 y o  female MRN: [id number]    Unit/Bed#: [facility] -01 Encounter: [id number]    Primary Care Provider: Kaylah Hutchins DO   Date and time admitted to hospital: 2019 12:47 PM        * Ambulatory dysfunction  Assessment & Plan  Patient is afraid to get out of bed due to high anxiety and dementia  Psychiatry has adjusted her meds  Her anxiety is improving but still present   [facility] to go [facility], but they cannot accept her until tomorrow  Continue psych meds as ordered  Follow up with geriatrics at [facility]  Anxiety  Assessment & Plan  Valium and Zyprexa has helped  She has had some hallucination, likely from Zyprexa and dementia  Family is ok with the hallucinations as she is much less anxious          Subjective:   Sleeping comfortably  Less anxious today  Still needs assistance to walk because she is so anxious about falling  Objective:     Vitals:   Temp (24hrs), Av 8 °F (36 6 °C), Min:97 6 °F (36 4 °C), Max:98 °F (36 7 °C)    Temp:  [97 6 °F (36 4 °C)-98 °F (36 7 °C)] 97 6 °F (36 4 °C)  HR:  [] 71  Resp:  [18-19] 18  BP: (111-172)/(64-96) 121/71  SpO2:  [97 %] 97 %  There is no height or weight on file to calculate BMI  Input and Output Summary (last 24 hours): Intake/Output Summary (Last 24 hours) at 2019 1115  Last data filed at 2019 1223  Gross per 24 hour   Intake    Output 200 ml   Net -200 ml       Physical Exam:     Physical Exam   Constitutional:   Pleasant dementia  Less anxious     HENT:   Head: Normocephalic and atraumatic  Eyes: Pupils are equal, round, and reactive to light  EOM are normal    Cardiovascular: Normal rate and regular rhythm  Exam reveals no gallop and no friction rub  No murmur heard  Pulmonary/Chest: Effort normal and breath sounds normal  She has no wheezes  She has no rales  Abdominal: Soft  Bowel sounds are normal  There is no tenderness  Musculoskeletal: She exhibits no edema  Nursing note and vitals reviewed          Additional Data:     Labs:    Results from last 7 days   Lab Units 07/01/19  0535   WBC Thousand/uL 5 37   HEMOGLOBIN g/dL 12 2   HEMATOCRIT % 38 6   PLATELETS Thousands/uL 183   NEUTROS PCT % 77*   LYMPHS PCT % 15   MONOS PCT % 7   EOS PCT % 1     Results from last 7 days   Lab Units 07/01/19  0535   POTASSIUM mmol/L 4 1   CHLORIDE mmol/L 107   CO2 mmol/L 30   BUN mg/dL 17   CREATININE mg/dL 0 84   CALCIUM mg/dL 9 1   ALK PHOS U/L 64   ALT U/L 111*   AST U/L 85*     Results from last 7 days   Lab Units 07/01/19  0535   INR  1 10                   * I Have Reviewed All Lab Data     Recent Cultures (last 7 days):             Last 24 Hours Medication List:     Current Facility-Administered Medications:  acetaminophen 650 mg Oral Q6H PRN Jacquelyn Mtz PA-C   aspirin 81 mg Oral Daily Tracy Velarde, PA-C   bisacodyl 10 mg Oral Daily PRN Sanjeev Prechtel, DO   diazepam 2 5 mg Intravenous Q8H PRN Shahrzad Senmary, DO   enoxaparin 30 mg Subcutaneous Q24H [facility] Tracy Patelr, PA-C   escitalopram 20 mg Oral Daily Tracy Jorger, PA-C   hydrocortisone 1 application Topical 4x Daily PRN Sanjeev Prechtel, DO   meclizine 25 mg Oral Q8H PRN Tracy Patelr, PA-C   melatonin 6 mg Oral HS Tracy Velarde, PA-C   metoprolol tartrate 25 mg Oral Q12H [facility] David Melo, DO   OLANZapine 5 mg Oral HS RANJANA Akers   ondansetron 4 mg Intravenous Q6H PRN Tracy Pigemike, PA-C   polyethylene glycol 17 g Oral Daily Sanjeev Prechtel, DO         VTE Pharmacologic Prophylaxis:   Pharmacologic: Enoxaparin (Lovenox)      Current Length of Stay: 8 day(s)    Current Patient Status: Inpatient       Discharge Plan: to [facility] tomorrow when they accept her  Code Status: Level 1 - Full Code           Today, Patient Was Seen By: Sofia Nguyen DO    ** Please Note: Dictation voice to text software may have been used in the creation of this document  **

## 2019-07-07 NOTE — ASSESSMENT & PLAN NOTE
Patient is afraid to get out of bed due to high anxiety and dementia  Psychiatry has adjusted her meds  Her anxiety is improving but still present   Family wants her to go to [facility] long term [facility], but they cannot accept her until tomorrow  Continue psych meds as ordered  Follow up with geriatrics at [place]

## 2019-07-07 NOTE — PLAN OF CARE
Problem: Potential for Falls  Goal: Patient will remain free of falls  Description  INTERVENTIONS:  - Assess patient frequently for physical needs  -  Identify cognitive and physical deficits and behaviors that affect risk of falls    -  [place] fall precautions as indicated by assessment   - Educate patient/family on patient safety including physical limitations  - Instruct patient to call for assistance with activity based on assessment  - Modify environment to reduce risk of injury  - Consider OT/PT consult to assist with strengthening/mobility  Outcome: Progressing     Problem: PAIN - ADULT  Goal: Verbalizes/displays adequate comfort level or baseline comfort level  Description  Interventions:  - Encourage patient to monitor pain and request assistance  - Assess pain using appropriate pain scale  - Administer analgesics based on type and severity of pain and evaluate response  - Implement non-pharmacological measures as appropriate and evaluate response  - Consider cultural and social influences on pain and pain management  - Notify physician/advanced practitioner if interventions unsuccessful or patient reports new pain  Outcome: Progressing     Problem: SAFETY ADULT  Goal: Maintain or return to baseline ADL function  Description  INTERVENTIONS:  -  Assess patient's ability to carry out ADLs; assess patient's baseline for ADL function and identify physical deficits which impact ability to perform ADLs (bathing, care of mouth/teeth, toileting, grooming, dressing, etc )  - Assess/evaluate cause of self-care deficits   - Assess range of motion  - Assess patient's mobility; develop plan if impaired  - Assess patient's need for assistive devices and provide as appropriate  - Encourage maximum independence but intervene and supervise when necessary  ¯ Involve family in performance of ADLs  ¯ Assess for home care needs following discharge   ¯ Request OT consult to assist with ADL evaluation and planning for discharge  ¯ Provide patient education as appropriate  Outcome: Progressing  Goal: Maintain or return mobility status to optimal level  Description  INTERVENTIONS:  - Assess patient's baseline mobility status (ambulation, transfers, stairs, etc )    - Identify cognitive and physical deficits and behaviors that affect mobility  - Identify mobility aids required to assist with transfers and/or ambulation (gait belt, sit-to-stand, lift, walker, cane, etc )  - [place] fall precautions as indicated by assessment  - Record patient progress and toleration of activity level on Mobility SBAR; progress patient to next Phase/Stage  - Instruct patient to call for assistance with activity based on assessment  - Request Rehabilitation consult to assist with strengthening/weightbearing, etc   Outcome: Progressing     Problem: DISCHARGE PLANNING  Goal: Discharge to home or other facility with appropriate resources  Description  INTERVENTIONS:  - Identify barriers to discharge w/patient and caregiver  - Arrange for needed discharge resources and transportation as appropriate  - Identify discharge learning needs (meds, wound care, etc )  - Arrange for interpretive services to assist at discharge as needed  - Refer to Case Management Department for coordinating discharge planning if the patient needs post-hospital services based on physician/advanced practitioner order or complex needs related to functional status, cognitive ability, or social support system  Outcome: Progressing     Problem: Knowledge Deficit  Goal: Patient/family/caregiver demonstrates understanding of disease process, treatment plan, medications, and discharge instructions  Description  Complete learning assessment and assess knowledge base    Interventions:  - Provide teaching at level of understanding  - Provide teaching via preferred learning methods  Outcome: Progressing     Problem: CARDIOVASCULAR - ADULT  Goal: Maintains optimal cardiac output and hemodynamic stability  Description  INTERVENTIONS:  - Monitor I/O, vital signs and rhythm  - Monitor for S/S and trends of decreased cardiac output i e  bleeding, hypotension  - Administer and titrate ordered vasoactive medications to optimize hemodynamic stability  - Assess quality of pulses, skin color and temperature  - Assess for signs of decreased coronary artery perfusion - ex   Angina  - Instruct patient to report change in severity of symptoms  Outcome: Progressing  Goal: Absence of cardiac dysrhythmias or at baseline rhythm  Description  INTERVENTIONS:  - Continuous cardiac monitoring, monitor vital signs, obtain 12 lead EKG if indicated  - Administer antiarrhythmic and heart rate control medications as ordered  - Monitor electrolytes and administer replacement therapy as ordered  Outcome: Progressing     Problem: MUSCULOSKELETAL - ADULT  Goal: Maintain or return mobility to safest level of function  Description  INTERVENTIONS:  - Assess patient's ability to carry out ADLs; assess patient's baseline for ADL function and identify physical deficits which impact ability to perform ADLs (bathing, care of mouth/teeth, toileting, grooming, dressing, etc )  - Assess/evaluate cause of self-care deficits   - Assess range of motion  - Assess patient's mobility; develop plan if impaired  - Assess patient's need for assistive devices and provide as appropriate  - Encourage maximum independence but intervene and supervise when necessary  - Involve family in performance of ADLs  - Assess for home care needs following discharge   - Request OT consult to assist with ADL evaluation and planning for discharge  - Provide patient education as appropriate  Outcome: Progressing  Goal: Maintain proper alignment of affected body part  Description  INTERVENTIONS:  - Support, maintain and protect limb and body alignment  - Provide pt/fam with appropriate education  Outcome: Progressing     Problem: Prexisting or High Potential for Compromised Skin Integrity  Goal: Skin integrity is maintained or improved  Description  INTERVENTIONS:  - Identify patients at risk for skin breakdown  - Assess and monitor skin integrity  - Assess and monitor nutrition and hydration status  - Monitor labs (i e  albumin)  - Assess for incontinence   - Turn and reposition patient  - Assist with mobility/ambulation  - Relieve pressure over bony prominences  - Avoid friction and shearing  - Provide appropriate hygiene as needed including keeping skin clean and dry  - Evaluate need for skin moisturizer/barrier cream  - Collaborate with interdisciplinary team (i e  Nutrition, Rehabilitation, etc )   - Patient/family teaching  Outcome: Progressing

## 2019-07-08 PROBLEM — I38 VALVULAR HEART DISEASE: Status: ACTIVE | Noted: 2019-07-08

## 2019-07-08 PROBLEM — I50.33 ACUTE ON CHRONIC DIASTOLIC (CONGESTIVE) HEART FAILURE (HCC): Status: ACTIVE | Noted: 2019-07-08

## 2019-07-08 PROBLEM — R74.01 TRANSAMINITIS: Status: ACTIVE | Noted: 2019-07-08

## 2019-07-08 PROBLEM — F03.90 DEMENTIA (HCC): Status: ACTIVE | Noted: 2019-07-08

## 2019-07-08 LAB
ANION GAP SERPL CALCULATED.3IONS-SCNC: 9 MMOL/L (ref 4–13)
BUN SERPL-MCNC: 17 MG/DL (ref 5–25)
CALCIUM SERPL-MCNC: 9.9 MG/DL (ref 8.3–10.1)
CHLORIDE SERPL-SCNC: 107 MMOL/L (ref 100–108)
CO2 SERPL-SCNC: 28 MMOL/L (ref 21–32)
CREAT SERPL-MCNC: 0.7 MG/DL (ref 0.6–1.3)
ERYTHROCYTE [DISTWIDTH] IN BLOOD BY AUTOMATED COUNT: 13.2 % (ref 11.6–15.1)
GFR SERPL CREATININE-BSD FRML MDRD: 74 ML/MIN/1.73SQ M
GLUCOSE SERPL-MCNC: 104 MG/DL (ref 65–140)
HCT VFR BLD AUTO: 46.4 % (ref 34.8–46.1)
HGB BLD-MCNC: 14.9 G/DL (ref 11.5–15.4)
MCH RBC QN AUTO: 30.9 PG (ref 26.8–34.3)
MCHC RBC AUTO-ENTMCNC: 32.1 G/DL (ref 31.4–37.4)
MCV RBC AUTO: 96 FL (ref 82–98)
PLATELET # BLD AUTO: 240 THOUSANDS/UL (ref 149–390)
PMV BLD AUTO: 9.6 FL (ref 8.9–12.7)
POTASSIUM SERPL-SCNC: 4.1 MMOL/L (ref 3.5–5.3)
RBC # BLD AUTO: 4.82 MILLION/UL (ref 3.81–5.12)
SODIUM SERPL-SCNC: 144 MMOL/L (ref 136–145)
WBC # BLD AUTO: 6.27 THOUSAND/UL (ref 4.31–10.16)

## 2019-07-08 PROCEDURE — 99233 SBSQ HOSP IP/OBS HIGH 50: CPT | Performed by: INTERNAL MEDICINE

## 2019-07-08 PROCEDURE — 97530 THERAPEUTIC ACTIVITIES: CPT

## 2019-07-08 PROCEDURE — 80048 BASIC METABOLIC PNL TOTAL CA: CPT | Performed by: INTERNAL MEDICINE

## 2019-07-08 PROCEDURE — 85027 COMPLETE CBC AUTOMATED: CPT | Performed by: INTERNAL MEDICINE

## 2019-07-08 PROCEDURE — 97535 SELF CARE MNGMENT TRAINING: CPT

## 2019-07-08 PROCEDURE — 97110 THERAPEUTIC EXERCISES: CPT

## 2019-07-08 RX ADMIN — ESCITALOPRAM OXALATE 20 MG: 10 TABLET ORAL at 08:57

## 2019-07-08 RX ADMIN — DIAZEPAM 2.5 MG: 5 INJECTION, SOLUTION INTRAMUSCULAR; INTRAVENOUS at 21:19

## 2019-07-08 RX ADMIN — METOPROLOL TARTRATE 25 MG: 25 TABLET, FILM COATED ORAL at 08:58

## 2019-07-08 RX ADMIN — METOPROLOL TARTRATE 25 MG: 25 TABLET, FILM COATED ORAL at 21:19

## 2019-07-08 RX ADMIN — OLANZAPINE 5 MG: 5 TABLET, FILM COATED ORAL at 21:19

## 2019-07-08 RX ADMIN — MELATONIN TAB 3 MG 6 MG: 3 TAB at 21:19

## 2019-07-08 NOTE — DISCHARGE SUMMARY
Discharge Summary - Medical Roland Moreno 80 y o  female MRN: [id number]    [address] MED SURG Room / Bed: [place] [address] 2 /South 2 M* Encounter: [id number]    BRIEF OVERVIEW      Admission Date: 6/29/2019       Discharge Date:  07/08/2019    Primary Diagnoses  Principal Problem:    Ambulatory dysfunction  Active Problems:    Anxiety    Essential hypertension    Memory loss    Murmur, cardiac    New onset a-fib (HCC)    Hypernatremia    Metabolic encephalopathy  Resolved Problems:    * No resolved hospital problems  *   Acute/chronic diastolic congestive heart failure  Valvular heart disease    Service:  [facility]  Consulting Providers   Psychiatry: Maria Isabel Murphy  Cardiology:  Dr De Avers    Procedures Performed   He done    Hospital Studies:  6/30:  CT head:  No acute intracranial abnormality    7/1:  Echocardiogram:  LEFT VENTRICLE:  Systolic function was normal  Ejection fraction was estimated to be 55 %  There were no regional wall motion abnormalities  Left atrium markedly dilated, right atrium moderately dilated  Moderate-severe mitral regurgitation  Moderate tricuspid regurgitation  Results from last 7 days   Lab Units 07/08/19  0845   WBC Thousand/uL 6 27   HEMOGLOBIN g/dL 14 9   HEMATOCRIT % 46 4*   PLATELETS Thousands/uL 240     Results from last 7 days   Lab Units 07/08/19  0845   POTASSIUM mmol/L 4 1   CHLORIDE mmol/L 107   CO2 mmol/L 28   BUN mg/dL 17   CREATININE mg/dL 0 70   CALCIUM mg/dL 9 9       History and Physical Exam:  Please refer to the Admission H&P note    Hospital Course by Problem  1-ambulatory dysfunction:  Patient was admitted with ambulatory dysfunction, and is extremely fearful about falling  She worked with physical therapy  She continues to have ambulatory dysfunction would benefit from additional supervised living situation and continued physical therapy/occupational therapy    She will be discharged to skilled nursing facility  2-anxiety:  Patient was noted to have significant anxiety and fears about falling  She was noted to have panic attacks  These were noted to be disabling    -Her care was coordinated with the Psychiatry team   They recommend Lexapro, Zyprexa at bedtime to assist with delusions of falling, and p r n  Xanax    -will also coordinate with the geriatric team   The goal is to improve her anxiety, and baseline functioning, with minimal sedating side effects  Benzodiazepine use is anticipated to be short-term, and on a p r n   Basis  3-atrial fibrillation with rapid ventricular response:  Patient was admitted with new onset atrial fibrillation  She was seen in consultation by the cardiology team   She was continued on metoprolol for rate control  Due to concerns over her fall risk, anticoagulation was not recommended at this time  Patient was continued on aspirin  4-essential hypertension:  Patient's blood pressure has predominantly been adequately controlled on the metoprolol 25 mg q 12 hours  Patient is noted to have accelerated hypertension at times, accelerated by her anxiety  Her blood pressure normalizes when her anxiety improves  5-dementia:  Patient's family notes that she had some mild memory deficits for the past several months at home, however was able to live in her home environment  Patient suffered a fall, and her family feels that her dementia has worsened over the past month since her fall    -will coordinate with the geriatric team    6-hypernatremia:  Patient is noted to have hypernatremia secondary to free water deficit secondary to poor p o  Intake  She was given gentle hydration  This normalized  Continue to encourage p o  Hydration  7-memory loss:  Patient without evidence of B12/folic acid deficiency  Patient diagnosed with dementia  Continue supportive measures and reassurance    She will be discharged to a skilled nursing facility for close supervision  8-acute/chronic diastolic congestive heart failure:  Patient was followed by the cardiology team   She was started on diuresis  She is clinically euvolemic  Monitor fluid status as she had decreased intake and hypovolemia initially  9-valvular heart disease:  Patient's echocardiogram revealed moderate-severe mitral regurgitation, possible flail posterior mitral segment, and moderate tricuspid regurgitation  Continue outpatient follow-up  10-mild transaminitis:  During her hospital stay she was noted to have a mild elevation of her transaminases  Her AST was 85,   Her total bilirubin and alkaline phosphatase were normal   Her ammonia level was within normal limits  Not currently on a statin  This will need to continue to be monitored as an outpatient  11-family:  Updated son Nora Meek at great length at bedside  Reviewed all test results and tx plan  Called daughter Andrade Centeno and gave update  D/w PT/OT therapists at bedside  D/w pt's nurse  D/w       Discharge Condition: Improved  Discharge Disposition: [facility] SNF/TCU/SNU    Discharge Note and Physical Exam:   Patient currently denies any pain anywhere at all  She notes that at times she has pain in her joints from arthritis  She denies any pain at this time  She denies any GI upset, nausea/vomiting  She is tolerating p o  She denies any shortness of breath  Vitals:    07/08/19 0740   BP: (!) 176/103   Pulse: 92   Resp: 19   Temp: (!) 97 4 °F (36 3 °C)   SpO2: 95%     General:  Pleasant, non-tachypnic, non-dyspnic  Conversant  Currently working with physical therapy  Heart: Regular rate and rhythm, S1S2 present  No murmur, rub or gallop  Lungs: Clear to auscultation bilaterally, no wheezing, rhonchi, or crackles  Good air movement  No accessory muscle use or respiratory distress  Abdomen: soft, non-tender, non-distended, NABS  No rebound or guarding    No mass or peritoneal signs   Extremities: no clubbing, cyanosis or edema  2+ pedal pulses bilaterally  Neurologic:  Awake and alert  Interactive  Good eye contact  Moving all 4 extremities  No cogwheel rigidity with passive range of motion of both upper extremities  When physical therapy assisted her to stand, she was noted to become stiff in both legs  When she was assisted back to bed, this resolved  Skin: warm and dry  No petechiae, purpura or rash  Discharge Medications   Please see Medical Reconciliation Discharge Form    Discharge Follow Up Appointments:   PCP at [place] upon arrival    Discharge  Statement   Total Time Spent today including physical exam, discussion with patient and family, and discharge arrangements/care = 46 minutes      This note has been constructed using a voice recognition system

## 2019-07-08 NOTE — PLAN OF CARE
Problem: Potential for Falls  Goal: Patient will remain free of falls  Description  INTERVENTIONS:  - Assess patient frequently for physical needs  -  Identify cognitive and physical deficits and behaviors that affect risk of falls    -  [place] fall precautions as indicated by assessment   - Educate patient/family on patient safety including physical limitations  - Instruct patient to call for assistance with activity based on assessment  - Modify environment to reduce risk of injury  - Consider OT/PT consult to assist with strengthening/mobility  Outcome: Progressing     Problem: PAIN - ADULT  Goal: Verbalizes/displays adequate comfort level or baseline comfort level  Description  Interventions:  - Encourage patient to monitor pain and request assistance  - Assess pain using appropriate pain scale  - Administer analgesics based on type and severity of pain and evaluate response  - Implement non-pharmacological measures as appropriate and evaluate response  - Consider cultural and social influences on pain and pain management  - Notify physician/advanced practitioner if interventions unsuccessful or patient reports new pain  Outcome: Progressing     Problem: SAFETY ADULT  Goal: Maintain or return to baseline ADL function  Description  INTERVENTIONS:  -  Assess patient's ability to carry out ADLs; assess patient's baseline for ADL function and identify physical deficits which impact ability to perform ADLs (bathing, care of mouth/teeth, toileting, grooming, dressing, etc )  - Assess/evaluate cause of self-care deficits   - Assess range of motion  - Assess patient's mobility; develop plan if impaired  - Assess patient's need for assistive devices and provide as appropriate  - Encourage maximum independence but intervene and supervise when necessary  ¯ Involve family in performance of ADLs  ¯ Assess for home care needs following discharge   ¯ Request OT consult to assist with ADL evaluation and planning for discharge  ¯ Provide patient education as appropriate  Outcome: Progressing  Goal: Maintain or return mobility status to optimal level  Description  INTERVENTIONS:  - Assess patient's baseline mobility status (ambulation, transfers, stairs, etc )    - Identify cognitive and physical deficits and behaviors that affect mobility  - Identify mobility aids required to assist with transfers and/or ambulation (gait belt, sit-to-stand, lift, walker, cane, etc )  - [place] fall precautions as indicated by assessment  - Record patient progress and toleration of activity level on Mobility SBAR; progress patient to next Phase/Stage  - Instruct patient to call for assistance with activity based on assessment  - Request Rehabilitation consult to assist with strengthening/weightbearing, etc   Outcome: Progressing     Problem: DISCHARGE PLANNING  Goal: Discharge to home or other facility with appropriate resources  Description  INTERVENTIONS:  - Identify barriers to discharge w/patient and caregiver  - Arrange for needed discharge resources and transportation as appropriate  - Identify discharge learning needs (meds, wound care, etc )  - Arrange for interpretive services to assist at discharge as needed  - Refer to Case Management Department for coordinating discharge planning if the patient needs post-hospital services based on physician/advanced practitioner order or complex needs related to functional status, cognitive ability, or social support system  Outcome: Progressing     Problem: Knowledge Deficit  Goal: Patient/family/caregiver demonstrates understanding of disease process, treatment plan, medications, and discharge instructions  Description  Complete learning assessment and assess knowledge base    Interventions:  - Provide teaching at level of understanding  - Provide teaching via preferred learning methods  Outcome: Progressing     Problem: CARDIOVASCULAR - ADULT  Goal: Maintains optimal cardiac output and hemodynamic stability  Description  INTERVENTIONS:  - Monitor I/O, vital signs and rhythm  - Monitor for S/S and trends of decreased cardiac output i e  bleeding, hypotension  - Administer and titrate ordered vasoactive medications to optimize hemodynamic stability  - Assess quality of pulses, skin color and temperature  - Assess for signs of decreased coronary artery perfusion - ex   Angina  - Instruct patient to report change in severity of symptoms  Outcome: Progressing  Goal: Absence of cardiac dysrhythmias or at baseline rhythm  Description  INTERVENTIONS:  - Continuous cardiac monitoring, monitor vital signs, obtain 12 lead EKG if indicated  - Administer antiarrhythmic and heart rate control medications as ordered  - Monitor electrolytes and administer replacement therapy as ordered  Outcome: Progressing     Problem: MUSCULOSKELETAL - ADULT  Goal: Maintain or return mobility to safest level of function  Description  INTERVENTIONS:  - Assess patient's ability to carry out ADLs; assess patient's baseline for ADL function and identify physical deficits which impact ability to perform ADLs (bathing, care of mouth/teeth, toileting, grooming, dressing, etc )  - Assess/evaluate cause of self-care deficits   - Assess range of motion  - Assess patient's mobility; develop plan if impaired  - Assess patient's need for assistive devices and provide as appropriate  - Encourage maximum independence but intervene and supervise when necessary  - Involve family in performance of ADLs  - Assess for home care needs following discharge   - Request OT consult to assist with ADL evaluation and planning for discharge  - Provide patient education as appropriate  Outcome: Progressing  Goal: Maintain proper alignment of affected body part  Description  INTERVENTIONS:  - Support, maintain and protect limb and body alignment  - Provide pt/fam with appropriate education  Outcome: Progressing     Problem: Prexisting or High Potential for Compromised Skin Integrity  Goal: Skin integrity is maintained or improved  Description  INTERVENTIONS:  - Identify patients at risk for skin breakdown  - Assess and monitor skin integrity  - Assess and monitor nutrition and hydration status  - Monitor labs (i e  albumin)  - Assess for incontinence   - Turn and reposition patient  - Assist with mobility/ambulation  - Relieve pressure over bony prominences  - Avoid friction and shearing  - Provide appropriate hygiene as needed including keeping skin clean and dry  - Evaluate need for skin moisturizer/barrier cream  - Collaborate with interdisciplinary team (i e  Nutrition, Rehabilitation, etc )   - Patient/family teaching  Outcome: Progressing

## 2019-07-08 NOTE — SOCIAL WORK
LOS: 9 GMLOS: 4 2  PATIENT IS A READMISSION  NOT A BUNDLE    CM received a response from Son in regards to the financial application that was submitted the day before the 4th of July that was just processed today  Pt is not eligible, at this point in time, to receive MA to assist in affording her the SNF level of care as pt has significant funding in a trust  Son is working the family to determine if they will paying out of pocket for the care after with her Medicare pays it's portion of expenses  Lily Ford, MSW  7/8/2019  14:51    UPDATE: PARRIS saw son, Naina Vargas, in the room with pt  Stopped to speak with him about what is holding things up with Son  He reported that pt's home is in a trust, with him and his sisters as the owner's of the trust, but the pt has no financial connection to the home  Son took a tax bill from the property over to Son to prove that is the case; it was given to Madison Lopez

## 2019-07-08 NOTE — PLAN OF CARE
Problem: PHYSICAL THERAPY ADULT  Goal: Performs mobility at highest level of function for planned discharge setting  See evaluation for individualized goals  Description  Treatment/Interventions: Functional transfer training, LE strengthening/ROM, Therapeutic exercise, Endurance training, Patient/family training, Equipment eval/education, Bed mobility, Gait training, Spoke to nursing, OT, Family  Equipment Recommended: Other (Comment)(continue to assess/monitor)       See flowsheet documentation for full assessment, interventions and recommendations  Outcome: Progressing  Note:   Prognosis: Fair  Problem List: Decreased strength, Decreased range of motion, Decreased endurance, Impaired balance, Decreased mobility, Decreased cognition, Decreased coordination, Decreased safety awareness, Impaired tone, Decreased skin integrity  Assessment: Pt seen for bed mobility, transfers, sitting and standing balance and b/l le there x ROM  Pt required less assistance for supine to sit, pt requiring mod assist x2 with head of bed elevated and use of bedrails  Pt required mod assist x2 initially for static sitting balance progressing to min- close supervision with b/l ue support and support of R bedrail and L ue support on bed  Pt peforms sit to stand transfers with max assist x2  Max cues for improved posture, le weightbearing, le placement and positioning and safety  Pt retropulsive with NBOS, increased rigidity and lifting L le and placing l le on top of R foot during standing activities  Pt able to tolerate static standing x 15 seconds  Upon sitting after standing, noted increased retropulsion, pt unable to bends knees and place feet directly under for proper support and balance  Pt began sliding while seated at edge of bed requiring max assist x1 and then pt was returned to supine with dependant assist x2  Total edge of bed sitting tolerance 15 minutes   Pt performed aa-arom to b/l le's increased rigidity noted with hip abd /add  Pt performs active hip and knee flexion, slr, saq, and ap's  limited L ankle DF due to tight heelcord and ankle inversion  Pt requires tactile and verbal cues for proper le exercise techniques and performance  Pt performed longsitting x2 trials while supine in be with support of bedrails and mod assist x1  PT unable to maintain for prolonged periods of time  Fatigues easily  Recommend inpt rehab at d/c    Barriers to Discharge: Inaccessible home environment, Decreased caregiver support  Barriers to Discharge Comments: ability for family to manage patient's care safely  Recommendation: Short-term skilled PT     PT - OK to Discharge: Yes(to rehab when medically cleared   )    See flowsheet documentation for full assessment

## 2019-07-08 NOTE — PLAN OF CARE
Problem: Potential for Falls  Goal: Patient will remain free of falls  Description  INTERVENTIONS:  - Assess patient frequently for physical needs  -  Identify cognitive and physical deficits and behaviors that affect risk of falls    -  [place] fall precautions as indicated by assessment   - Educate patient/family on patient safety including physical limitations  - Instruct patient to call for assistance with activity based on assessment  - Modify environment to reduce risk of injury  - Consider OT/PT consult to assist with strengthening/mobility  Outcome: Progressing     Problem: PAIN - ADULT  Goal: Verbalizes/displays adequate comfort level or baseline comfort level  Description  Interventions:  - Encourage patient to monitor pain and request assistance  - Assess pain using appropriate pain scale  - Administer analgesics based on type and severity of pain and evaluate response  - Implement non-pharmacological measures as appropriate and evaluate response  - Consider cultural and social influences on pain and pain management  - Notify physician/advanced practitioner if interventions unsuccessful or patient reports new pain  Outcome: Progressing     Problem: SAFETY ADULT  Goal: Maintain or return to baseline ADL function  Description  INTERVENTIONS:  -  Assess patient's ability to carry out ADLs; assess patient's baseline for ADL function and identify physical deficits which impact ability to perform ADLs (bathing, care of mouth/teeth, toileting, grooming, dressing, etc )  - Assess/evaluate cause of self-care deficits   - Assess range of motion  - Assess patient's mobility; develop plan if impaired  - Assess patient's need for assistive devices and provide as appropriate  - Encourage maximum independence but intervene and supervise when necessary  ¯ Involve family in performance of ADLs  ¯ Assess for home care needs following discharge   ¯ Request OT consult to assist with ADL evaluation and planning for discharge  ¯ Provide patient education as appropriate  Outcome: Progressing  Goal: Maintain or return mobility status to optimal level  Description  INTERVENTIONS:  - Assess patient's baseline mobility status (ambulation, transfers, stairs, etc )    - Identify cognitive and physical deficits and behaviors that affect mobility  - Identify mobility aids required to assist with transfers and/or ambulation (gait belt, sit-to-stand, lift, walker, cane, etc )  - [place] fall precautions as indicated by assessment  - Record patient progress and toleration of activity level on Mobility SBAR; progress patient to next Phase/Stage  - Instruct patient to call for assistance with activity based on assessment  - Request Rehabilitation consult to assist with strengthening/weightbearing, etc   Outcome: Progressing     Problem: DISCHARGE PLANNING  Goal: Discharge to home or other facility with appropriate resources  Description  INTERVENTIONS:  - Identify barriers to discharge w/patient and caregiver  - Arrange for needed discharge resources and transportation as appropriate  - Identify discharge learning needs (meds, wound care, etc )  - Arrange for interpretive services to assist at discharge as needed  - Refer to Case Management Department for coordinating discharge planning if the patient needs post-hospital services based on physician/advanced practitioner order or complex needs related to functional status, cognitive ability, or social support system  Outcome: Progressing     Problem: Knowledge Deficit  Goal: Patient/family/caregiver demonstrates understanding of disease process, treatment plan, medications, and discharge instructions  Description  Complete learning assessment and assess knowledge base    Interventions:  - Provide teaching at level of understanding  - Provide teaching via preferred learning methods  Outcome: Progressing     Problem: CARDIOVASCULAR - ADULT  Goal: Maintains optimal cardiac output and hemodynamic stability  Description  INTERVENTIONS:  - Monitor I/O, vital signs and rhythm  - Monitor for S/S and trends of decreased cardiac output i e  bleeding, hypotension  - Administer and titrate ordered vasoactive medications to optimize hemodynamic stability  - Assess quality of pulses, skin color and temperature  - Assess for signs of decreased coronary artery perfusion - ex   Angina  - Instruct patient to report change in severity of symptoms  Outcome: Progressing  Goal: Absence of cardiac dysrhythmias or at baseline rhythm  Description  INTERVENTIONS:  - Continuous cardiac monitoring, monitor vital signs, obtain 12 lead EKG if indicated  - Administer antiarrhythmic and heart rate control medications as ordered  - Monitor electrolytes and administer replacement therapy as ordered  Outcome: Progressing     Problem: MUSCULOSKELETAL - ADULT  Goal: Maintain or return mobility to safest level of function  Description  INTERVENTIONS:  - Assess patient's ability to carry out ADLs; assess patient's baseline for ADL function and identify physical deficits which impact ability to perform ADLs (bathing, care of mouth/teeth, toileting, grooming, dressing, etc )  - Assess/evaluate cause of self-care deficits   - Assess range of motion  - Assess patient's mobility; develop plan if impaired  - Assess patient's need for assistive devices and provide as appropriate  - Encourage maximum independence but intervene and supervise when necessary  - Involve family in performance of ADLs  - Assess for home care needs following discharge   - Request OT consult to assist with ADL evaluation and planning for discharge  - Provide patient education as appropriate  Outcome: Progressing  Goal: Maintain proper alignment of affected body part  Description  INTERVENTIONS:  - Support, maintain and protect limb and body alignment  - Provide pt/fam with appropriate education  Outcome: Progressing     Problem: Prexisting or High Potential for Compromised Skin Integrity  Goal: Skin integrity is maintained or improved  Description  INTERVENTIONS:  - Identify patients at risk for skin breakdown  - Assess and monitor skin integrity  - Assess and monitor nutrition and hydration status  - Monitor labs (i e  albumin)  - Assess for incontinence   - Turn and reposition patient  - Assist with mobility/ambulation  - Relieve pressure over bony prominences  - Avoid friction and shearing  - Provide appropriate hygiene as needed including keeping skin clean and dry  - Evaluate need for skin moisturizer/barrier cream  - Collaborate with interdisciplinary team (i e  Nutrition, Rehabilitation, etc )   - Patient/family teaching  Outcome: Progressing

## 2019-07-08 NOTE — PHYSICAL THERAPY NOTE
Physical Therapy Treatment Note       07/08/19 2105   Pain Assessment   Pain Assessment No/denies pain   Restrictions/Precautions   Other Precautions Cognitive; Bed Alarm; Fall Risk   General   Chart Reviewed Yes   Family/Caregiver Present Yes   Cognition   Overall Cognitive Status Impaired   Arousal/Participation Responsive   Attention Attends with cues to redirect   Orientation Level Oriented to person;Oriented to place; Disoriented to time;Disoriented to situation   Memory Decreased recall of precautions;Decreased recall of recent events;Decreased short term memory   Following Commands Follows one step commands with increased time or repetition   Bed Mobility   Supine to Sit 3  Moderate assistance   Additional items Assist x 2;HOB elevated; Bedrails; Increased time required;Verbal cues;LE management   Sit to Supine 1  Dependent   Additional items Assist x 3; Increased time required;Verbal cues;LE management   Additional Comments Pt performed longsitting in bed with use of bedrails at foot of bed for support  Pt pulls self into longsitting x2 with mod assist x1  PT requires support of bedrails or mod assist of therapist to maintain longsitting in bed without back support  PT performs static sitting at edge of bed with mod assist x1- min assist x1 with support of bedril on R and L ue support of bed  PT perforemd forward trunk flexion x2 and forward reaching activites x2 with close supervision to min assist x1  Initially requiring mod assist dueto retropulsion  post sit to stand transfers noted increased retropulsion and increased rigidness requring max assist to maintain static sitting at edge of bed  Transfers   Sit to Stand 2  Maximal assistance   Additional items Assist x 2;Trapeze bar;Verbal cues   Stand to Sit 2  Maximal assistance   Additional items Assist x 2; Increased time required;Verbal cues   Additional Comments pt performed sit to stand with max assist x2, retropulsion,adducated, gait, pt lifting L le up off floor and palcing it on R foot  Pt requiring max assist to maintain static standing with support of Rw  PT tolerated static standing x 15 seconds  Balance   Static Sitting Poor +   Dynamic Sitting Poor -   Static Standing Zero   Dynamic Standing Poor -   Ambulatory Zero  (pt unable to take steps)   Endurance Deficit   Endurance Deficit Yes  (fatigue, weakness, )   Activity Tolerance   Activity Tolerance Patient limited by fatigue   Medical Staff Made Aware Zohreh CHUA   Nurse Made Aware yes   Exercises   Heelslides Supine;10 reps;AROM; Bilateral   Hip Flexion Supine;10 reps;AROM; Right;Left   Hip Abduction Supine;10 reps;AAROM;AROM; Right;Left   Hip Adduction Supine;10 reps;AAROM;AROM; Right;Left   Ankle Pumps Supine;10 reps;AROM; Bilateral   Assessment   Prognosis Fair   Problem List Decreased strength;Decreased range of motion;Decreased endurance; Impaired balance;Decreased mobility; Decreased cognition;Decreased coordination;Decreased safety awareness; Impaired tone;Decreased skin integrity   Assessment Pt seen for bed mobility, transfers, sitting and standing balance and b/l le there x ROM   Pt required less assistance for supine to sit, pt requiring mod assist x2 with head of bed elevated and use of bedrails   Pt required mod assist x2 initially for static sitting balance progressing to min- close supervision with b/l ue support and support of R bedrail and L ue support on bed   Pt peforms sit to stand transfers with max assist x2  Max cues for improved posture, le weightbearing, le placement and positioning and safety  Pt retropulsive with NBOS, increased rigidity and lifting L le and placing l le on top of R foot during standing activities  Pt able to tolerate static standing x 15 seconds  Upon sitting after standing, noted increased retropulsion, pt unable to bends knees and place feet directly under for proper support and balance   Pt began sliding while seated at edge of bed requiring max assist x1 and then pt was returned to supine with dependant assist x2  Total edge of bed sitting tolerance 15 minutes  Pt performed aa-arom to b/l le's increased rigidity noted with hip abd /add  Pt performs active hip and knee flexion, slr, saq, and ap's  limited L ankle DF due to tight heelcord and ankle inversion  Pt requires tactile and verbal cues for proper le exercise techniques and performance  Pt performed longsitting x2 trials while supine in be with support of bedrails and mod assist x1  PT unable to maintain for prolonged periods of time  Fatigues easily  Recommend inpt rehab at d/c  Goals   Patient Goals " To be able to do for myself "     [facility] Expiration Date 07/16/19   Treatment Day 2   Plan   Treatment/Interventions Functional transfer training;LE strengthening/ROM; Therapeutic exercise; Endurance training;Cognitive reorientation;Patient/family training;Equipment eval/education; Bed mobility;Gait training;Spoke to nursing;OT;Spoke to case management; Family   Progress Slow progress, cognitive deficits   PT Frequency   (2-4x/week)   Recommendation   Recommendation Short-term skilled PT   PT - OK to Discharge Yes  (to rehab when medically cleared   )       Grace Mcneill PTA

## 2019-07-08 NOTE — PLAN OF CARE
Problem: OCCUPATIONAL THERAPY ADULT  Goal: Performs self-care activities at highest level of function for planned discharge setting  See evaluation for individualized goals  Description  Treatment Interventions: ADL retraining, Functional transfer training, UE strengthening/ROM, Patient/family training, Cognitive reorientation, Endurance training, Equipment evaluation/education, Compensatory technique education, Activityengagement, Energy conservation          See flowsheet documentation for full assessment, interventions and recommendations  Outcome: Progressing  Note:   Limitation: Decreased UE strength, Decreased ADL status, Decreased Safe judgement during ADL, Decreased endurance, Decreased cognition, Decreased sensation, Decreased high-level ADLs, Decreased self-care trans  Prognosis: Fair  Assessment: Pt was seen for skilled OT with focus on completion of self care tasks, BUE ROM exercises, review of basic orientation, fall prevention, self feeding and review of current plan of care  Pt with need for close communication with in 14" of her central visual field to achieve carry over and comfort with functional tasks  Min A overall for light grooming activity following repositioning and long sitting activity with bed positioned as chair  Pt able to complete task with appropriate quality using visual cue of mirror on over bed table  See above levels of A required for all functional tasks  Pt may benefit from further rehab with focus on achieving optimal performance levels with all functional tasks  Continue to recommend Inpatient rehab     OT Discharge Recommendation: Short Term Rehab  OT - OK to Discharge:  Yes

## 2019-07-08 NOTE — OCCUPATIONAL THERAPY NOTE
Occupational Therapy Treatment Note:         07/08/19 1300   Restrictions/Precautions   Weight Bearing Precautions Per Order No   Other Precautions Fall Risk;Cognitive; Bed Alarm; Chair Alarm   Pain Assessment   Pain Assessment No/denies pain   Pain Score No Pain   Pain Type Acute pain;Chronic pain   Pain Location Foot   Pain Orientation Left   ADL   Where Assessed Supine, bed  (HOB elevated  )   Eating Assistance 4  Minimal Assistance   Eating Deficit Setup;Verbal cueing; Increased time to complete;Supervision/safety   Eating Comments Pt with deficits noted to motor plan this tx session  Grooming Assistance 4  Minimal Assistance   Grooming Deficit Setup;Verbal cueing;Supervision/safety; Increased time to complete   Grooming Comments Pt able to complete activity with visual aid of mirror on bedside table  LB Bathing Assistance 2  Maximal Assistance   LB Bathing Deficit Setup   LB Bathing Comments increased A will be required to complete with rolling  UB Dressing Assistance 4  Minimal Assistance   UB Dressing Deficit Setup   LB Dressing Assistance 2  Maximal Assistance   LB Dressing Deficit Setup;Steadying; Requires assistive device for steadying;Supervision/safety;Verbal cueing; Increased time to complete; Don/doff R sock; Don/doff L sock   Functional Standing Tolerance   Time unable to assess  Bed Mobility   Rolling R 3  Moderate assistance   Additional items Assist x 2   Rolling L 3  Moderate assistance   Additional items Assist x 2   Supine to Sit 3  Moderate assistance   Additional items Assist x 2   Sit to Supine 1  Dependent   Additional items Assist x 3   Additional Comments Improvement noted with motor planning following long sitting activity  Transfers   Sit to Stand 2  Maximal assistance   Additional items Assist x 2;Bedrails;HOB elevated; Increased time required;Verbal cues   Stand to Sit 2  Maximal assistance   Additional items Assist x 2;Armrests; Increased time required;Verbal cues; Bedrails Additional Comments Pt with increased stiffness with activity  Cognition   Overall Cognitive Status Impaired   Arousal/Participation Responsive; Cooperative   Attention Attends with cues to redirect   Orientation Level Oriented to person;Oriented to place   Memory Decreased short term memory;Decreased recall of recent events;Decreased recall of precautions   Following Commands Follows one step commands without difficulty   Comments Improvement communication with glasses provided and upright positioning  Additional Activities   Additional Activities Other (Comment)  (reviewed current plan of care)   Additional Activities Comments Pt reports having F understanding  Questionable carry over noted  Activity Tolerance   Activity Tolerance Patient limited by fatigue;Patient limited by pain   Medical Staff Made Aware Reported all findings to nursing staff  Assessment   Assessment Pt was seen for skilled OT with focus on completion of self care tasks, BUE ROM exercises, review of basic orientation, fall prevention, self feeding and review of current plan of care  Pt with need for close communication with in 14" of her central visual field to achieve carry over and comfort with functional tasks  Min A overall for light grooming activity following repositioning and long sitting activity with bed positioned as chair  Pt able to complete task with appropriate quality using visual cue of mirror on over bed table  See above levels of A required for all functional tasks  Pt may benefit from further rehab with focus on achieving optimal performance levels with all functional tasks  Continue to recommend Inpatient rehab   Plan   Treatment Interventions ADL retraining;Functional transfer training; Endurance training;Cognitive reorientation;UE strengthening/ROM   Goal Expiration Date 07/16/19   Treatment Day 2   OT Frequency 3-5x/wk   Recommendation   OT Discharge Recommendation Short Term Rehab   OT - OK to Discharge Yes Barthel Index   Feeding 5   Bathing 0   Grooming Score 0   Dressing Score 0   Bladder Score 5   Bowels Score 5   Toilet Use Score 0   Transfers (Bed/Chair) Score 0   Mobility (Level Surface) Score 0   Stairs Score 0   Barthel Index Score 15   Modified [place] Scale   Modified [place] Scale 4   Binh Prince, [address]

## 2019-07-09 PROCEDURE — 99223 1ST HOSP IP/OBS HIGH 75: CPT | Performed by: NURSE PRACTITIONER

## 2019-07-09 PROCEDURE — 99232 SBSQ HOSP IP/OBS MODERATE 35: CPT | Performed by: INTERNAL MEDICINE

## 2019-07-09 RX ORDER — ALPRAZOLAM 0.25 MG/1
0.25 TABLET ORAL 4 TIMES DAILY PRN
Status: DISCONTINUED | OUTPATIENT
Start: 2019-07-09 | End: 2019-07-10 | Stop reason: HOSPADM

## 2019-07-09 RX ADMIN — ESCITALOPRAM OXALATE 20 MG: 10 TABLET ORAL at 09:35

## 2019-07-09 RX ADMIN — ASPIRIN 81 MG 81 MG: 81 TABLET ORAL at 09:36

## 2019-07-09 RX ADMIN — METOPROLOL TARTRATE 25 MG: 25 TABLET, FILM COATED ORAL at 09:36

## 2019-07-09 RX ADMIN — MELATONIN TAB 3 MG 6 MG: 3 TAB at 21:52

## 2019-07-09 RX ADMIN — POLYETHYLENE GLYCOL 3350 17 G: 17 POWDER, FOR SOLUTION ORAL at 09:35

## 2019-07-09 RX ADMIN — ALPRAZOLAM 0.25 MG: 0.25 TABLET ORAL at 17:26

## 2019-07-09 RX ADMIN — ACETAMINOPHEN 650 MG: 325 TABLET ORAL at 17:26

## 2019-07-09 RX ADMIN — OLANZAPINE 5 MG: 5 TABLET, FILM COATED ORAL at 21:52

## 2019-07-09 RX ADMIN — ENOXAPARIN SODIUM 30 MG: 30 INJECTION SUBCUTANEOUS at 09:35

## 2019-07-09 NOTE — PROGRESS NOTES
Progress Note - Brenda Tierney 80 y o  female MRN: [id number]    Unit/Bed#: [facility] 68 2 -01 Encounter: [id number]      Assessment/Plan:  1-ambulatory dysfunction:  Patient was admitted with ambulatory dysfunction, and is extremely fearful about falling  she had a previous fall , requiring short-term rehab  She worked with physical therapy  She continues to have ambulatory dysfunction would benefit from additional supervised living situation and continued physical therapy/occupational therapy  She will be discharged to skilled nursing facility      2-anxiety:  Patient was noted to have significant anxiety and fears about falling  She was noted to have panic attacks  These were noted to be disabling                -Her care was coordinated with the Psychiatry team   They recommend Lexapro, Zyprexa at bedtime to assist with delusions of falling, and p r n  Xanax                -patient was also evaluated by the geriatric team   The goal is to improve her anxiety, and baseline functioning, with minimal sedating side effects  Benzodiazepine use is anticipated to be short-term, and on a p r n   Basis      3-atrial fibrillation with rapid ventricular response:  Patient was admitted with new onset atrial fibrillation  She was seen in consultation by the cardiology team   She was continued on metoprolol for rate control  Due to concerns over her fall risk, anticoagulation was not recommended at this time  Patient was continued on aspirin      4-essential hypertension:  Patient's blood pressure has predominantly been adequately controlled on the metoprolol 25 mg q 12 hours  Patient is noted to have accelerated hypertension at times, accelerated by her anxiety  Her blood pressure normalizes when her anxiety improves     -most recent recent blood pressure was 119/84     5-dementia:  Patient's family notes that she had some mild memory deficits for the past several months at home, however was able to live in her home environment  Patient suffered a fall, and her family feels that her dementia has worsened over the past month since her fall                -will coordinate with the geriatric team   -TSH, folate, B12 within normal limits     6-hypernatremia:  Patient was noted to have hypernatremia secondary to free water deficit secondary to poor p o  Intake  She was given gentle hydration  This normalized  Continue to encourage p o  Hydration      7-memory loss:  Patient without evidence of B12/folic acid deficiency  Patient diagnosed with dementia  Continue supportive measures and reassurance  She will be discharged to a skilled nursing facility for close supervision      8-acute/chronic diastolic congestive heart failure:  Patient was followed by the cardiology team   She was started on diuresis  She is clinically euvolemic  Monitor fluid status as she had decreased intake and hypovolemia initially  She does not require daily diuretics      9-valvular heart disease:  Patient's echocardiogram revealed moderate-severe mitral regurgitation, possible flail posterior mitral segment, and moderate tricuspid regurgitation  Continue outpatient follow-up      10-mild transaminitis:  During her hospital stay she was noted to have a mild elevation of her transaminases  Her AST was 85,   Her total bilirubin and alkaline phosphatase were normal   Her ammonia level was within normal limits  Not currently on a statin  This will need to continue to be monitored as an outpatient  11-family:  Updated son Pascual Coleman at the bedside      Discussed with patient's nurse  Discussed with       VTE Pharmacologic Prophylaxis: Enoxaparin (Lovenox)  VTE Mechanical Prophylaxis: sequential compression device    Certification Statement: The patient will continue to require additional inpatient hospital stay due to need for further acute intervention for short-term rehab placement/skilled nursing facility    Status: inpatient ===================================================================    Subjective:  Patient complains of some soreness in her right arm  She is agreeable to an ice pack  She is agreeable to Tylenol if the ice pack does not help her pain  She denies any pain anywhere else  Patient notes that she was working with physical therapy earlier  Her son also notes that for the past hour she seemed very anxious and was gripping the bed tightly  Patient denies any shortness of breath  She denies any abdominal pain, nausea, vomiting, diarrhea  She is tolerating p o  Physical Exam:   Temp:  [96 8 °F (36 °C)-98 5 °F (36 9 °C)] 98 5 °F (36 9 °C)  HR:  [] 63  Resp:  [16-18] 16  BP: (112-155)/(66-89) 119/84    Gen:  Pleasant, non-tachypnic, non-dyspnic  Conversant  Son at the bedside  Heart: regular rate and rhythm, S1S2 present, no murmur, rub or gallop  Lungs: clear to ausculatation bilaterally  No wheezing, crackles, or rhonchi  No accessory muscle use or respiratory distress  Abd: soft, non-tender, non-distended  NABS, no guarding, rebound or peritoneal signs  Extremities: no clubbing, cyanosis or edema  2+pedal pulses bilaterally  Full range of motion  Neuro: awake, alert  Good eye contact  Follows commands  Skin: warm and dry: no petechiae, purpura and rash  Ecchymoses noted both forearms    LABS:   Results from last 7 days   Lab Units 07/08/19  0845   WBC Thousand/uL 6 27   HEMOGLOBIN g/dL 14 9   HEMATOCRIT % 46 4*   PLATELETS Thousands/uL 240     Results from last 7 days   Lab Units 07/08/19  0845   POTASSIUM mmol/L 4 1   CHLORIDE mmol/L 107   CO2 mmol/L 28   BUN mg/dL 17   CREATININE mg/dL 0 70   CALCIUM mg/dL 9 9       Hospital Data:    6/30:  CT head:  No acute intracranial abnormality     7/1:  Echocardiogram:  LEFT VENTRICLE:  Systolic function was normal  Ejection fraction was estimated to be 55 %    There were no regional wall motion abnormalities  Left atrium markedly dilated, right atrium moderately dilated  Moderate-severe mitral regurgitation  Moderate tricuspid regurgitation         ---------------------------------------------------------------------------------------------------------------  This note has been constructed using a voice recognition system

## 2019-07-09 NOTE — CONSULTS
Consultation - Geriatrics   Larry Melchor 80 y o  female MRN: [id number]  Unit/Bed#: [facility] 2 -01 Encounter: [id number]      Assessment/Plan  1  Anxiety  Per family has history but  Significantly worsened after fall in June  Seen by psych on 07/01/2019  Started on Zyprexa 2 5 mg p o  Q h s  Secondary to delusions and agitation  Also started on Xanax 0 25 mg p o  Q i d  P r n  Continued on Lexapro from 20   Monitor QTC: 397  Monitor sodium   TSH within normal limits    2  Dementia  Not formally diagnosed  Previously diagnoses with memory loss until stay in [place] in June 2019  Family feels that this is worsened since fall  Patient may have underlying delirium  Recommend following delirium precautions, refer to 3    TSH, Folate and vitamin B12 within normal limits    3  Delirium  Alert and oriented x3 at baseline  Treat pain, patient had recent fall with head lac and sutures  Follow geriatric pain protocol  Monitor for constipation  Provide frequent redirection, reorientation, distraction techniques  Avoid deliriogenic medications such as tramadol, benzodiazepines, anticholinergics,  Benadryl  Monitor for  urinary retention  Encourage early and frequent moblization, OOB  Encourage Hydration/ Nutrition  Implement sleep hygiene, limit night time interuptions, group activities    4  Deconditioning  Albumin 2 8  Recommend protein supplementation  Recommend Nutrition consult  Recommend PT/OT  Encourage frequent mobilization    5  Ambulatory dysfunction  Patient with recent admission with fall  Discharge from rehab on 06/26/2019  Ambulates with a roller walker  PT/OT    6  Pain due to trauma  Geriatric pain protocol  Acetaminophen 975 mg p o  Q 8 hours schedule  Consider p r n  Low-dose oxycodone 2 5 mg p o  Q 4 hours p r n   For moderate pain  Monitor for constipation          History of Present Illness   Physician Requesting Consult: Sarah Vargas MD  Reason for Consult / Principal Problem: dementia  Hx and PE limited by: dementia  HPI: Fozia Chow is a 80y o  year old female who presents with increased panic attacks at home  Patient becomes very stiff and rigid in able to ambulate she normally would  She is recently hospitalized from 6/7/19 to 6/11/19 after fall  Post hospitalization she was Son rehab until 06/26/2019  Patient follows with [facility] ger[facility]s as an outpatient  Last evaluation was 06/24/2019 via telephone interview  Unable to take patient to appointment on 06/27/2019  She has a past medical history of hypertension, anxiety, dementia, osteoporosis  Prior to her arrival patient lives at home alone in apartment with close supportive family  Her children take turns going over to apartment at night needed with her  Previously she ambulated with a walker  She was previously admitted for fall  Prior to hospitalization she was able to perform her own ADLs  Spoke with daughter Courtney Lopez, who states that she was forgetful at times, forgetting how to make coffee or how to use the phone  Since the fall in June she has been stiff and fearful with falling  Per family  Not diagnosed with dementia until seen in [address] [place] [id number] rehab previously she was told she had dementia  At baseline she was alert and oriented x2  She states she has an issues with constipation  Inpatient consult to Gerontology  Consult performed by: RANJANA Fairchild  Consult ordered by: Bernarda Gaspar MD          Review of Systems   Unable to perform ROS: Dementia       Historical Information   Past Medical History:   Diagnosis Date    HTN (hypertension)      History reviewed  No pertinent surgical history    Social History   Social History     Substance and Sexual Activity   Alcohol Use Yes    Alcohol/week: 1 0 standard drinks    Types: 1 Glasses of wine per week    Frequency: 2-3 times a week    Drinks per session: 1 or 2    Comment: social      Social History     Substance and Sexual Activity   Drug Use No Social History     Tobacco Use   Smoking Status Former Smoker    Types: Cigarettes    Last attempt to quit: 3/20/1965    Years since quittin 3   Smokeless Tobacco Never Used         Family History: non-contributory    Meds/Allergies   Current meds:   Current Facility-Administered Medications   Medication Dose Route Frequency    acetaminophen (TYLENOL) tablet 650 mg  650 mg Oral Q6H PRN    aspirin chewable tablet 81 mg  81 mg Oral Daily    bisacodyl (DULCOLAX) EC tablet 10 mg  10 mg Oral Daily PRN    diazepam (VALIUM) injection 2 5 mg  2 5 mg Intravenous Q8H PRN    enoxaparin (LOVENOX) subcutaneous injection 30 mg  30 mg Subcutaneous Q24H SEAN    escitalopram (LEXAPRO) tablet 20 mg  20 mg Oral Daily    hydrocortisone 2 5 % cream 1 application  1 application Topical 4x Daily PRN    meclizine (ANTIVERT) tablet 25 mg  25 mg Oral Q8H PRN    melatonin tablet 6 mg  6 mg Oral HS    metoprolol tartrate (LOPRESSOR) tablet 25 mg  25 mg Oral Q12H SEAN    OLANZapine (ZyPREXA) tablet 5 mg  5 mg Oral HS    ondansetron (ZOFRAN) injection 4 mg  4 mg Intravenous Q6H PRN    polyethylene glycol (MIRALAX) packet 17 g  17 g Oral Daily      Current PTA meds:  Medications Prior to Admission   Medication    acetaminophen (TYLENOL) 325 mg tablet    Ascorbic Acid, Vitamin C, (VITAMIN C) 100 MG tablet    aspirin 81 mg chewable tablet    calcium-vitamin D (OSCAL) 250-125 MG-UNIT per tablet    escitalopram (LEXAPRO) 20 mg tablet    fexofenadine (ALLEGRA) 180 MG tablet    lisinopril (ZESTRIL) 10 mg tablet    MECLIZINE HCL PO    menthol-zinc oxide (CALMOSEPTINE) 0 44-20 6 % OINT    Multiple Vitamin (THERA-TABS PO)    Multiple Vitamins-Minerals (MULTIVITAL-M) TABS    Biotin 1000 MCG tablet    vitamin E, tocopherol, 400 units capsule    Zinc 50 MG TABS        Allergies   Allergen Reactions    Cephalexin      Other reaction(s): thrush    Sulfa Antibiotics Other (See Comments)       Objective   Vitals: Blood pressure 154/83, pulse 66, temperature (!) 96 8 °F (36 °C), temperature source Temporal, resp  rate 16, SpO2 96 %  ,There is no height or weight on file to calculate BMI  Physical Exam   Constitutional: She appears well-developed and well-nourished  No distress  HENT:   Head: Normocephalic  Eyes: Right eye exhibits no discharge  Left eye exhibits no discharge  Neck: Normal range of motion  Cardiovascular: Normal rate, regular rhythm and normal heart sounds  Exam reveals no gallop and no friction rub  No murmur heard  Pulmonary/Chest: Effort normal and breath sounds normal  No stridor  No respiratory distress  She has no wheezes  Abdominal: Soft  Bowel sounds are normal  She exhibits no distension  There is no tenderness  There is no guarding  Musculoskeletal: Normal range of motion  She exhibits no edema  Neurological: She is alert  Skin: Skin is warm and dry  She is not diaphoretic  Psychiatric: She has a normal mood and affect  Nursing note and vitals reviewed  Lab Results:   Results from last 7 days   Lab Units 07/08/19  0845   WBC Thousand/uL 6 27   HEMOGLOBIN g/dL 14 9   HEMATOCRIT % 46 4*   PLATELETS Thousands/uL 240        Results from last 7 days   Lab Units 07/08/19  0845   POTASSIUM mmol/L 4 1   CHLORIDE mmol/L 107   CO2 mmol/L 28   BUN mg/dL 17   CREATININE mg/dL 0 70   CALCIUM mg/dL 9 9       Imaging Studies: I have personally reviewed pertinent reports  EKG, Pathology, and Other Studies: I have personally reviewed pertinent reports      VTE Prophylaxis: Sequential compression device (Venodyne)     Code Status: Level 1 - Full Code

## 2019-07-10 VITALS
TEMPERATURE: 97.4 F | RESPIRATION RATE: 18 BRPM | HEIGHT: 60 IN | SYSTOLIC BLOOD PRESSURE: 133 MMHG | DIASTOLIC BLOOD PRESSURE: 76 MMHG | HEART RATE: 74 BPM | BODY MASS INDEX: 19.76 KG/M2 | OXYGEN SATURATION: 99 %

## 2019-07-10 PROCEDURE — 99239 HOSP IP/OBS DSCHRG MGMT >30: CPT | Performed by: INTERNAL MEDICINE

## 2019-07-10 RX ORDER — ALPRAZOLAM 0.25 MG/1
0.25 TABLET ORAL 4 TIMES DAILY PRN
Qty: 8 TABLET | Refills: 0 | Status: SHIPPED | OUTPATIENT
Start: 2019-07-10 | End: 2019-07-20

## 2019-07-10 RX ORDER — POLYETHYLENE GLYCOL 3350 17 G/17G
17 POWDER, FOR SOLUTION ORAL DAILY
Qty: 14 EACH | Refills: 0 | Status: SHIPPED | OUTPATIENT
Start: 2019-07-11

## 2019-07-10 RX ORDER — LANOLIN ALCOHOL/MO/W.PET/CERES
6 CREAM (GRAM) TOPICAL
Qty: 10 TABLET | Refills: 0 | Status: SHIPPED | OUTPATIENT
Start: 2019-07-10

## 2019-07-10 RX ORDER — OLANZAPINE 5 MG/1
5 TABLET ORAL
Qty: 30 TABLET | Refills: 0 | Status: SHIPPED | OUTPATIENT
Start: 2019-07-10

## 2019-07-10 RX ADMIN — ALPRAZOLAM 0.25 MG: 0.25 TABLET ORAL at 15:01

## 2019-07-10 RX ADMIN — METOPROLOL TARTRATE 25 MG: 25 TABLET, FILM COATED ORAL at 09:05

## 2019-07-10 RX ADMIN — ALPRAZOLAM 0.25 MG: 0.25 TABLET ORAL at 10:12

## 2019-07-10 RX ADMIN — ESCITALOPRAM OXALATE 20 MG: 10 TABLET ORAL at 09:05

## 2019-07-10 RX ADMIN — ASPIRIN 81 MG 81 MG: 81 TABLET ORAL at 09:06

## 2019-07-10 RX ADMIN — POLYETHYLENE GLYCOL 3350 17 G: 17 POWDER, FOR SOLUTION ORAL at 09:06

## 2019-07-10 RX ADMIN — ENOXAPARIN SODIUM 30 MG: 30 INJECTION SUBCUTANEOUS at 09:06

## 2019-07-10 NOTE — DISCHARGE SUMMARY
Discharge Summary - Medical Kt Mon 80 y o  female MRN: [id number]    [address] MED SURG Room / Bed: [place] 2 /[facility] 2 M* Encounter: [id number]    BRIEF OVERVIEW      Admission Date: 6/29/2019       Discharge Date:  07/10/2019    Primary Diagnoses  Principal Problem:    Ambulatory dysfunction  Active Problems:    Anxiety    Essential hypertension    Memory loss    Murmur, cardiac    New onset a-fib (HCC)    Hypernatremia    Metabolic encephalopathy    Dementia    Acute on chronic diastolic (congestive) heart failure (HCC)    Valvular heart disease    Transaminitis  Resolved Problems:    * No resolved hospital problems  *      Service:  [facility]  Consulting Providers   Psychiatry: Maria Isabel Murphy  Cardiology:  Dr Davion Friedman  Geriatrics:  Dr David Perla     Procedures Performed   He done  [facility] Studies:  6/30:  CT head:  No acute intracranial abnormality     7/1:  Echocardiogram:  LEFT VENTRICLE:  Systolic function was normal  Ejection fraction was estimated to be 55 %  There were no regional wall motion abnormalities  Left atrium markedly dilated, right atrium moderately dilated  Moderate-severe mitral regurgitation  Moderate tricuspid regurgitation          Results from last 7 days   Lab Units 07/08/19  0845   WBC Thousand/uL 6 27   HEMOGLOBIN g/dL 14 9   HEMATOCRIT % 46 4*   PLATELETS Thousands/uL 240     Results from last 7 days   Lab Units 07/08/19  0845   POTASSIUM mmol/L 4 1   CHLORIDE mmol/L 107   CO2 mmol/L 28   BUN mg/dL 17   CREATININE mg/dL 0 70   CALCIUM mg/dL 9 9       History and Physical Exam:  Please refer to the Admission H&P note    Hospital Course by Problem  1-ambulatory dysfunction:  Patient was admitted with ambulatory dysfunction, and is extremely fearful about falling  She did suffer a previous fall prior to admission that resulted in a short-term rehab stay      during her hospitalization she worked with physical therapy  She continues to have ambulatory dysfunction would benefit from additional supervised living situation and continued physical therapy/occupational therapy  She will be discharged to skilled nursing facility      2-anxiety:  Patient was noted to have significant anxiety and fears about falling  She was noted to have panic attacks  These were noted to be disabling                -Her care was coordinated with the Psychiatry team   They recommend Lexapro, Zyprexa at bedtime to assist with delusions of falling, and p r n  Xanax                 -she was also evaluated by the geriatric team   The goal is to improve her anxiety, and baseline functioning, with minimal sedating side effects  Benzodiazepine use is anticipated to be short-term, and on a p r n   Basis      3-atrial fibrillation with rapid ventricular response:  Patient was admitted with new onset atrial fibrillation  She was seen in consultation by the cardiology team   She was continued on metoprolol for rate control  Due to concerns over her fall risk, anticoagulation was not recommended at this time  Patient was continued on aspirin      4-essential hypertension:  Patient's blood pressure has predominantly been adequately controlled on the metoprolol 25 mg q 12 hours  Patient is noted to have accelerated hypertension at times, accelerated by her anxiety  Her blood pressure normalizes when her anxiety improves      5-dementia:  Patient's family notes that she had some mild memory deficits for the past several months at home, however was able to live in her home environment    Patient suffered a fall, and her family feels that her dementia has worsened over the past month since her fall                -will coordinate with the geriatric team:  She will need follow-up for complete cognitive testing     6-hypernatremia:  Patient was noted to have hypernatremia secondary to free water deficit secondary to poor p o  Intake  She was given gentle hydration  This normalized  Continue to encourage p o  Hydration      7-memory loss:  Patient without evidence of B12/folic acid deficiency  Patient diagnosed with dementia  Continue supportive measures and reassurance  She will be discharged to a skilled nursing facility for close supervision      8-acute/chronic diastolic congestive heart failure:  Patient was followed by the cardiology team   She was started on diuresis  She is clinically euvolemic  Monitor fluid status as she had decreased intake and hypovolemia initially  She is not requiring any daily diuretics  Future diuretics will be given on a p r n   Basis     9-valvular heart disease:  Patient's echocardiogram revealed moderate-severe mitral regurgitation, possible flail posterior mitral segment, and moderate tricuspid regurgitation  Continue outpatient follow-up      10-mild transaminitis:  During her hospital stay she was noted to have a mild elevation of her transaminases  Her AST was 85,   Her total bilirubin and alkaline phosphatase were normal   Her ammonia level was within normal limits  Not currently on a statin  This will need to continue to be monitored as an outpatient  11-family: called daughter Eliana Stallworth and LM to call my cell number for update    D/w pt's nurse  D/w     Discharge Condition: Improved  Discharge Disposition: Non [facility] SNF/TCU/SNU    Discharge Note and Physical Exam:   Patient relates that she feels anxious but she is not certain what she feels anxious about  She denies any sensation of falling  She notes she feels better when she is holding on to the bed rail  She denies any dizziness currently  She denies any current pain anywhere  She denies any shortness of breath  She denies any nausea or vomiting or GI upset  She notes she is tolerating p o  Cassandra Sole     Vitals:    07/10/19 0854   BP: (!) 178/79   Pulse: 70   Resp: 16   Temp: (!) 97 1 °F (36 2 °C) SpO2:      General:  Pleasant, non-tachypnic, non-dyspnic  Conversant  Heart: Regular rate and rhythm, S1S2 present  No murmur, rub or gallop  Lungs: Clear to auscultation bilaterally, no wheezing, rhonchi, or crackles  Good air movement  No accessory muscle use or respiratory distress  Abdomen: soft, non-tender, non-distended, NABS  No rebound or guarding  No mass or peritoneal signs  Extremities: no clubbing, cyanosis or edema  2+ pedal pulses bilaterally  Neurologic:  Awake and alert  Good eye contact  Fluent speech  Follows commands  Moving all 4 extremities  Skin: warm and dry  No petechiae, purpura or rash  Discharge Medications   Please see Medical Reconciliation Discharge Form    Discharge Follow Up Appointments:   Ming Crowder DO: 1 week  Dr Maritza Bearden:  2 weeks for cognitive testing    Discharge  Statement   Total Time Spent today including physical exam, discussion with patient and family, and discharge arrangements/care = 32 minutes      This note has been constructed using a voice recognition system

## 2019-07-10 NOTE — SOCIAL WORK
LOS: 11 GMLOS: 4 2  PATIENT IS A READMISSION  NOT A BUNDLE    Attending made CM aware that patient would be cleared for discharge  A BLS transport, d/t dementia, falls risk and ambulatory dysfxn was scheduled with [place] for 4:30 today  CMN form in chart; copy made for MR bin  Numbers for report in EPIC     IMM presented to dtr, Stephanie Nelson, as patient has dementia; copy in MR bin  Updated: attending, RN, unit clerk, dtr and facility via [facility]  7/10/2019  11:42

## 2019-07-10 NOTE — PLAN OF CARE
Problem: Potential for Falls  Goal: Patient will remain free of falls  Description  INTERVENTIONS:  - Assess patient frequently for physical needs  -  Identify cognitive and physical deficits and behaviors that affect risk of falls    -  [place] fall precautions as indicated by assessment   - Educate patient/family on patient safety including physical limitations  - Instruct patient to call for assistance with activity based on assessment  - Modify environment to reduce risk of injury  - Consider OT/PT consult to assist with strengthening/mobility  Outcome: Progressing     Problem: PAIN - ADULT  Goal: Verbalizes/displays adequate comfort level or baseline comfort level  Description  Interventions:  - Encourage patient to monitor pain and request assistance  - Assess pain using appropriate pain scale  - Administer analgesics based on type and severity of pain and evaluate response  - Implement non-pharmacological measures as appropriate and evaluate response  - Consider cultural and social influences on pain and pain management  - Notify physician/advanced practitioner if interventions unsuccessful or patient reports new pain  Outcome: Progressing     Problem: SAFETY ADULT  Goal: Maintain or return to baseline ADL function  Description  INTERVENTIONS:  -  Assess patient's ability to carry out ADLs; assess patient's baseline for ADL function and identify physical deficits which impact ability to perform ADLs (bathing, care of mouth/teeth, toileting, grooming, dressing, etc )  - Assess/evaluate cause of self-care deficits   - Assess range of motion  - Assess patient's mobility; develop plan if impaired  - Assess patient's need for assistive devices and provide as appropriate  - Encourage maximum independence but intervene and supervise when necessary  ¯ Involve family in performance of ADLs  ¯ Assess for home care needs following discharge   ¯ Request OT consult to assist with ADL evaluation and planning for discharge  ¯ Provide patient education as appropriate  Outcome: Progressing  Goal: Maintain or return mobility status to optimal level  Description  INTERVENTIONS:  - Assess patient's baseline mobility status (ambulation, transfers, stairs, etc )    - Identify cognitive and physical deficits and behaviors that affect mobility  - Identify mobility aids required to assist with transfers and/or ambulation (gait belt, sit-to-stand, lift, walker, cane, etc )  - [place] fall precautions as indicated by assessment  - Record patient progress and toleration of activity level on Mobility SBAR; progress patient to next Phase/Stage  - Instruct patient to call for assistance with activity based on assessment  - Request Rehabilitation consult to assist with strengthening/weightbearing, etc   Outcome: Progressing     Problem: DISCHARGE PLANNING  Goal: Discharge to home or other facility with appropriate resources  Description  INTERVENTIONS:  - Identify barriers to discharge w/patient and caregiver  - Arrange for needed discharge resources and transportation as appropriate  - Identify discharge learning needs (meds, wound care, etc )  - Arrange for interpretive services to assist at discharge as needed  - Refer to Case Management Department for coordinating discharge planning if the patient needs post-hospital services based on physician/advanced practitioner order or complex needs related to functional status, cognitive ability, or social support system  Outcome: Progressing     Problem: Knowledge Deficit  Goal: Patient/family/caregiver demonstrates understanding of disease process, treatment plan, medications, and discharge instructions  Description  Complete learning assessment and assess knowledge base    Interventions:  - Provide teaching at level of understanding  - Provide teaching via preferred learning methods  Outcome: Progressing     Problem: CARDIOVASCULAR - ADULT  Goal: Maintains optimal cardiac output and hemodynamic stability  Description  INTERVENTIONS:  - Monitor I/O, vital signs and rhythm  - Monitor for S/S and trends of decreased cardiac output i e  bleeding, hypotension  - Administer and titrate ordered vasoactive medications to optimize hemodynamic stability  - Assess quality of pulses, skin color and temperature  - Assess for signs of decreased coronary artery perfusion - ex   Angina  - Instruct patient to report change in severity of symptoms  Outcome: Progressing  Goal: Absence of cardiac dysrhythmias or at baseline rhythm  Description  INTERVENTIONS:  - Continuous cardiac monitoring, monitor vital signs, obtain 12 lead EKG if indicated  - Administer antiarrhythmic and heart rate control medications as ordered  - Monitor electrolytes and administer replacement therapy as ordered  Outcome: Progressing     Problem: MUSCULOSKELETAL - ADULT  Goal: Maintain or return mobility to safest level of function  Description  INTERVENTIONS:  - Assess patient's ability to carry out ADLs; assess patient's baseline for ADL function and identify physical deficits which impact ability to perform ADLs (bathing, care of mouth/teeth, toileting, grooming, dressing, etc )  - Assess/evaluate cause of self-care deficits   - Assess range of motion  - Assess patient's mobility; develop plan if impaired  - Assess patient's need for assistive devices and provide as appropriate  - Encourage maximum independence but intervene and supervise when necessary  - Involve family in performance of ADLs  - Assess for home care needs following discharge   - Request OT consult to assist with ADL evaluation and planning for discharge  - Provide patient education as appropriate  Outcome: Progressing  Goal: Maintain proper alignment of affected body part  Description  INTERVENTIONS:  - Support, maintain and protect limb and body alignment  - Provide pt/fam with appropriate education  Outcome: Progressing     Problem: Prexisting or High Potential for Compromised Skin Integrity  Goal: Skin integrity is maintained or improved  Description  INTERVENTIONS:  - Identify patients at risk for skin breakdown  - Assess and monitor skin integrity  - Assess and monitor nutrition and hydration status  - Monitor labs (i e  albumin)  - Assess for incontinence   - Turn and reposition patient  - Assist with mobility/ambulation  - Relieve pressure over bony prominences  - Avoid friction and shearing  - Provide appropriate hygiene as needed including keeping skin clean and dry  - Evaluate need for skin moisturizer/barrier cream  - Collaborate with interdisciplinary team (i e  Nutrition, Rehabilitation, etc )   - Patient/family teaching  Outcome: Progressing

## 2019-07-15 ENCOUNTER — TRANSITIONAL CARE MANAGEMENT (OUTPATIENT)
Dept: FAMILY MEDICINE CLINIC | Facility: CLINIC | Age: 84
End: 2019-07-15

## 2024-03-12 NOTE — PROGRESS NOTES
Assessment and Plan:    Problem List Items Addressed This Visit     None        Health Maintenance Due   Topic Date Due    Medicare Annual Wellness Visit (AWV)  1923         HPI:  Aurelio Delarosa is a 80 y o  female here for her Subsequent Wellness Visit  Patient Active Problem List   Diagnosis    Age-related osteoporosis without current pathological fracture    Anxiety state    Essential hypertension    Memory loss    Mixed hyperlipidemia    Medicare annual wellness visit, subsequent     No past medical history on file  No past surgical history on file    Family History   Problem Relation Age of Onset    Stroke Father     Suicidality Sister      Social History     Tobacco Use   Smoking Status Former Smoker    Types: Cigarettes    Last attempt to quit: 3/20/1965    Years since quittin 0   Smokeless Tobacco Never Used     Social History     Substance and Sexual Activity   Alcohol Use Yes    Comment: social       Social History     Substance and Sexual Activity   Drug Use No       Current Outpatient Medications   Medication Sig Dispense Refill    ALPRAZolam (XANAX) 0 25 mg tablet Take 0 25 mg by mouth 2 (two) times a day      Ascorbic Acid, Vitamin C, (VITAMIN C) 100 MG tablet Take 500 mg by mouth      aspirin 81 mg chewable tablet Chew 1 tablet      escitalopram (LEXAPRO) 20 mg tablet Take 1 tablet (20 mg total) by mouth daily 90 tablet 3    fexofenadine (ALLEGRA) 180 MG tablet Take 1 tablet by mouth      lisinopril (ZESTRIL) 10 mg tablet Take 1 tablet (10 mg total) by mouth daily 90 tablet 1    Lutein 20 MG CAPS Take 40 mg by mouth      Multiple Vitamins-Minerals (MULTIVITAL-M) TABS Take 1 tablet by mouth      vitamin E, tocopherol, 400 units capsule Take 1 capsule by mouth      Zinc 50 MG TABS Take 1 tablet by mouth      Biotin 1000 MCG tablet Take 1,000 mcg by mouth      calcium-vitamin D (OSCAL) 250-125 MG-UNIT per tablet Take 1 tablet by mouth       No current facility-administered medications for this visit  Allergies   Allergen Reactions    Cephalexin      Other reaction(s): thrush    Sulfa Antibiotics Other (See Comments)     Immunization History   Administered Date(s) Administered    INFLUENZA 2011, 2011, 2012, 10/22/2013, 10/16/2014, 2015, 2016, 2017    Influenza Split High Dose Preservative Free IM 2015, 2016, 2017    Influenza, high dose seasonal 0 5 mL 2018    Pneumococcal Conjugate 13-Valent 2015    Pneumococcal Polysaccharide PPV23 2008    Td (adult), Unspecified 2002    Tdap 2012    Zoster 2007       Patient Care Team:  Artie Roblero DO as PCP - General (Internal Medicine)    Medicare Screening Tests and Risk Assessments:  Phani Luther is here for her Subsequent Wellness visit  Health Risk Assessment:  Patient rates overall health as very good  Patient feels that their physical health rating is Slightly worse  Eyesight was rated as Same  Hearing was rated as Same  Patient feels that their emotional and mental health rating is Same  Pain experienced by patient in the last 7 days has been Some  Patient's pain rating has been 9/10  Emotional/Mental Health:  Patient has been feeling nervous/anxious  PHQ-9 Depression Screening:    Frequency of the following problems over the past two weeks:      1  Little interest or pleasure in doing things: 3 - nearly every day      2  Feeling down, depressed, or hopeless: 3 - nearly every day      3  Trouble falling or staying asleep, or sleeping too much: 0 - not at all      4  Feeling tired or having little energy: 1 - several days      5  Poor appetite or overeatin - not at all      6  Feeling bad about yourself - or that you are a failure or have let yourself or your family down: 3 - nearly every day      7   Trouble concentrating on things, such as reading the newspaper or watching television: 0 - not at all 8  Moving or speaking so slowly that other people could have noticed  Or the opposite - being so fidgety or restless that you have been moving around a lot more than usual: 0 - not at all      9  Thoughts that you would be better off dead, or of hurting yourself in some way: 0 - not at all  PHQ-2 Score: 6  PHQ-9 Score: 10    Broken Bones/Falls: Fall Risk Assessment:    In the past year, patient has experienced: History of falling in past year     Number of falls: 2 or more          Bladder/Bowel:  Patient has not leaked urine accidently in the last six months  Patient reports loss of bowel control  (Additional Comments: One time)    Immunizations:  Patient has had a flu vaccination within the last year  Patient has received a pneumonia shot  Patient has received a shingles shot  Patient has not received tetanus/diphtheria shot  Home Safety:  Patient does not have trouble with stairs inside or outside of their home  Patient currently reports that there are no safety hazards present in home, working smoke alarms, working carbon monoxide detectors  Preventative Screenings:   Breast cancer screening performed, colon cancer screen completed, cholesterol screen completed, glaucoma eye exam completed,     Nutrition:  Current diet: Regular with servings of the following:    Medications:  Patient is currently taking over-the-counter supplements  Patient is able to manage medications  Lifestyle Choices:  Patient reports no tobacco use  Patient has smoked or used tobacco in the past   Patient has stopped her tobacco use  Tobacco use quit date: 1965  Patient reports alcohol use  Alcohol use per week: 8-10  Patient does not drive a vehicle  Patient wears seat belt  Current level of exercise of physical activity described by patient as: moderate          Activities of Daily Living:  Can get out of bed by his or her self, able to dress self, able to make own meals, able to do own shopping, able to bathe self, can do own laundry/housekeeping, unable to manage own money and other related tasks    Previous Hospitalizations:  No hospitalization or ED visit in past 12 months        Advanced Directives:  Patient has decided on a power of   Patient has spoken to designated power of   Patient has completed advanced directive  Preventative Screening/Counseling:      Cardiovascular:      General: Risks and Benefits Discussed      Counseling: Healthy Diet          Diabetes:      General: Risks and Benefits Discussed and Screening Current          Colorectal Cancer:      General: Screening Not Indicated          Breast Cancer:      General: Screening Not Indicated          Cervical Cancer:      General: Screening Not Indicated          Osteoporosis:      Counseling: Calcium and Vitamin D Intake          AAA:      General: Screening Not Indicated          Glaucoma:      General: Screening Current          HIV:      General: Screening Not Indicated          Hepatitis C:      General: Screening Not Indicated        Advanced Directives:   Patient has living will for healthcare, has durable POA for healthcare, patient has an advanced directive  Information on ACP and/or AD provided  4 = No assist / stand by assistance

## 2024-06-03 NOTE — ASSESSMENT & PLAN NOTE
Medication: escitalopram passed protocol.   Last office visit date: 1/26/24  Next appointment scheduled?: Yes   Number of refills given: 3     Valium has helped  Psych is trying an increased dose of Zyprexa